# Patient Record
Sex: FEMALE | Race: WHITE | Employment: OTHER | URBAN - METROPOLITAN AREA
[De-identification: names, ages, dates, MRNs, and addresses within clinical notes are randomized per-mention and may not be internally consistent; named-entity substitution may affect disease eponyms.]

---

## 2024-09-22 ENCOUNTER — APPOINTMENT (OUTPATIENT)
Dept: CT IMAGING | Age: 64
End: 2024-09-22
Payer: COMMERCIAL

## 2024-09-22 ENCOUNTER — APPOINTMENT (OUTPATIENT)
Dept: ULTRASOUND IMAGING | Age: 64
End: 2024-09-22
Payer: COMMERCIAL

## 2024-09-22 ENCOUNTER — APPOINTMENT (OUTPATIENT)
Dept: GENERAL RADIOLOGY | Age: 64
End: 2024-09-22
Payer: COMMERCIAL

## 2024-09-22 ENCOUNTER — HOSPITAL ENCOUNTER (INPATIENT)
Age: 64
LOS: 5 days | Discharge: HOME | End: 2024-09-27
Attending: EMERGENCY MEDICINE | Admitting: FAMILY MEDICINE
Payer: COMMERCIAL

## 2024-09-22 DIAGNOSIS — I26.99 ACUTE PULMONARY EMBOLISM WITHOUT ACUTE COR PULMONALE, UNSPECIFIED PULMONARY EMBOLISM TYPE (HCC): ICD-10-CM

## 2024-09-22 DIAGNOSIS — I50.9 CHRONIC CONGESTIVE HEART FAILURE, UNSPECIFIED HEART FAILURE TYPE (HCC): ICD-10-CM

## 2024-09-22 DIAGNOSIS — R07.9 CHEST PAIN, UNSPECIFIED TYPE: ICD-10-CM

## 2024-09-22 DIAGNOSIS — I26.99 PULMONARY EMBOLISM WITHOUT ACUTE COR PULMONALE, UNSPECIFIED CHRONICITY, UNSPECIFIED PULMONARY EMBOLISM TYPE (HCC): Primary | ICD-10-CM

## 2024-09-22 DIAGNOSIS — I82.491 ACUTE DEEP VEIN THROMBOSIS (DVT) OF OTHER SPECIFIED VEIN OF RIGHT LOWER EXTREMITY (HCC): ICD-10-CM

## 2024-09-22 DIAGNOSIS — R06.02 SHORTNESS OF BREATH: ICD-10-CM

## 2024-09-22 PROBLEM — I10 ESSENTIAL (PRIMARY) HYPERTENSION: Status: ACTIVE | Noted: 2023-03-20

## 2024-09-22 PROBLEM — E78.49 OTHER HYPERLIPIDEMIA: Status: ACTIVE | Noted: 2024-09-22

## 2024-09-22 PROBLEM — J44.9 CHRONIC OBSTRUCTIVE PULMONARY DISEASE (HCC): Status: ACTIVE | Noted: 2021-09-07

## 2024-09-22 PROBLEM — I25.2 HISTORY OF MI (MYOCARDIAL INFARCTION): Status: ACTIVE | Noted: 2024-09-22

## 2024-09-22 PROBLEM — G89.29 CHRONIC PAIN: Status: ACTIVE | Noted: 2024-09-22

## 2024-09-22 PROBLEM — Z86.73 HISTORY OF CVA (CEREBROVASCULAR ACCIDENT): Status: ACTIVE | Noted: 2024-09-22

## 2024-09-22 PROBLEM — R57.9 SHOCK CIRCULATORY: Status: ACTIVE | Noted: 2024-09-22

## 2024-09-22 PROBLEM — I25.10 ATHEROSCLEROSIS OF NATIVE CORONARY ARTERY OF NATIVE HEART WITHOUT ANGINA PECTORIS: Status: ACTIVE | Noted: 2023-03-20

## 2024-09-22 PROBLEM — K31.84 GASTROPARESIS: Status: ACTIVE | Noted: 2024-09-22

## 2024-09-22 PROBLEM — M19.90 DJD (DEGENERATIVE JOINT DISEASE): Status: ACTIVE | Noted: 2024-09-22

## 2024-09-22 PROBLEM — Z87.39 HX OF DEGENERATIVE DISC DISEASE: Status: ACTIVE | Noted: 2024-09-22

## 2024-09-22 PROBLEM — F41.9 ANXIETY: Status: ACTIVE | Noted: 2024-09-22

## 2024-09-22 LAB
ALBUMIN SERPL-MCNC: 3.5 G/DL (ref 3.5–5.2)
ALP SERPL-CCNC: 57 U/L (ref 35–104)
ALT SERPL-CCNC: 6 U/L (ref 0–32)
ANION GAP SERPL CALCULATED.3IONS-SCNC: 9 MMOL/L (ref 7–16)
AST SERPL-CCNC: 11 U/L (ref 0–31)
BASOPHILS # BLD: 0.02 K/UL (ref 0–0.2)
BASOPHILS NFR BLD: 0 % (ref 0–2)
BILIRUB SERPL-MCNC: 0.5 MG/DL (ref 0–1.2)
BNP SERPL-MCNC: 154 PG/ML (ref 0–450)
BUN SERPL-MCNC: 9 MG/DL (ref 6–23)
CALCIUM SERPL-MCNC: 9 MG/DL (ref 8.6–10.2)
CHLORIDE SERPL-SCNC: 98 MMOL/L (ref 98–107)
CO2 SERPL-SCNC: 27 MMOL/L (ref 22–29)
CREAT SERPL-MCNC: 0.8 MG/DL (ref 0.5–1)
EOSINOPHIL # BLD: 0.17 K/UL (ref 0.05–0.5)
EOSINOPHILS RELATIVE PERCENT: 2 % (ref 0–6)
ERYTHROCYTE [DISTWIDTH] IN BLOOD BY AUTOMATED COUNT: 13.8 % (ref 11.5–15)
ERYTHROCYTE [DISTWIDTH] IN BLOOD BY AUTOMATED COUNT: 13.8 % (ref 11.5–15)
GFR, ESTIMATED: 78 ML/MIN/1.73M2
GLUCOSE SERPL-MCNC: 127 MG/DL (ref 74–99)
HCT VFR BLD AUTO: 31 % (ref 34–48)
HCT VFR BLD AUTO: 35.7 % (ref 34–48)
HGB BLD-MCNC: 10.4 G/DL (ref 11.5–15.5)
HGB BLD-MCNC: 12.1 G/DL (ref 11.5–15.5)
IMM GRANULOCYTES # BLD AUTO: 0.03 K/UL (ref 0–0.58)
IMM GRANULOCYTES NFR BLD: 0 % (ref 0–5)
LACTATE BLDV-SCNC: 2 MMOL/L (ref 0.5–2.2)
LIPASE SERPL-CCNC: 7 U/L (ref 13–60)
LYMPHOCYTES NFR BLD: 0.67 K/UL (ref 1.5–4)
LYMPHOCYTES RELATIVE PERCENT: 7 % (ref 20–42)
MAGNESIUM SERPL-MCNC: 1.9 MG/DL (ref 1.6–2.6)
MCH RBC QN AUTO: 28.4 PG (ref 26–35)
MCH RBC QN AUTO: 28.7 PG (ref 26–35)
MCHC RBC AUTO-ENTMCNC: 33.5 G/DL (ref 32–34.5)
MCHC RBC AUTO-ENTMCNC: 33.9 G/DL (ref 32–34.5)
MCV RBC AUTO: 84.7 FL (ref 80–99.9)
MCV RBC AUTO: 84.8 FL (ref 80–99.9)
MONOCYTES NFR BLD: 0.82 K/UL (ref 0.1–0.95)
MONOCYTES NFR BLD: 8 % (ref 2–12)
NEUTROPHILS NFR BLD: 83 % (ref 43–80)
NEUTS SEG NFR BLD: 8.48 K/UL (ref 1.8–7.3)
PARTIAL THROMBOPLASTIN TIME: 64.7 SEC (ref 24.5–35.1)
PLATELET # BLD AUTO: 129 K/UL (ref 130–450)
PLATELET # BLD AUTO: 140 K/UL (ref 130–450)
PMV BLD AUTO: 9.7 FL (ref 7–12)
PMV BLD AUTO: 9.7 FL (ref 7–12)
POTASSIUM SERPL-SCNC: 3.9 MMOL/L (ref 3.5–5)
PROT SERPL-MCNC: 6.7 G/DL (ref 6.4–8.3)
RBC # BLD AUTO: 3.66 M/UL (ref 3.5–5.5)
RBC # BLD AUTO: 4.21 M/UL (ref 3.5–5.5)
TROPONIN I SERPL HS-MCNC: 9 NG/L (ref 0–9)
WBC OTHER # BLD: 10.2 K/UL (ref 4.5–11.5)
WBC OTHER # BLD: 7.8 K/UL (ref 4.5–11.5)

## 2024-09-22 PROCEDURE — 93971 EXTREMITY STUDY: CPT

## 2024-09-22 PROCEDURE — 96366 THER/PROPH/DIAG IV INF ADDON: CPT

## 2024-09-22 PROCEDURE — 93005 ELECTROCARDIOGRAM TRACING: CPT | Performed by: EMERGENCY MEDICINE

## 2024-09-22 PROCEDURE — 83605 ASSAY OF LACTIC ACID: CPT

## 2024-09-22 PROCEDURE — 99285 EMERGENCY DEPT VISIT HI MDM: CPT

## 2024-09-22 PROCEDURE — 85027 COMPLETE CBC AUTOMATED: CPT

## 2024-09-22 PROCEDURE — 71275 CT ANGIOGRAPHY CHEST: CPT

## 2024-09-22 PROCEDURE — 84484 ASSAY OF TROPONIN QUANT: CPT

## 2024-09-22 PROCEDURE — 83880 ASSAY OF NATRIURETIC PEPTIDE: CPT

## 2024-09-22 PROCEDURE — 99223 1ST HOSP IP/OBS HIGH 75: CPT | Performed by: FAMILY MEDICINE

## 2024-09-22 PROCEDURE — 83735 ASSAY OF MAGNESIUM: CPT

## 2024-09-22 PROCEDURE — 85025 COMPLETE CBC W/AUTO DIFF WBC: CPT

## 2024-09-22 PROCEDURE — 6360000004 HC RX CONTRAST MEDICATION: Performed by: RADIOLOGY

## 2024-09-22 PROCEDURE — 96374 THER/PROPH/DIAG INJ IV PUSH: CPT

## 2024-09-22 PROCEDURE — 96361 HYDRATE IV INFUSION ADD-ON: CPT

## 2024-09-22 PROCEDURE — 83690 ASSAY OF LIPASE: CPT

## 2024-09-22 PROCEDURE — 80053 COMPREHEN METABOLIC PANEL: CPT

## 2024-09-22 PROCEDURE — 6360000002 HC RX W HCPCS: Performed by: EMERGENCY MEDICINE

## 2024-09-22 PROCEDURE — 85730 THROMBOPLASTIN TIME PARTIAL: CPT

## 2024-09-22 PROCEDURE — 96375 TX/PRO/DX INJ NEW DRUG ADDON: CPT

## 2024-09-22 PROCEDURE — 2580000003 HC RX 258: Performed by: EMERGENCY MEDICINE

## 2024-09-22 PROCEDURE — 82550 ASSAY OF CK (CPK): CPT

## 2024-09-22 PROCEDURE — 2580000003 HC RX 258

## 2024-09-22 PROCEDURE — 6370000000 HC RX 637 (ALT 250 FOR IP): Performed by: EMERGENCY MEDICINE

## 2024-09-22 PROCEDURE — 2000000000 HC ICU R&B

## 2024-09-22 PROCEDURE — 96365 THER/PROPH/DIAG IV INF INIT: CPT

## 2024-09-22 PROCEDURE — 71045 X-RAY EXAM CHEST 1 VIEW: CPT

## 2024-09-22 RX ORDER — ATORVASTATIN CALCIUM 40 MG/1
40 TABLET, FILM COATED ORAL NIGHTLY
Status: DISCONTINUED | OUTPATIENT
Start: 2024-09-22 | End: 2024-09-27 | Stop reason: HOSPADM

## 2024-09-22 RX ORDER — ERGOCALCIFEROL 1.25 MG/1
1 CAPSULE ORAL WEEKLY
COMMUNITY
Start: 2024-09-18

## 2024-09-22 RX ORDER — FENTANYL CITRATE 50 UG/ML
50 INJECTION, SOLUTION INTRAMUSCULAR; INTRAVENOUS ONCE
Status: COMPLETED | OUTPATIENT
Start: 2024-09-22 | End: 2024-09-23

## 2024-09-22 RX ORDER — FOLIC ACID 1 MG/1
1 TABLET ORAL DAILY
COMMUNITY

## 2024-09-22 RX ORDER — BUDESONIDE 0.5 MG/2ML
0.5 INHALANT ORAL
Status: DISCONTINUED | OUTPATIENT
Start: 2024-09-22 | End: 2024-09-23 | Stop reason: SDUPTHER

## 2024-09-22 RX ORDER — HYDROCODONE BITARTRATE AND ACETAMINOPHEN 5; 325 MG/1; MG/1
1 TABLET ORAL ONCE
Status: COMPLETED | OUTPATIENT
Start: 2024-09-22 | End: 2024-09-22

## 2024-09-22 RX ORDER — NICOTINE 21 MG/24HR
1 PATCH, TRANSDERMAL 24 HOURS TRANSDERMAL DAILY
Status: DISCONTINUED | OUTPATIENT
Start: 2024-09-22 | End: 2024-09-27 | Stop reason: HOSPADM

## 2024-09-22 RX ORDER — GLUCAGON 1 MG/ML
1 KIT INJECTION PRN
Status: DISCONTINUED | OUTPATIENT
Start: 2024-09-22 | End: 2024-09-27 | Stop reason: HOSPADM

## 2024-09-22 RX ORDER — SODIUM CHLORIDE 0.9 % (FLUSH) 0.9 %
5-40 SYRINGE (ML) INJECTION PRN
Status: DISCONTINUED | OUTPATIENT
Start: 2024-09-22 | End: 2024-09-27 | Stop reason: HOSPADM

## 2024-09-22 RX ORDER — ARFORMOTEROL TARTRATE 15 UG/2ML
15 SOLUTION RESPIRATORY (INHALATION)
Status: DISCONTINUED | OUTPATIENT
Start: 2024-09-22 | End: 2024-09-27 | Stop reason: HOSPADM

## 2024-09-22 RX ORDER — FOLIC ACID 1 MG/1
1 TABLET ORAL DAILY
Status: DISCONTINUED | OUTPATIENT
Start: 2024-09-23 | End: 2024-09-27 | Stop reason: HOSPADM

## 2024-09-22 RX ORDER — ERGOCALCIFEROL 1.25 MG/1
50000 CAPSULE, LIQUID FILLED ORAL
Status: DISCONTINUED | OUTPATIENT
Start: 2024-09-23 | End: 2024-09-27 | Stop reason: HOSPADM

## 2024-09-22 RX ORDER — 0.9 % SODIUM CHLORIDE 0.9 %
500 INTRAVENOUS SOLUTION INTRAVENOUS ONCE
Status: COMPLETED | OUTPATIENT
Start: 2024-09-22 | End: 2024-09-22

## 2024-09-22 RX ORDER — ACETAMINOPHEN 325 MG/1
650 TABLET ORAL EVERY 6 HOURS PRN
Status: DISCONTINUED | OUTPATIENT
Start: 2024-09-22 | End: 2024-09-27 | Stop reason: HOSPADM

## 2024-09-22 RX ORDER — SODIUM CHLORIDE 9 MG/ML
INJECTION, SOLUTION INTRAVENOUS CONTINUOUS
Status: DISCONTINUED | OUTPATIENT
Start: 2024-09-22 | End: 2024-09-23

## 2024-09-22 RX ORDER — TRAZODONE HYDROCHLORIDE 50 MG/1
50 TABLET, FILM COATED ORAL NIGHTLY
COMMUNITY
Start: 2022-10-10

## 2024-09-22 RX ORDER — METOPROLOL SUCCINATE 25 MG/1
25 TABLET, EXTENDED RELEASE ORAL DAILY
Status: ON HOLD | COMMUNITY
Start: 2024-08-19 | End: 2024-09-27 | Stop reason: HOSPADM

## 2024-09-22 RX ORDER — HEPARIN SODIUM 1000 [USP'U]/ML
80 INJECTION, SOLUTION INTRAVENOUS; SUBCUTANEOUS ONCE
Status: COMPLETED | OUTPATIENT
Start: 2024-09-22 | End: 2024-09-22

## 2024-09-22 RX ORDER — ATORVASTATIN CALCIUM 40 MG/1
1 TABLET, FILM COATED ORAL NIGHTLY
COMMUNITY
Start: 2024-03-18

## 2024-09-22 RX ORDER — MORPHINE SULFATE 15 MG/1
15 TABLET, FILM COATED, EXTENDED RELEASE ORAL EVERY 12 HOURS
Status: DISCONTINUED | OUTPATIENT
Start: 2024-09-22 | End: 2024-09-27 | Stop reason: HOSPADM

## 2024-09-22 RX ORDER — 0.9 % SODIUM CHLORIDE 0.9 %
1000 INTRAVENOUS SOLUTION INTRAVENOUS ONCE
Status: COMPLETED | OUTPATIENT
Start: 2024-09-22 | End: 2024-09-22

## 2024-09-22 RX ORDER — IPRATROPIUM BROMIDE AND ALBUTEROL SULFATE 2.5; .5 MG/3ML; MG/3ML
1 SOLUTION RESPIRATORY (INHALATION) EVERY 4 HOURS PRN
Status: DISCONTINUED | OUTPATIENT
Start: 2024-09-22 | End: 2024-09-27 | Stop reason: HOSPADM

## 2024-09-22 RX ORDER — HEPARIN SODIUM 1000 [USP'U]/ML
40 INJECTION, SOLUTION INTRAVENOUS; SUBCUTANEOUS PRN
Status: DISCONTINUED | OUTPATIENT
Start: 2024-09-22 | End: 2024-09-24

## 2024-09-22 RX ORDER — CLOPIDOGREL BISULFATE 75 MG/1
75 TABLET ORAL DAILY
COMMUNITY
Start: 2024-05-21

## 2024-09-22 RX ORDER — LISINOPRIL 40 MG/1
40 TABLET ORAL DAILY
Status: ON HOLD | COMMUNITY
Start: 2024-08-19 | End: 2024-09-27 | Stop reason: HOSPADM

## 2024-09-22 RX ORDER — HEPARIN SODIUM 1000 [USP'U]/ML
80 INJECTION, SOLUTION INTRAVENOUS; SUBCUTANEOUS PRN
Status: DISCONTINUED | OUTPATIENT
Start: 2024-09-22 | End: 2024-09-24

## 2024-09-22 RX ORDER — SODIUM CHLORIDE 9 MG/ML
INJECTION, SOLUTION INTRAVENOUS PRN
Status: DISCONTINUED | OUTPATIENT
Start: 2024-09-22 | End: 2024-09-27 | Stop reason: HOSPADM

## 2024-09-22 RX ORDER — IOPAMIDOL 755 MG/ML
70 INJECTION, SOLUTION INTRAVASCULAR
Status: COMPLETED | OUTPATIENT
Start: 2024-09-22 | End: 2024-09-22

## 2024-09-22 RX ORDER — FENTANYL CITRATE 50 UG/ML
50 INJECTION, SOLUTION INTRAMUSCULAR; INTRAVENOUS ONCE
Status: COMPLETED | OUTPATIENT
Start: 2024-09-22 | End: 2024-09-22

## 2024-09-22 RX ORDER — SENNA AND DOCUSATE SODIUM 50; 8.6 MG/1; MG/1
2 TABLET, FILM COATED ORAL DAILY PRN
Status: DISCONTINUED | OUTPATIENT
Start: 2024-09-22 | End: 2024-09-27 | Stop reason: HOSPADM

## 2024-09-22 RX ORDER — QUETIAPINE FUMARATE 300 MG/1
300 TABLET, FILM COATED ORAL DAILY
COMMUNITY
Start: 2024-07-11

## 2024-09-22 RX ORDER — ONDANSETRON 2 MG/ML
4 INJECTION INTRAMUSCULAR; INTRAVENOUS ONCE
Status: COMPLETED | OUTPATIENT
Start: 2024-09-22 | End: 2024-09-22

## 2024-09-22 RX ORDER — ISOSORBIDE MONONITRATE 60 MG/1
1 TABLET, EXTENDED RELEASE ORAL EVERY MORNING
Status: ON HOLD | COMMUNITY
Start: 2024-08-19 | End: 2024-09-27 | Stop reason: HOSPADM

## 2024-09-22 RX ORDER — TOPIRAMATE 50 MG/1
50 TABLET, FILM COATED ORAL 2 TIMES DAILY
COMMUNITY

## 2024-09-22 RX ORDER — ACETAMINOPHEN 650 MG/1
650 SUPPOSITORY RECTAL EVERY 6 HOURS PRN
Status: DISCONTINUED | OUTPATIENT
Start: 2024-09-22 | End: 2024-09-27 | Stop reason: HOSPADM

## 2024-09-22 RX ORDER — AMLODIPINE BESYLATE 5 MG/1
5 TABLET ORAL DAILY
Status: ON HOLD | COMMUNITY
Start: 2024-08-19 | End: 2024-09-27 | Stop reason: HOSPADM

## 2024-09-22 RX ORDER — SODIUM CHLORIDE 9 MG/ML
INJECTION, SOLUTION INTRAVENOUS
Status: COMPLETED
Start: 2024-09-22 | End: 2024-09-22

## 2024-09-22 RX ORDER — ASPIRIN 81 MG/1
81 TABLET ORAL DAILY
COMMUNITY
Start: 2024-05-21

## 2024-09-22 RX ORDER — FENTANYL CITRATE 50 UG/ML
50 INJECTION, SOLUTION INTRAMUSCULAR; INTRAVENOUS
Status: DISCONTINUED | OUTPATIENT
Start: 2024-09-23 | End: 2024-09-23 | Stop reason: CLARIF

## 2024-09-22 RX ORDER — DEXTROSE MONOHYDRATE 100 MG/ML
INJECTION, SOLUTION INTRAVENOUS CONTINUOUS PRN
Status: DISCONTINUED | OUTPATIENT
Start: 2024-09-22 | End: 2024-09-27 | Stop reason: HOSPADM

## 2024-09-22 RX ORDER — ALPRAZOLAM 0.5 MG
0.5 TABLET ORAL 2 TIMES DAILY PRN
COMMUNITY
Start: 2024-09-18

## 2024-09-22 RX ORDER — SODIUM CHLORIDE 0.9 % (FLUSH) 0.9 %
5-40 SYRINGE (ML) INJECTION EVERY 12 HOURS SCHEDULED
Status: DISCONTINUED | OUTPATIENT
Start: 2024-09-22 | End: 2024-09-27 | Stop reason: HOSPADM

## 2024-09-22 RX ORDER — VENLAFAXINE HYDROCHLORIDE 150 MG/1
150 CAPSULE, EXTENDED RELEASE ORAL DAILY
COMMUNITY

## 2024-09-22 RX ORDER — ONDANSETRON 4 MG/1
4 TABLET, ORALLY DISINTEGRATING ORAL EVERY 8 HOURS PRN
Status: ON HOLD | COMMUNITY
Start: 2024-07-19 | End: 2024-09-27 | Stop reason: HOSPADM

## 2024-09-22 RX ORDER — VENLAFAXINE HYDROCHLORIDE 150 MG/1
150 CAPSULE, EXTENDED RELEASE ORAL DAILY
Status: DISCONTINUED | OUTPATIENT
Start: 2024-09-23 | End: 2024-09-27 | Stop reason: HOSPADM

## 2024-09-22 RX ORDER — MORPHINE SULFATE 15 MG/1
15 TABLET, FILM COATED, EXTENDED RELEASE ORAL EVERY 12 HOURS
COMMUNITY
Start: 2024-09-16

## 2024-09-22 RX ORDER — QUETIAPINE FUMARATE 100 MG/1
300 TABLET, FILM COATED ORAL DAILY
Status: DISCONTINUED | OUTPATIENT
Start: 2024-09-23 | End: 2024-09-24 | Stop reason: SDUPTHER

## 2024-09-22 RX ORDER — TRAZODONE HYDROCHLORIDE 50 MG/1
50 TABLET, FILM COATED ORAL NIGHTLY
Status: DISCONTINUED | OUTPATIENT
Start: 2024-09-22 | End: 2024-09-27 | Stop reason: HOSPADM

## 2024-09-22 RX ORDER — HEPARIN SODIUM 10000 [USP'U]/100ML
5-30 INJECTION, SOLUTION INTRAVENOUS CONTINUOUS
Status: DISCONTINUED | OUTPATIENT
Start: 2024-09-22 | End: 2024-09-24

## 2024-09-22 RX ORDER — ENOXAPARIN SODIUM 100 MG/ML
1 INJECTION SUBCUTANEOUS ONCE
Status: DISCONTINUED | OUTPATIENT
Start: 2024-09-22 | End: 2024-09-22

## 2024-09-22 RX ORDER — NICOTINE 21 MG/24HR
1 PATCH, TRANSDERMAL 24 HOURS TRANSDERMAL DAILY
COMMUNITY
Start: 2024-05-16

## 2024-09-22 RX ORDER — TOPIRAMATE 25 MG/1
50 TABLET, FILM COATED ORAL 2 TIMES DAILY
Status: DISCONTINUED | OUTPATIENT
Start: 2024-09-22 | End: 2024-09-27 | Stop reason: HOSPADM

## 2024-09-22 RX ORDER — ALPRAZOLAM 0.5 MG
0.5 TABLET ORAL 2 TIMES DAILY PRN
Status: DISCONTINUED | OUTPATIENT
Start: 2024-09-22 | End: 2024-09-27 | Stop reason: HOSPADM

## 2024-09-22 RX ADMIN — HEPARIN SODIUM 5800 UNITS: 1000 INJECTION INTRAVENOUS; SUBCUTANEOUS at 19:26

## 2024-09-22 RX ADMIN — SODIUM CHLORIDE 1000 ML: 9 INJECTION, SOLUTION INTRAVENOUS at 19:28

## 2024-09-22 RX ADMIN — ONDANSETRON 4 MG: 2 INJECTION INTRAMUSCULAR; INTRAVENOUS at 14:19

## 2024-09-22 RX ADMIN — Medication 500 ML: at 18:22

## 2024-09-22 RX ADMIN — SODIUM CHLORIDE 1000 ML: 9 INJECTION, SOLUTION INTRAVENOUS at 14:19

## 2024-09-22 RX ADMIN — IOPAMIDOL 70 ML: 755 INJECTION, SOLUTION INTRAVENOUS at 18:12

## 2024-09-22 RX ADMIN — FENTANYL CITRATE 50 MCG: 50 INJECTION, SOLUTION INTRAMUSCULAR; INTRAVENOUS at 19:28

## 2024-09-22 RX ADMIN — HYDROCODONE BITARTRATE AND ACETAMINOPHEN 1 TABLET: 5; 325 TABLET ORAL at 14:19

## 2024-09-22 RX ADMIN — SODIUM CHLORIDE 500 ML: 9 INJECTION, SOLUTION INTRAVENOUS at 18:22

## 2024-09-22 RX ADMIN — HEPARIN SODIUM AND DEXTROSE 18 UNITS/KG/HR: 10000; 5 INJECTION INTRAVENOUS at 19:28

## 2024-09-22 ASSESSMENT — PAIN SCALES - GENERAL: PAINLEVEL_OUTOF10: 10

## 2024-09-22 NOTE — ED PROVIDER NOTES
[facility] EMERGENCY DEPARTMENT  EMERGENCY DEPARTMENT ENCOUNTER        Pt Name: Niru Chaves  MRN: [id number]  Birthdate 1960  Date of evaluation: 9/22/2024  Provider: Aracelis Zacarias DO  PCP: Crystal Paulino MD  Note Started: 2:00 PM EDT 9/22/24    CHIEF COMPLAINT       Chief Complaint   Patient presents with    Leg Swelling     Right leg pain and swelling        HISTORY OF PRESENT ILLNESS: 1 or more Elements   History From: Patient    Limitations to history : None    Niru Chaves is a 64 y.o. female who presents with concern for pain and swelling in her right lower extremity.  She does have some worsening over the past 2 days, nothing is made better, nothing is made it worse, no traumatic events.  She denies recent travel or surgery, she is on Plavix and no anticoagulation, she denies chest pain or difficulty breathing, no headaches or vision changes, no numbness, weakness, tingling.  No other associated complaints.  She notes that she does have 5 stents for her cardiac history, has never had a blood clot in the past, no other associations.      EXTERNAL NOTE REVIEW:      On chart review was last seen by PCP on 3/20/2023 for follow-up on chronic medical conditions.    REVIEW OF SYSTEMS :      Positives and Pertinent negatives as per HPI.     SURGICAL HISTORY   No past surgical history on file.    CURRENTMEDICATIONS       Previous Medications    ALPRAZOLAM (XANAX) 0.5 MG TABLET    Take 1 tablet by mouth 2 times daily as needed for Anxiety. Max Daily Amount: 1 mg    AMLODIPINE (NORVASC) 5 MG TABLET    Take 1 tablet by mouth daily    ASPIRIN 81 MG EC TABLET    Take 1 tablet by mouth daily    ATORVASTATIN (LIPITOR) 40 MG TABLET    Take 1 tablet by mouth nightly    CLOPIDOGREL (PLAVIX) 75 MG TABLET    Take 1 tablet by mouth daily    FOLIC ACID (FOLVITE) 1 MG TABLET    Take 1 tablet by mouth daily    ISOSORBIDE MONONITRATE (IMDUR) 60 MG EXTENDED RELEASE TABLET    Take 1 tablet  addition to providing specific details for the plan of care and counseling regarding the diagnosis and prognosis.  Questions are answered at this time and they are agreeable with the plan.      I am the Primary Clinician of Record.    FINAL IMPRESSION      1. Pulmonary embolism without acute cor pulmonale, unspecified chronicity, unspecified pulmonary embolism type (HCC)    2. Acute deep vein thrombosis (DVT) of other specified vein of right lower extremity (HCC)    3. Chest pain, unspecified type    4. Chronic congestive heart failure, unspecified heart failure type (HCC)    5. Shortness of breath          DISPOSITION/PLAN     DISPOSITION Admitted 09/22/2024 09:07:08 PM      (Please note that portions of this note were completed with a voice recognition program.  Efforts were made to edit the dictations but occasionally words are mis-transcribed.)    Aracelis Zacarias DO (electronically signed)            Aracelis Zacarias DO  09/23/24 0033

## 2024-09-23 ENCOUNTER — APPOINTMENT (OUTPATIENT)
Age: 64
End: 2024-09-23
Attending: FAMILY MEDICINE
Payer: COMMERCIAL

## 2024-09-23 PROBLEM — R07.9 CHEST PAIN: Status: ACTIVE | Noted: 2024-09-23

## 2024-09-23 PROBLEM — E78.5 HYPERLIPIDEMIA: Status: ACTIVE | Noted: 2024-09-22

## 2024-09-23 PROBLEM — I26.99 PULMONARY EMBOLISM WITHOUT ACUTE COR PULMONALE (HCC): Status: ACTIVE | Noted: 2024-09-23

## 2024-09-23 PROBLEM — R06.02 SHORTNESS OF BREATH: Status: ACTIVE | Noted: 2024-09-23

## 2024-09-23 PROBLEM — I82.409 DEEP VENOUS THROMBOSIS (HCC): Status: ACTIVE | Noted: 2024-09-23

## 2024-09-23 LAB
ANION GAP SERPL CALCULATED.3IONS-SCNC: 9 MMOL/L (ref 7–16)
BACTERIA URNS QL MICRO: ABNORMAL
BASOPHILS # BLD: 0.01 K/UL (ref 0–0.2)
BASOPHILS NFR BLD: 0 % (ref 0–2)
BILIRUB UR QL STRIP: NEGATIVE
BNP SERPL-MCNC: 267 PG/ML (ref 0–450)
BUN SERPL-MCNC: 7 MG/DL (ref 6–23)
CALCIUM SERPL-MCNC: 8 MG/DL (ref 8.6–10.2)
CHLORIDE SERPL-SCNC: 107 MMOL/L (ref 98–107)
CHOLEST SERPL-MCNC: 131 MG/DL
CLARITY UR: CLEAR
CO2 SERPL-SCNC: 23 MMOL/L (ref 22–29)
COLOR UR: YELLOW
CREAT SERPL-MCNC: 0.7 MG/DL (ref 0.5–1)
ECHO AV AREA PEAK VELOCITY: 3.1 CM2
ECHO AV AREA PEAK VELOCITY: 3.2 CM2
ECHO AV AREA PEAK VELOCITY: 3.6 CM2
ECHO AV AREA PEAK VELOCITY: 3.7 CM2
ECHO AV AREA VTI: 3.6 CM2
ECHO AV AREA/BSA VTI: 1.9 CM2/M2
ECHO AV CUSP MM: 2.2 CM
ECHO AV MEAN GRADIENT: 4 MMHG
ECHO AV MEAN VELOCITY: 1 M/S
ECHO AV PEAK GRADIENT: 7 MMHG
ECHO AV PEAK GRADIENT: 7 MMHG
ECHO AV PEAK VELOCITY: 1.2 M/S
ECHO AV PEAK VELOCITY: 1.4 M/S
ECHO AV VTI: 26.8 CM
ECHO BSA: 1.92 M2
ECHO EST RA PRESSURE: 3 MMHG
ECHO LA DIAMETER INDEX: 2.16 CM/M2
ECHO LA DIAMETER: 4.1 CM
ECHO LA VOL A-L A2C: 54 ML (ref 22–52)
ECHO LA VOL A-L A4C: 53 ML (ref 22–52)
ECHO LA VOL BP: 55 ML (ref 22–52)
ECHO LA VOL MOD A2C: 53 ML (ref 22–52)
ECHO LA VOL MOD A4C: 50 ML (ref 22–52)
ECHO LA VOL/BSA BIPLANE: 29 ML/M2 (ref 16–34)
ECHO LA VOLUME AREA LENGTH: 57 ML
ECHO LA VOLUME INDEX A-L A2C: 28 ML/M2 (ref 16–34)
ECHO LA VOLUME INDEX A-L A4C: 28 ML/M2 (ref 16–34)
ECHO LA VOLUME INDEX AREA LENGTH: 30 ML/M2 (ref 16–34)
ECHO LA VOLUME INDEX MOD A2C: 28 ML/M2 (ref 16–34)
ECHO LA VOLUME INDEX MOD A4C: 26 ML/M2 (ref 16–34)
ECHO LV EF PHYSICIAN: 68 %
ECHO LV FRACTIONAL SHORTENING: 38 % (ref 28–44)
ECHO LV INTERNAL DIMENSION DIASTOLE INDEX: 2.47 CM/M2
ECHO LV INTERNAL DIMENSION DIASTOLIC: 4.7 CM (ref 3.9–5.3)
ECHO LV INTERNAL DIMENSION SYSTOLIC INDEX: 1.53 CM/M2
ECHO LV INTERNAL DIMENSION SYSTOLIC: 2.9 CM
ECHO LV IVSD: 1 CM (ref 0.6–0.9)
ECHO LV IVSS: 1.6 CM
ECHO LV MASS 2D: 175.8 G (ref 67–162)
ECHO LV MASS INDEX 2D: 92.5 G/M2 (ref 43–95)
ECHO LV POSTERIOR WALL DIASTOLIC: 1.1 CM (ref 0.6–0.9)
ECHO LV POSTERIOR WALL SYSTOLIC: 1.6 CM
ECHO LV RELATIVE WALL THICKNESS RATIO: 0.47
ECHO LVOT AREA: 4.2 CM2
ECHO LVOT AV VTI INDEX: 0.84
ECHO LVOT DIAM: 2.3 CM
ECHO LVOT MEAN GRADIENT: 2 MMHG
ECHO LVOT PEAK GRADIENT: 4 MMHG
ECHO LVOT PEAK GRADIENT: 4 MMHG
ECHO LVOT PEAK VELOCITY: 1 M/S
ECHO LVOT PEAK VELOCITY: 1 M/S
ECHO LVOT STROKE VOLUME INDEX: 49.4 ML/M2
ECHO LVOT SV: 93.8 ML
ECHO LVOT VTI: 22.6 CM
ECHO MV "A" WAVE DURATION: 137 MSEC
ECHO MV A VELOCITY: 0.88 M/S
ECHO MV AREA PHT: 3.1 CM2
ECHO MV AREA VTI: 3.6 CM2
ECHO MV E DECELERATION TIME (DT): 287.5 MS
ECHO MV E VELOCITY: 0.68 M/S
ECHO MV E/A RATIO: 0.77
ECHO MV LVOT VTI INDEX: 1.15
ECHO MV MAX VELOCITY: 1.1 M/S
ECHO MV MEAN GRADIENT: 2 MMHG
ECHO MV MEAN VELOCITY: 0.6 M/S
ECHO MV PEAK GRADIENT: 4 MMHG
ECHO MV PRESSURE HALF TIME (PHT): 70.4 MS
ECHO MV VTI: 26.1 CM
ECHO PV MAX VELOCITY: 1 M/S
ECHO PV MEAN GRADIENT: 2 MMHG
ECHO PV MEAN VELOCITY: 0.8 M/S
ECHO PV PEAK GRADIENT: 4 MMHG
ECHO PV VTI: 20 CM
ECHO PVEIN A DURATION: 144.6 MS
ECHO PVEIN A VELOCITY: 0.4 M/S
ECHO PVEIN PEAK D VELOCITY: 0.4 M/S
ECHO PVEIN PEAK S VELOCITY: 0.6 M/S
ECHO PVEIN S/D RATIO: 1.5
ECHO RIGHT VENTRICULAR SYSTOLIC PRESSURE (RVSP): 11 MMHG
ECHO RV INTERNAL DIMENSION: 3.2 CM
ECHO RV LONGITUDINAL DIMENSION: 7.8 CM
ECHO RV MID DIMENSION: 3.7 CM
ECHO RVOT MEAN GRADIENT: 1 MMHG
ECHO RVOT PEAK GRADIENT: 2 MMHG
ECHO RVOT PEAK VELOCITY: 0.7 M/S
ECHO RVOT VTI: 12.2 CM
ECHO TV REGURGITANT MAX VELOCITY: 1.4 M/S
ECHO TV REGURGITANT PEAK GRADIENT: 8 MMHG
EKG ATRIAL RATE: 94 BPM
EKG P AXIS: 46 DEGREES
EKG P-R INTERVAL: 140 MS
EKG Q-T INTERVAL: 348 MS
EKG QRS DURATION: 84 MS
EKG QTC CALCULATION (BAZETT): 435 MS
EKG R AXIS: -7 DEGREES
EKG T AXIS: 50 DEGREES
EKG VENTRICULAR RATE: 94 BPM
EOSINOPHIL # BLD: 0.14 K/UL (ref 0.05–0.5)
EOSINOPHILS RELATIVE PERCENT: 2 % (ref 0–6)
EPI CELLS #/AREA URNS HPF: ABNORMAL /HPF
ERYTHROCYTE [DISTWIDTH] IN BLOOD BY AUTOMATED COUNT: 13.8 % (ref 11.5–15)
GFR, ESTIMATED: >90 ML/MIN/1.73M2
GLUCOSE SERPL-MCNC: 111 MG/DL (ref 74–99)
GLUCOSE UR STRIP-MCNC: NEGATIVE MG/DL
HCT VFR BLD AUTO: 33.4 % (ref 34–48)
HDLC SERPL-MCNC: 30 MG/DL
HGB BLD-MCNC: 11.1 G/DL (ref 11.5–15.5)
HGB UR QL STRIP.AUTO: NEGATIVE
IMM GRANULOCYTES # BLD AUTO: 0.04 K/UL (ref 0–0.58)
IMM GRANULOCYTES NFR BLD: 1 % (ref 0–5)
INR PPP: 1.4
KETONES UR STRIP-MCNC: NEGATIVE MG/DL
LACTATE BLDV-SCNC: 1.3 MMOL/L (ref 0.5–2.2)
LDLC SERPL CALC-MCNC: 79 MG/DL
LEUKOCYTE ESTERASE UR QL STRIP: ABNORMAL
LYMPHOCYTES NFR BLD: 0.83 K/UL (ref 1.5–4)
LYMPHOCYTES RELATIVE PERCENT: 10 % (ref 20–42)
MCH RBC QN AUTO: 28.6 PG (ref 26–35)
MCHC RBC AUTO-ENTMCNC: 33.2 G/DL (ref 32–34.5)
MCV RBC AUTO: 86.1 FL (ref 80–99.9)
MONOCYTES NFR BLD: 0.71 K/UL (ref 0.1–0.95)
MONOCYTES NFR BLD: 8 % (ref 2–12)
NEUTROPHILS NFR BLD: 80 % (ref 43–80)
NEUTS SEG NFR BLD: 6.73 K/UL (ref 1.8–7.3)
NITRITE UR QL STRIP: NEGATIVE
PARTIAL THROMBOPLASTIN TIME: 146.7 SEC (ref 24.5–35.1)
PARTIAL THROMBOPLASTIN TIME: 151.8 SEC (ref 24.5–35.1)
PARTIAL THROMBOPLASTIN TIME: 74.4 SEC (ref 24.5–35.1)
PARTIAL THROMBOPLASTIN TIME: 80.4 SEC (ref 24.5–35.1)
PH UR STRIP: 7.5 [PH] (ref 5–9)
PLATELET # BLD AUTO: 164 K/UL (ref 130–450)
PMV BLD AUTO: 9.5 FL (ref 7–12)
POTASSIUM SERPL-SCNC: 4 MMOL/L (ref 3.5–5)
PROT UR STRIP-MCNC: NEGATIVE MG/DL
PROTHROMBIN TIME: 15.1 SEC (ref 9.3–12.4)
RBC # BLD AUTO: 3.88 M/UL (ref 3.5–5.5)
RBC #/AREA URNS HPF: ABNORMAL /HPF
SODIUM SERPL-SCNC: 139 MMOL/L (ref 132–146)
SP GR UR STRIP: 1.01 (ref 1–1.03)
TRIGL SERPL-MCNC: 112 MG/DL
TROPONIN I SERPL HS-MCNC: 10 NG/L (ref 0–9)
TROPONIN I SERPL HS-MCNC: 9 NG/L (ref 0–9)
UROBILINOGEN UR STRIP-ACNC: 1 EU/DL (ref 0–1)
VLDLC SERPL CALC-MCNC: 22 MG/DL
WBC #/AREA URNS HPF: ABNORMAL /HPF
WBC OTHER # BLD: 8.5 K/UL (ref 4.5–11.5)

## 2024-09-23 PROCEDURE — 86147 CARDIOLIPIN ANTIBODY EA IG: CPT

## 2024-09-23 PROCEDURE — 6360000002 HC RX W HCPCS: Performed by: INTERNAL MEDICINE

## 2024-09-23 PROCEDURE — 6370000000 HC RX 637 (ALT 250 FOR IP): Performed by: INTERNAL MEDICINE

## 2024-09-23 PROCEDURE — 83880 ASSAY OF NATRIURETIC PEPTIDE: CPT

## 2024-09-23 PROCEDURE — 2580000003 HC RX 258: Performed by: INTERNAL MEDICINE

## 2024-09-23 PROCEDURE — 6370000000 HC RX 637 (ALT 250 FOR IP): Performed by: FAMILY MEDICINE

## 2024-09-23 PROCEDURE — 93010 ELECTROCARDIOGRAM REPORT: CPT | Performed by: INTERNAL MEDICINE

## 2024-09-23 PROCEDURE — 85610 PROTHROMBIN TIME: CPT

## 2024-09-23 PROCEDURE — 81001 URINALYSIS AUTO W/SCOPE: CPT

## 2024-09-23 PROCEDURE — 6360000002 HC RX W HCPCS: Performed by: FAMILY MEDICINE

## 2024-09-23 PROCEDURE — 6360000002 HC RX W HCPCS

## 2024-09-23 PROCEDURE — 99291 CRITICAL CARE FIRST HOUR: CPT | Performed by: INTERNAL MEDICINE

## 2024-09-23 PROCEDURE — 94640 AIRWAY INHALATION TREATMENT: CPT

## 2024-09-23 PROCEDURE — 2580000003 HC RX 258: Performed by: FAMILY MEDICINE

## 2024-09-23 PROCEDURE — 80061 LIPID PANEL: CPT

## 2024-09-23 PROCEDURE — 87040 BLOOD CULTURE FOR BACTERIA: CPT

## 2024-09-23 PROCEDURE — 80048 BASIC METABOLIC PNL TOTAL CA: CPT

## 2024-09-23 PROCEDURE — 87081 CULTURE SCREEN ONLY: CPT

## 2024-09-23 PROCEDURE — 83605 ASSAY OF LACTIC ACID: CPT

## 2024-09-23 PROCEDURE — 99232 SBSQ HOSP IP/OBS MODERATE 35: CPT | Performed by: FAMILY MEDICINE

## 2024-09-23 PROCEDURE — 85025 COMPLETE CBC W/AUTO DIFF WBC: CPT

## 2024-09-23 PROCEDURE — 84484 ASSAY OF TROPONIN QUANT: CPT

## 2024-09-23 PROCEDURE — 93306 TTE W/DOPPLER COMPLETE: CPT | Performed by: INTERNAL MEDICINE

## 2024-09-23 PROCEDURE — 85730 THROMBOPLASTIN TIME PARTIAL: CPT

## 2024-09-23 PROCEDURE — 93306 TTE W/DOPPLER COMPLETE: CPT

## 2024-09-23 PROCEDURE — 81241 F5 GENE: CPT

## 2024-09-23 PROCEDURE — 2000000000 HC ICU R&B

## 2024-09-23 RX ORDER — ONDANSETRON 2 MG/ML
4 INJECTION INTRAMUSCULAR; INTRAVENOUS EVERY 6 HOURS PRN
Status: DISCONTINUED | OUTPATIENT
Start: 2024-09-23 | End: 2024-09-27 | Stop reason: HOSPADM

## 2024-09-23 RX ORDER — IPRATROPIUM BROMIDE AND ALBUTEROL SULFATE 2.5; .5 MG/3ML; MG/3ML
1 SOLUTION RESPIRATORY (INHALATION)
Status: DISCONTINUED | OUTPATIENT
Start: 2024-09-23 | End: 2024-09-23

## 2024-09-23 RX ORDER — CLOPIDOGREL BISULFATE 75 MG/1
75 TABLET ORAL DAILY
Status: DISCONTINUED | OUTPATIENT
Start: 2024-09-23 | End: 2024-09-27 | Stop reason: HOSPADM

## 2024-09-23 RX ORDER — FENTANYL CITRATE 0.05 MG/ML
50 INJECTION, SOLUTION INTRAMUSCULAR; INTRAVENOUS
Status: DISCONTINUED | OUTPATIENT
Start: 2024-09-23 | End: 2024-09-27 | Stop reason: HOSPADM

## 2024-09-23 RX ORDER — MIDODRINE HYDROCHLORIDE 5 MG/1
5 TABLET ORAL ONCE
Status: COMPLETED | OUTPATIENT
Start: 2024-09-23 | End: 2024-09-23

## 2024-09-23 RX ORDER — BUDESONIDE 0.5 MG/2ML
0.5 INHALANT ORAL EVERY 12 HOURS SCHEDULED
Status: DISCONTINUED | OUTPATIENT
Start: 2024-09-23 | End: 2024-09-27 | Stop reason: HOSPADM

## 2024-09-23 RX ORDER — ASPIRIN 81 MG/1
81 TABLET, CHEWABLE ORAL DAILY
Status: DISCONTINUED | OUTPATIENT
Start: 2024-09-23 | End: 2024-09-27 | Stop reason: HOSPADM

## 2024-09-23 RX ORDER — IPRATROPIUM BROMIDE AND ALBUTEROL SULFATE 2.5; .5 MG/3ML; MG/3ML
1 SOLUTION RESPIRATORY (INHALATION)
Status: DISCONTINUED | OUTPATIENT
Start: 2024-09-23 | End: 2024-09-27 | Stop reason: HOSPADM

## 2024-09-23 RX ADMIN — MORPHINE SULFATE 15 MG: 15 TABLET, FILM COATED, EXTENDED RELEASE ORAL at 09:04

## 2024-09-23 RX ADMIN — ATORVASTATIN CALCIUM 40 MG: 40 TABLET, FILM COATED ORAL at 01:26

## 2024-09-23 RX ADMIN — CLOPIDOGREL BISULFATE 75 MG: 75 TABLET ORAL at 12:33

## 2024-09-23 RX ADMIN — FENTANYL CITRATE 50 MCG: 0.05 INJECTION, SOLUTION INTRAMUSCULAR; INTRAVENOUS at 19:46

## 2024-09-23 RX ADMIN — BUDESONIDE 500 MCG: 0.5 SUSPENSION RESPIRATORY (INHALATION) at 05:52

## 2024-09-23 RX ADMIN — FENTANYL CITRATE 50 MCG: 0.05 INJECTION, SOLUTION INTRAMUSCULAR; INTRAVENOUS at 21:48

## 2024-09-23 RX ADMIN — Medication 10 ML: at 01:32

## 2024-09-23 RX ADMIN — ASPIRIN 81 MG CHEWABLE TABLET 81 MG: 81 TABLET CHEWABLE at 12:33

## 2024-09-23 RX ADMIN — ATORVASTATIN CALCIUM 40 MG: 40 TABLET, FILM COATED ORAL at 23:23

## 2024-09-23 RX ADMIN — Medication 10 ML: at 23:29

## 2024-09-23 RX ADMIN — MORPHINE SULFATE 15 MG: 15 TABLET, FILM COATED, EXTENDED RELEASE ORAL at 01:26

## 2024-09-23 RX ADMIN — ALPRAZOLAM 0.5 MG: 0.5 TABLET ORAL at 07:33

## 2024-09-23 RX ADMIN — SODIUM CHLORIDE: 9 INJECTION, SOLUTION INTRAVENOUS at 01:31

## 2024-09-23 RX ADMIN — MORPHINE SULFATE 15 MG: 15 TABLET, FILM COATED, EXTENDED RELEASE ORAL at 23:23

## 2024-09-23 RX ADMIN — ARFORMOTEROL TARTRATE 15 MCG: 15 SOLUTION RESPIRATORY (INHALATION) at 05:51

## 2024-09-23 RX ADMIN — Medication 10 ML: at 09:07

## 2024-09-23 RX ADMIN — IPRATROPIUM BROMIDE AND ALBUTEROL SULFATE 1 DOSE: 2.5; .5 SOLUTION RESPIRATORY (INHALATION) at 18:30

## 2024-09-23 RX ADMIN — ARFORMOTEROL TARTRATE 15 MCG: 15 SOLUTION RESPIRATORY (INHALATION) at 18:31

## 2024-09-23 RX ADMIN — ERGOCALCIFEROL 50000 UNITS: 1.25 CAPSULE ORAL at 09:07

## 2024-09-23 RX ADMIN — SODIUM CHLORIDE, PRESERVATIVE FREE 40 MG: 5 INJECTION INTRAVENOUS at 23:26

## 2024-09-23 RX ADMIN — TOPIRAMATE 50 MG: 25 TABLET, FILM COATED ORAL at 23:23

## 2024-09-23 RX ADMIN — TOPIRAMATE 50 MG: 25 TABLET, FILM COATED ORAL at 01:26

## 2024-09-23 RX ADMIN — BUDESONIDE 500 MCG: 0.5 SUSPENSION RESPIRATORY (INHALATION) at 18:31

## 2024-09-23 RX ADMIN — SODIUM CHLORIDE, PRESERVATIVE FREE 40 MG: 5 INJECTION INTRAVENOUS at 12:36

## 2024-09-23 RX ADMIN — FENTANYL CITRATE 50 MCG: 0.05 INJECTION, SOLUTION INTRAMUSCULAR; INTRAVENOUS at 12:29

## 2024-09-23 RX ADMIN — FENTANYL CITRATE 50 MCG: 0.05 INJECTION, SOLUTION INTRAMUSCULAR; INTRAVENOUS at 17:14

## 2024-09-23 RX ADMIN — FOLIC ACID 1 MG: 1 TABLET ORAL at 09:04

## 2024-09-23 RX ADMIN — ALPRAZOLAM 0.5 MG: 0.5 TABLET ORAL at 23:23

## 2024-09-23 RX ADMIN — AZITHROMYCIN DIHYDRATE 500 MG: 500 INJECTION, POWDER, LYOPHILIZED, FOR SOLUTION INTRAVENOUS at 12:39

## 2024-09-23 RX ADMIN — TOPIRAMATE 50 MG: 25 TABLET, FILM COATED ORAL at 09:04

## 2024-09-23 RX ADMIN — TRAZODONE HYDROCHLORIDE 50 MG: 50 TABLET ORAL at 23:23

## 2024-09-23 RX ADMIN — FENTANYL CITRATE 50 MCG: 50 INJECTION, SOLUTION INTRAMUSCULAR; INTRAVENOUS at 01:31

## 2024-09-23 RX ADMIN — ONDANSETRON 4 MG: 2 INJECTION INTRAMUSCULAR; INTRAVENOUS at 13:04

## 2024-09-23 RX ADMIN — MIDODRINE HYDROCHLORIDE 5 MG: 5 TABLET ORAL at 01:26

## 2024-09-23 ASSESSMENT — PAIN SCALES - GENERAL
PAINLEVEL_OUTOF10: 8
PAINLEVEL_OUTOF10: 7
PAINLEVEL_OUTOF10: 5
PAINLEVEL_OUTOF10: 7
PAINLEVEL_OUTOF10: 8
PAINLEVEL_OUTOF10: 7
PAINLEVEL_OUTOF10: 0
PAINLEVEL_OUTOF10: 8

## 2024-09-23 ASSESSMENT — PAIN DESCRIPTION - DESCRIPTORS
DESCRIPTORS: STABBING
DESCRIPTORS: SHARP
DESCRIPTORS: ACHING;SHARP

## 2024-09-23 ASSESSMENT — PAIN DESCRIPTION - ORIENTATION
ORIENTATION: POSTERIOR
ORIENTATION: RIGHT

## 2024-09-23 ASSESSMENT — PAIN DESCRIPTION - LOCATION
LOCATION: LEG;RIB CAGE
LOCATION: LEG
LOCATION: BACK;LEG

## 2024-09-23 NOTE — PLAN OF CARE
Problem: Safety - Adult  Goal: Free from fall injury  Outcome: Progressing     Problem: Discharge Planning  Goal: Discharge to home or other facility with appropriate resources  Outcome: Progressing     Problem: Pain  Goal: Verbalizes/displays adequate comfort level or baseline comfort level  Outcome: Progressing     Problem: Skin/Tissue Integrity  Goal: Absence of new skin breakdown  Description: 1.  Monitor for areas of redness and/or skin breakdown  2.  Assess vascular access sites hourly  3.  Every 4-6 hours minimum:  Change oxygen saturation probe site  4.  Every 4-6 hours:  If on nasal continuous positive airway pressure, respiratory therapy assess nares and determine need for appliance change or resting period.  Outcome: Progressing     Problem: ABCDS Injury Assessment  Goal: Absence of physical injury  Outcome: Progressing     Problem: Respiratory - Adult  Goal: Achieves optimal ventilation and oxygenation  Outcome: Progressing     Problem: Cardiovascular - Adult  Goal: Maintains optimal cardiac output and hemodynamic stability  Outcome: Progressing  Goal: Absence of cardiac dysrhythmias or at baseline  Outcome: Progressing     Problem: Skin/Tissue Integrity - Adult  Goal: Skin integrity remains intact  Outcome: Progressing     Problem: Musculoskeletal - Adult  Goal: Return mobility to safest level of function  Outcome: Progressing     Problem: Hematologic - Adult  Goal: Maintains hematologic stability  Outcome: Progressing

## 2024-09-23 NOTE — PROGRESS NOTES
rate, normal S1 and S2 and no carotid bruits  Abdomen: soft, non-tender, non-distended, normal bowel sounds, no masses or organomegaly  Extremities: no cyanosis, no clubbing and no edema  Neurologic: no cranial nerve deficit and speech normal        Recent Labs     09/22/24  1418      K 3.9   CL 98   CO2 27   BUN 9   CREATININE 0.8   GLUCOSE 127*   CALCIUM 9.0       Recent Labs     09/22/24  1418 09/22/24  1904 09/23/24  0715   WBC 10.2 7.8 8.5   RBC 4.21 3.66 3.88   HGB 12.1 10.4* 11.1*   HCT 35.7 31.0* 33.4*   MCV 84.8 84.7 86.1   MCH 28.7 28.4 28.6   MCHC 33.9 33.5 33.2   RDW 13.8 13.8 13.8    129* 164   MPV 9.7 9.7 9.5       35 minutes or more spent in chart review, examination, evaluation, counseling and review of medications, discussing plan of care with collaborating physician, and documentation.     Electronically signed by Tamara Medina DO on 9/23/2024 at 7:47 AM

## 2024-09-23 NOTE — PROGRESS NOTES
4 Eyes Skin Assessment     NAME:  Niru Chaves  YOB: 1960  MEDICAL RECORD NUMBER:  [id number]    The patient is being assessed for  Admission    I agree that at least one RN has performed a thorough Head to Toe Skin Assessment on the patient. ALL assessment sites listed below have been assessed.      Areas assessed by both nurses:    Head, Face, Ears, Shoulders, Back, Chest, Arms, Elbows, Hands, Sacrum. Buttock, Coccyx, Ischium, Legs. Feet and Heels, Under Medical Devices , and Other bilateral heels cracked and very dry, right leg reddened and swollen Current DVT, Bilateral under breast region slightly pink        Does the Patient have a Wound? No noted wound(s)       Adi Prevention initiated by RN: Yes  Wound Care Orders initiated by RN: No    Pressure Injury (Stage 3,4, Unstageable, DTI, NWPT, and Complex wounds) if present, place Wound referral order by RN under : No    New Ostomies, if present place, Ostomy referral order under : Yes     Nurse 1 eSignature: Electronically signed by Aubree Sepulveda RN on 9/23/24 at 10:16 AM EDT    **SHARE this note so that the co-signing nurse can place an eSignature**    Nurse 2 eSignature: {Esignature:[id number]}

## 2024-09-23 NOTE — CONSULTS
ICU Intensivist Attestation:    I saw, examined, and discussed the patient with Nara QUISPE and agree with her assessment and plan.    I went to see the patient myself independently and examined her myself discussed her care with the nurses and have reviewed her imaging studies including the CT angiogram.    There is a moderate clot burden present with most of the clot in the right pulmonary artery and right lower lobe artery.  There is a small left main pulmonary clot as well.    This probably originates from the extensive DVT seen in her right leg ranging from her iliac vein to her popliteal vein and even peroneal vein although this latter would not be the source of clots.    The patient is complaining of pleuritic chest pain but cannot tolerate NSAIDs or variance such as Toradol.  She does get some pain relief with fentanyl which she is receiving in addition to her long-acting chronic oral morphine.    The patient also has a history of anxiety disorder and takes Xanax 1 mg 3 times daily which we will continue.  We will resume her aspirin and Plavix due to her extensive arteriosclerotic cardiovascular disease but make sure she is on IV Protonix twice daily.    Echocardiogram has been ordered but not done yet.  Because of her extensive DVT and risk for propagation of additional clots into her lung, she will remain at strict bedrest for at least 24 more hours.    Electronically signed by Shantanu Encarnacion MD on 9/23/2024 at 9:20 AM  Assessment   Provoked Pulmonary Emboli without Right heart strain 2/2 DVT in Right leg  DVT of right leg  COPD  CAD- on ASA/plavix hold due to petechiae/low platelets    HTN- now hypotensive  Hyperlipidemia on Lipitor  CHF  Chronic Pain/DDD,DJD-on MS contin    PLAN:  Heparin drip per protocol  Bronchodilators Duo nebs q4 with Pulmicort BID  Nicotine Patch  ECHO with bubble study  Protonix 40 mg IV daily  Daily BMP, Mg, Phos, CBC, Protime/INR  Q6 PTT, lactic and troponin  MRSA

## 2024-09-23 NOTE — PROGRESS NOTES
Found a live bed bug on sheets when clothes were being removed when patient asked if she has bed bugs at home she said I might

## 2024-09-23 NOTE — H&P
[facility] Hospitalist Group   History and Physical      CHIEF COMPLAINT:  right leg pain/swelling    History of Present Illness:  64 y.o. female with a history of COPD, CAD, CHF, HTN, HLD, tobacco use- 1 PPD, DJD, DDD, chronic pain on chronic narcotics, CVA presents with right leg pain/swelling which started 2 days ago.  States the pain feels like charley horses.  A couple of hours ago developed right sided chest pain with inspiration.  Noted petechial rash lower extremities about a week ago.  No recent trips or surgery.  Takes plavix and ASA.  Workup in ED significant for glucose 127, troponin 10 and 9, hgb 12.1, repeat after IVF bolus 10.4, platelet 140 initially and repeat 129.  RLE U/S extensive DVT.  CTA chest right sided PE without evidence of heart strain.  Hypotensive on arrival, given 2.5L bolus, fentanyl, zofran, norco, started on heparin drip.    Informant(s) for H&P: patient, chart    REVIEW OF SYSTEMS:  no fevers, chills, n/v, ha, vision/hearing changes, wt changes, hot/cold flashes, diarrhea, constipation, dysuria/hematuria unless noted in HPI. Complete ROS performed with the patient and is otherwise negative.      PMH:  COPD  CAD  MI x 2  CHF  HTN  HLD  Chronic pain from DJD, DDD  Seizures  Gastroparesis  Anxiety  CVA x 2    Surgical History:  Angioplasty 2019  PTCA x 5 separate occasions  Appendectomy  Cholecystectomy  Colonoscopy 2019 done in [place]  EGD 2019  Hysterectomy 1985 with BSO due to endometriosis  Shoulder arthroscopy  Tonsillectomy    Medications Prior to Admission:    Prior to Admission medications      ALPRAZolam (XANAX) 0.5 mg oral tablet Take 1 tablet by mouth 2 times a day as needed Max Daily Amount: 1 mg   naloxone (NARCAN) 4 mg/actuation nasl nasal spray Use 1 spray in the nose as needed for suspected overdose. If no response, give second dose after 2-3 minutes.   nicotine (NICODERM CQ) 21 mg/24 hr TD transdermal patch Place 1 Patch onto the skin daily

## 2024-09-24 ENCOUNTER — APPOINTMENT (OUTPATIENT)
Dept: GENERAL RADIOLOGY | Age: 64
End: 2024-09-24
Payer: COMMERCIAL

## 2024-09-24 LAB
ALBUMIN SERPL-MCNC: 2.9 G/DL (ref 3.5–5.2)
ALP SERPL-CCNC: 46 U/L (ref 35–104)
ALT SERPL-CCNC: <5 U/L (ref 0–32)
ANION GAP SERPL CALCULATED.3IONS-SCNC: 7 MMOL/L (ref 7–16)
AST SERPL-CCNC: 8 U/L (ref 0–31)
BASOPHILS # BLD: 0 K/UL (ref 0–0.2)
BASOPHILS NFR BLD: 0 % (ref 0–2)
BILIRUB SERPL-MCNC: 0.4 MG/DL (ref 0–1.2)
BUN SERPL-MCNC: 8 MG/DL (ref 6–23)
CALCIUM SERPL-MCNC: 8.2 MG/DL (ref 8.6–10.2)
CHLORIDE SERPL-SCNC: 109 MMOL/L (ref 98–107)
CO2 SERPL-SCNC: 23 MMOL/L (ref 22–29)
CREAT SERPL-MCNC: 0.8 MG/DL (ref 0.5–1)
EOSINOPHIL # BLD: 0.19 K/UL (ref 0.05–0.5)
EOSINOPHILS RELATIVE PERCENT: 4 % (ref 0–6)
ERYTHROCYTE [DISTWIDTH] IN BLOOD BY AUTOMATED COUNT: 13.8 % (ref 11.5–15)
GFR, ESTIMATED: 86 ML/MIN/1.73M2
GLUCOSE SERPL-MCNC: 107 MG/DL (ref 74–99)
HCT VFR BLD AUTO: 29 % (ref 34–48)
HGB BLD-MCNC: 9.8 G/DL (ref 11.5–15.5)
IMM GRANULOCYTES # BLD AUTO: <0.03 K/UL (ref 0–0.58)
IMM GRANULOCYTES NFR BLD: 0 % (ref 0–5)
INR PPP: 1.3
LYMPHOCYTES NFR BLD: 1.04 K/UL (ref 1.5–4)
LYMPHOCYTES RELATIVE PERCENT: 21 % (ref 20–42)
MAGNESIUM SERPL-MCNC: 2 MG/DL (ref 1.6–2.6)
MCH RBC QN AUTO: 28.2 PG (ref 26–35)
MCHC RBC AUTO-ENTMCNC: 33.8 G/DL (ref 32–34.5)
MCV RBC AUTO: 83.6 FL (ref 80–99.9)
MICROORGANISM SPEC CULT: NORMAL
MONOCYTES NFR BLD: 0.46 K/UL (ref 0.1–0.95)
MONOCYTES NFR BLD: 9 % (ref 2–12)
NEUTROPHILS NFR BLD: 65 % (ref 43–80)
NEUTS SEG NFR BLD: 3.16 K/UL (ref 1.8–7.3)
PARTIAL THROMBOPLASTIN TIME: 168.3 SEC (ref 24.5–35.1)
PARTIAL THROMBOPLASTIN TIME: 95.6 SEC (ref 24.5–35.1)
PHOSPHATE SERPL-MCNC: 3.5 MG/DL (ref 2.5–4.5)
PLATELET # BLD AUTO: 166 K/UL (ref 130–450)
PMV BLD AUTO: 9.2 FL (ref 7–12)
POTASSIUM SERPL-SCNC: 3.4 MMOL/L (ref 3.5–5)
PROT SERPL-MCNC: 5.6 G/DL (ref 6.4–8.3)
PROTHROMBIN TIME: 14.4 SEC (ref 9.3–12.4)
RBC # BLD AUTO: 3.47 M/UL (ref 3.5–5.5)
SODIUM SERPL-SCNC: 139 MMOL/L (ref 132–146)
SPECIMEN DESCRIPTION: NORMAL
WBC OTHER # BLD: 4.9 K/UL (ref 4.5–11.5)

## 2024-09-24 PROCEDURE — 85610 PROTHROMBIN TIME: CPT

## 2024-09-24 PROCEDURE — 94640 AIRWAY INHALATION TREATMENT: CPT

## 2024-09-24 PROCEDURE — 2060000000 HC ICU INTERMEDIATE R&B

## 2024-09-24 PROCEDURE — 6360000002 HC RX W HCPCS: Performed by: FAMILY MEDICINE

## 2024-09-24 PROCEDURE — 6370000000 HC RX 637 (ALT 250 FOR IP): Performed by: INTERNAL MEDICINE

## 2024-09-24 PROCEDURE — 6360000002 HC RX W HCPCS: Performed by: INTERNAL MEDICINE

## 2024-09-24 PROCEDURE — 99291 CRITICAL CARE FIRST HOUR: CPT | Performed by: INTERNAL MEDICINE

## 2024-09-24 PROCEDURE — 6370000000 HC RX 637 (ALT 250 FOR IP): Performed by: FAMILY MEDICINE

## 2024-09-24 PROCEDURE — 80053 COMPREHEN METABOLIC PANEL: CPT

## 2024-09-24 PROCEDURE — 2580000003 HC RX 258: Performed by: FAMILY MEDICINE

## 2024-09-24 PROCEDURE — 85730 THROMBOPLASTIN TIME PARTIAL: CPT

## 2024-09-24 PROCEDURE — 36415 COLL VENOUS BLD VENIPUNCTURE: CPT

## 2024-09-24 PROCEDURE — 99232 SBSQ HOSP IP/OBS MODERATE 35: CPT | Performed by: FAMILY MEDICINE

## 2024-09-24 PROCEDURE — 2580000003 HC RX 258: Performed by: INTERNAL MEDICINE

## 2024-09-24 PROCEDURE — 85025 COMPLETE CBC W/AUTO DIFF WBC: CPT

## 2024-09-24 PROCEDURE — 84100 ASSAY OF PHOSPHORUS: CPT

## 2024-09-24 PROCEDURE — 71045 X-RAY EXAM CHEST 1 VIEW: CPT

## 2024-09-24 PROCEDURE — 83735 ASSAY OF MAGNESIUM: CPT

## 2024-09-24 PROCEDURE — 6360000002 HC RX W HCPCS: Performed by: EMERGENCY MEDICINE

## 2024-09-24 RX ORDER — QUETIAPINE FUMARATE 100 MG/1
300 TABLET, FILM COATED ORAL NIGHTLY
Status: DISCONTINUED | OUTPATIENT
Start: 2024-09-24 | End: 2024-09-27 | Stop reason: HOSPADM

## 2024-09-24 RX ORDER — LIDOCAINE 4 G/G
1 PATCH TOPICAL DAILY
Status: DISCONTINUED | OUTPATIENT
Start: 2024-09-24 | End: 2024-09-27 | Stop reason: HOSPADM

## 2024-09-24 RX ORDER — POTASSIUM CHLORIDE 7.45 MG/ML
10 INJECTION INTRAVENOUS ONCE
Status: DISCONTINUED | OUTPATIENT
Start: 2024-09-24 | End: 2024-09-24

## 2024-09-24 RX ORDER — LIDOCAINE 4 G/G
1 PATCH TOPICAL
Status: DISCONTINUED | OUTPATIENT
Start: 2024-09-24 | End: 2024-09-24

## 2024-09-24 RX ORDER — POTASSIUM CHLORIDE 1500 MG/1
40 TABLET, EXTENDED RELEASE ORAL ONCE
Status: COMPLETED | OUTPATIENT
Start: 2024-09-24 | End: 2024-09-24

## 2024-09-24 RX ORDER — QUETIAPINE FUMARATE 100 MG/1
300 TABLET, FILM COATED ORAL NIGHTLY
Status: DISCONTINUED | OUTPATIENT
Start: 2024-09-24 | End: 2024-09-24 | Stop reason: SDUPTHER

## 2024-09-24 RX ADMIN — ASPIRIN 81 MG CHEWABLE TABLET 81 MG: 81 TABLET CHEWABLE at 09:13

## 2024-09-24 RX ADMIN — TRAZODONE HYDROCHLORIDE 50 MG: 50 TABLET ORAL at 21:39

## 2024-09-24 RX ADMIN — ARFORMOTEROL TARTRATE 15 MCG: 15 SOLUTION RESPIRATORY (INHALATION) at 18:23

## 2024-09-24 RX ADMIN — TOPIRAMATE 50 MG: 25 TABLET, FILM COATED ORAL at 09:15

## 2024-09-24 RX ADMIN — FOLIC ACID 1 MG: 1 TABLET ORAL at 09:13

## 2024-09-24 RX ADMIN — IPRATROPIUM BROMIDE AND ALBUTEROL SULFATE 1 DOSE: 2.5; .5 SOLUTION RESPIRATORY (INHALATION) at 09:39

## 2024-09-24 RX ADMIN — ALPRAZOLAM 0.5 MG: 0.5 TABLET ORAL at 11:05

## 2024-09-24 RX ADMIN — IPRATROPIUM BROMIDE AND ALBUTEROL SULFATE 1 DOSE: 2.5; .5 SOLUTION RESPIRATORY (INHALATION) at 06:55

## 2024-09-24 RX ADMIN — BUDESONIDE 500 MCG: 0.5 SUSPENSION RESPIRATORY (INHALATION) at 06:55

## 2024-09-24 RX ADMIN — QUETIAPINE 300 MG: 100 TABLET ORAL at 21:39

## 2024-09-24 RX ADMIN — FENTANYL CITRATE 50 MCG: 0.05 INJECTION, SOLUTION INTRAMUSCULAR; INTRAVENOUS at 21:49

## 2024-09-24 RX ADMIN — FENTANYL CITRATE 50 MCG: 0.05 INJECTION, SOLUTION INTRAMUSCULAR; INTRAVENOUS at 19:37

## 2024-09-24 RX ADMIN — ARFORMOTEROL TARTRATE 15 MCG: 15 SOLUTION RESPIRATORY (INHALATION) at 06:55

## 2024-09-24 RX ADMIN — MORPHINE SULFATE 15 MG: 15 TABLET, FILM COATED, EXTENDED RELEASE ORAL at 22:32

## 2024-09-24 RX ADMIN — FENTANYL CITRATE 50 MCG: 0.05 INJECTION, SOLUTION INTRAMUSCULAR; INTRAVENOUS at 13:33

## 2024-09-24 RX ADMIN — APIXABAN 10 MG: 5 TABLET, FILM COATED ORAL at 09:13

## 2024-09-24 RX ADMIN — VENLAFAXINE HYDROCHLORIDE 150 MG: 75 CAPSULE, EXTENDED RELEASE ORAL at 09:15

## 2024-09-24 RX ADMIN — FENTANYL CITRATE 50 MCG: 0.05 INJECTION, SOLUTION INTRAMUSCULAR; INTRAVENOUS at 07:33

## 2024-09-24 RX ADMIN — ATORVASTATIN CALCIUM 40 MG: 40 TABLET, FILM COATED ORAL at 21:39

## 2024-09-24 RX ADMIN — Medication 10 ML: at 09:16

## 2024-09-24 RX ADMIN — AZITHROMYCIN DIHYDRATE 500 MG: 500 INJECTION, POWDER, LYOPHILIZED, FOR SOLUTION INTRAVENOUS at 09:28

## 2024-09-24 RX ADMIN — SODIUM CHLORIDE, PRESERVATIVE FREE 40 MG: 5 INJECTION INTRAVENOUS at 09:14

## 2024-09-24 RX ADMIN — IPRATROPIUM BROMIDE AND ALBUTEROL SULFATE 1 DOSE: 2.5; .5 SOLUTION RESPIRATORY (INHALATION) at 18:23

## 2024-09-24 RX ADMIN — FENTANYL CITRATE 50 MCG: 0.05 INJECTION, SOLUTION INTRAMUSCULAR; INTRAVENOUS at 09:35

## 2024-09-24 RX ADMIN — IPRATROPIUM BROMIDE AND ALBUTEROL SULFATE 1 DOSE: 2.5; .5 SOLUTION RESPIRATORY (INHALATION) at 15:11

## 2024-09-24 RX ADMIN — CLOPIDOGREL BISULFATE 75 MG: 75 TABLET ORAL at 09:13

## 2024-09-24 RX ADMIN — BUDESONIDE 500 MCG: 0.5 SUSPENSION RESPIRATORY (INHALATION) at 18:23

## 2024-09-24 RX ADMIN — TOPIRAMATE 50 MG: 25 TABLET, FILM COATED ORAL at 21:39

## 2024-09-24 RX ADMIN — ALPRAZOLAM 0.5 MG: 0.5 TABLET ORAL at 22:32

## 2024-09-24 RX ADMIN — HEPARIN SODIUM AND DEXTROSE 15 UNITS/KG/HR: 10000; 5 INJECTION INTRAVENOUS at 00:41

## 2024-09-24 RX ADMIN — QUETIAPINE 300 MG: 100 TABLET ORAL at 02:21

## 2024-09-24 RX ADMIN — SODIUM CHLORIDE, PRESERVATIVE FREE 40 MG: 5 INJECTION INTRAVENOUS at 21:40

## 2024-09-24 RX ADMIN — FENTANYL CITRATE 50 MCG: 0.05 INJECTION, SOLUTION INTRAMUSCULAR; INTRAVENOUS at 16:57

## 2024-09-24 RX ADMIN — MORPHINE SULFATE 15 MG: 15 TABLET, FILM COATED, EXTENDED RELEASE ORAL at 11:05

## 2024-09-24 RX ADMIN — Medication 10 ML: at 19:39

## 2024-09-24 RX ADMIN — ONDANSETRON 4 MG: 2 INJECTION INTRAMUSCULAR; INTRAVENOUS at 21:42

## 2024-09-24 RX ADMIN — APIXABAN 10 MG: 5 TABLET, FILM COATED ORAL at 21:39

## 2024-09-24 RX ADMIN — POTASSIUM CHLORIDE 40 MEQ: 1500 TABLET, EXTENDED RELEASE ORAL at 11:09

## 2024-09-24 RX ADMIN — FENTANYL CITRATE 50 MCG: 0.05 INJECTION, SOLUTION INTRAMUSCULAR; INTRAVENOUS at 01:49

## 2024-09-24 RX ADMIN — FENTANYL CITRATE 50 MCG: 0.05 INJECTION, SOLUTION INTRAMUSCULAR; INTRAVENOUS at 23:53

## 2024-09-24 ASSESSMENT — PAIN DESCRIPTION - ORIENTATION
ORIENTATION: RIGHT

## 2024-09-24 ASSESSMENT — PAIN SCALES - GENERAL
PAINLEVEL_OUTOF10: 6
PAINLEVEL_OUTOF10: 7
PAINLEVEL_OUTOF10: 8
PAINLEVEL_OUTOF10: 8
PAINLEVEL_OUTOF10: 7
PAINLEVEL_OUTOF10: 5
PAINLEVEL_OUTOF10: 7
PAINLEVEL_OUTOF10: 5
PAINLEVEL_OUTOF10: 7
PAINLEVEL_OUTOF10: 5
PAINLEVEL_OUTOF10: 5
PAINLEVEL_OUTOF10: 8
PAINLEVEL_OUTOF10: 5
PAINLEVEL_OUTOF10: 6
PAINLEVEL_OUTOF10: 8
PAINLEVEL_OUTOF10: 5
PAINLEVEL_OUTOF10: 5

## 2024-09-24 ASSESSMENT — PAIN DESCRIPTION - LOCATION
LOCATION: CHEST
LOCATION: RIB CAGE
LOCATION: CHEST
LOCATION: RIB CAGE

## 2024-09-24 ASSESSMENT — PAIN DESCRIPTION - DESCRIPTORS
DESCRIPTORS: STABBING;SORE
DESCRIPTORS: STABBING
DESCRIPTORS: ACHING;DISCOMFORT;SORE
DESCRIPTORS: STABBING;SORE
DESCRIPTORS: STABBING;SHARP
DESCRIPTORS: STABBING;SORE
DESCRIPTORS: STABBING

## 2024-09-24 ASSESSMENT — PAIN - FUNCTIONAL ASSESSMENT
PAIN_FUNCTIONAL_ASSESSMENT: ACTIVITIES ARE NOT PREVENTED
PAIN_FUNCTIONAL_ASSESSMENT: PREVENTS OR INTERFERES SOME ACTIVE ACTIVITIES AND ADLS

## 2024-09-24 NOTE — PROGRESS NOTES
4 Eyes Skin Assessment     NAME:  Niru Chaves  YOB: 1960  MEDICAL RECORD NUMBER:  [id number]    The patient is being assessed for  Transfer to New Unit    I agree that at least one RN has performed a thorough Head to Toe Skin Assessment on the patient. ALL assessment sites listed below have been assessed.      Areas assessed by both nurses:    Head, Face, Ears, Shoulders, Back, Chest, Arms, Elbows, Hands, Sacrum. Buttock, Coccyx, Ischium, Legs. Feet and Heels, and Under Medical Devices         Does the Patient have a Wound? No noted wound(s)       Adi Prevention initiated by RN: No  Wound Care Orders initiated by RN: No    Pressure Injury (Stage 3,4, Unstageable, DTI, NWPT, and Complex wounds) if present, place Wound referral order by RN under : No    New Ostomies, if present place, Ostomy referral order under : No     Nurse 1 eSignature: Electronically signed by Garrett Mead RN on 9/24/24 at 4:17 PM EDT    **SHARE this note so that the co-signing nurse can place an eSignature**    Nurse 2 eSignature: Electronically signed by Radha Dunn RN on 9/24/24 at 4:21 PM EDT

## 2024-09-24 NOTE — PROGRESS NOTES
Patient notified of order to transfer and room assignment. Message left for patient's brother per her request. Voice message left.

## 2024-09-24 NOTE — PLAN OF CARE
Problem: Safety - Adult  Goal: Free from fall injury  9/24/2024 0632 by Gill Hidalgo, RN  Outcome: Progressing     Problem: Respiratory - Adult  Goal: Achieves optimal ventilation and oxygenation  9/24/2024 0632 by Gill Hidalgo, RN  Outcome: Progressing

## 2024-09-24 NOTE — PLAN OF CARE
Problem: Safety - Adult  Goal: Free from fall injury  9/24/2024 1816 by Garrett Mead RN  Outcome: Progressing  9/24/2024 1816 by Garrett Mead RN  Outcome: Progressing  9/24/2024 0632 by Gill Hidalgo RN  Outcome: Progressing     Problem: Discharge Planning  Goal: Discharge to home or other facility with appropriate resources  9/24/2024 1816 by Garrett Mead RN  Outcome: Progressing  9/24/2024 1816 by Garrett Mead RN  Outcome: Progressing     Problem: Pain  Goal: Verbalizes/displays adequate comfort level or baseline comfort level  9/24/2024 1816 by Garrett Mead RN  Outcome: Progressing  9/24/2024 1816 by Garrett Mead RN  Outcome: Progressing     Problem: Skin/Tissue Integrity  Goal: Absence of new skin breakdown  Description: 1.  Monitor for areas of redness and/or skin breakdown  2.  Assess vascular access sites hourly  3.  Every 4-6 hours minimum:  Change oxygen saturation probe site  4.  Every 4-6 hours:  If on nasal continuous positive airway pressure, respiratory therapy assess nares and determine need for appliance change or resting period.  9/24/2024 1816 by Garrett Mead RN  Outcome: Progressing  9/24/2024 1816 by Garrett Mead RN  Outcome: Progressing     Problem: ABCDS Injury Assessment  Goal: Absence of physical injury  9/24/2024 1816 by Garrett Mead RN  Outcome: Progressing  9/24/2024 1816 by Garrett Mead RN  Outcome: Progressing     Problem: Respiratory - Adult  Goal: Achieves optimal ventilation and oxygenation  9/24/2024 1816 by Garrett Mead RN  Outcome: Progressing  9/24/2024 1816 by Garrett Mead RN  Outcome: Progressing  9/24/2024 0632 by Gill Hidalgo RN  Outcome: Progressing     Problem: Cardiovascular - Adult  Goal: Maintains optimal cardiac output and hemodynamic stability  9/24/2024 1816 by Garrett Mead RN  Outcome: Progressing  9/24/2024 1816 by Garrett Mead RN  Outcome: Progressing  Goal: Absence of cardiac dysrhythmias or at

## 2024-09-24 NOTE — PLAN OF CARE
Problem: Skin/Tissue Integrity - Adult  Goal: Skin integrity remains intact  Recent Flowsheet Documentation  Taken 9/24/2024 0800 by Jamia Lozano RN  Skin Integrity Remains Intact: Monitor for areas of redness and/or skin breakdown     Problem: Musculoskeletal - Adult  Goal: Return mobility to safest level of function  Recent Flowsheet Documentation  Taken 9/24/2024 0800 by Jamia Lozano RN  Return Mobility to Safest Level of Function: Assess patient stability and activity tolerance for standing, transferring and ambulating with or without assistive devices     Problem: Hematologic - Adult  Goal: Maintains hematologic stability  Recent Flowsheet Documentation  Taken 9/24/2024 0800 by Jamia Lozano RN  Maintains hematologic stability: Assess for signs and symptoms of bleeding or hemorrhage

## 2024-09-24 NOTE — PROGRESS NOTES
Pulmonary/Critical Care Progress Note    We are following patient for bilateral pulmonary emboli with moderate clot burden with most of the clot in the distal right main pulmonary artery but suspect pulmonary infarction in the right lower lung field, COPD exacerbation, possible pneumonia, extensive right leg clot ranging from iliac vein to popliteal vein, grade 1 diastolic dysfunction    SUBJECTIVE:  The patient did well overnight but is still having some pain in the right leg presumably from her DVT which is fairly extensive and having pleuritic chest pain in the right lower lung field where I now hear a pleural rub, probably consistent with pulmonary infarction.    The patient has a chest x-ray that is pending.  We will check it when it is done.    In the meantime, she remains on aspirin and clopidogrel for arteriosclerotic cardiovascular disease.  She is also on atorvastatin.    From a prophylactic standpoint, she will continue on IV Protonix 40 mg every 12 hours.    She is tolerating aerosolized bronchodilators as well as inhaled steroids.  She is currently not on IV steroids which I think can still be avoided based on her current condition.    The patient has a mild degree of hypokalemia and will receive 40 mEq of potassium chloride today.  She will also continue on azithromycin because of her COPD and questionable pneumonia.  Other home medications for anxiety and possible seizure disorder include topiramate trazodone and venlafaxine, as well as quetiapine at night.    I think it is best that we continue her on bedrest for 1 more day given the extensiveness of her right lower extremity DVT.  She is stable enough however to be transitioned to oral a apixaban 10 mg p.o. twice daily x 7 days and then 5 mg p.o. twice daily.    We are awaiting the thrombotic studies that are available to us including cardiolipin antibodies, prothrombin mutation, and factor V Leiden studies that are all genetic and not affected by  described above.         XR CHEST PORTABLE   Final Result   No evidence of pneumonia or pleural effusion.                 PROBLEM LIST:  Principal Problem:    Shock circulatory ([facility])  Active Problems:    Atherosclerosis of native coronary artery of native heart without angina pectoris    Chronic obstructive pulmonary disease (HCC)    Hypertension    CHF (congestive heart failure) (HCC)    Hyperlipidemia    Chronic pain    DJD (degenerative joint disease)    Hx of degenerative disc disease    History of MI (myocardial infarction)    Gastroparesis    Anxiety    History of CVA (cerebrovascular accident)    Pulmonary embolism without acute cor pulmonale (HCC)    Deep venous thrombosis (HCC)    Chest pain    Shortness of breath  Resolved Problems:    * No resolved hospital problems. *      IMPRESSION:  Pulmonary embolism with moderate clot burden  Right sided pulmonary infarction  Extensive right DVT  Possible hypercoagulable state  COPD with exacerbation  Suspect additional component of pneumonia  Chronic pain from osteoarthritis  Arteriosclerotic cardiovascular disease with history of myocardial infarction still on aspirin and Plavix  History of prior stroke on aspirin and Plavix    PLAN:  Discontinue IV heparin  Begin apixaban protocol  Continue antibiotics and things  Continue inhaled steroids and aerosolized bronchodilators  Keep at bedrest 1 more day  Continue Protonix prophylaxis  Continue dual antiplatelet therapy for arteriosclerotic cardiovascular disease including stroke and MI in the past  Degenerative joint disease    ATTESTATION:  ICU Staff Physician note of personal involvement in Care  As the attending physician, I certify that I personally reviewed the patient’s history and personnally examined the patient to confirm the physical findings described above,  And that I reviewed the relevant imaging studies and available reports.  I also discussed the differential diagnosis and all of the proposed

## 2024-09-24 NOTE — PROGRESS NOTES
[facility] Hospitalist Progress Note    Admitting Date and Time: 9/22/2024  1:18 PM  Admit Dx: Shortness of breath [R06.02]  Shock circulatory (HCC) [R57.9]  Acute deep vein thrombosis (DVT) of other specified vein of right lower extremity (HCC) [I82.491]  Chest pain, unspecified type [R07.9]  Chronic congestive heart failure, unspecified heart failure type (HCC) [I50.9]  Pulmonary embolism without acute cor pulmonale, unspecified chronicity, unspecified pulmonary embolism type (HCC) [I26.99]      Assessment:    Principal Problem:    Shock circulatory (HCC)  Active Problems:    Atherosclerosis of native coronary artery of native heart without angina pectoris    Chronic obstructive pulmonary disease (HCC)    Hypertension    CHF (congestive heart failure) (HCC)    Hyperlipidemia    Chronic pain    DJD (degenerative joint disease)    Hx of degenerative disc disease    History of MI (myocardial infarction)    Gastroparesis    Anxiety    History of CVA (cerebrovascular accident)    Pulmonary embolism without acute cor pulmonale (HCC)    Deep venous thrombosis (HCC)    Chest pain    Shortness of breath  Resolved Problems:    * No resolved hospital problems. *      Plan:  9/22/2024  Circulatory shock  PE  DVT  - critical care c/s  - IVF  - heparin gtt started in ED  - hold home BP meds  - symptom management     Panlobular emphysema  Tobacco use  - nicotine patch  - Duonebs  - brovana/pulmicort     DJD, DDD, polyarthralgia  Chronic pain  Chronic narcotics  - continue home MS contin  - senokot     CAD on plavix/ASA  HTN  HLD  CHF  - echo  - hold BP meds due to hypotension  - on heparin gtt, has petechiae and platelets dropping, hold asa/plavix     9/23/2024  Heparin gtt  --pt to icu per pulm/cc  --control pain  --echo ordered and pending, but no signs of heart strain on imaging thus far or on EKG  --monitor aptt  --transition to po anticoagulation at [facility], likely lifelong anticoag  --bronchodilators via  and reactive to light, extraocular eye movements intact, conjunctivae normal  Neck: neck supple and non tender without mass   Pulmonary/Chest: diminished bl, scattered wheezing bilaterally, on room air  Cardiovascular: normal rate, normal S1 and S2 and no carotid bruits  Abdomen: soft, non-tender, non-distended, normal bowel sounds, no masses or organomegaly  Extremities: no cyanosis, no clubbing and no edema  Neurologic: no cranial nerve deficit and speech normal        Recent Labs     09/22/24  1418 09/23/24  0715 09/24/24  0443    139 139   K 3.9 4.0 3.4*   CL 98 107 109*   CO2 27 23 23   BUN 9 7 8   CREATININE 0.8 0.7 0.8   GLUCOSE 127* 111* 107*   CALCIUM 9.0 8.0* 8.2*       Recent Labs     09/22/24  1904 09/23/24  0715 09/24/24  0443   WBC 7.8 8.5 4.9   RBC 3.66 3.88 3.47*   HGB 10.4* 11.1* 9.8*   HCT 31.0* 33.4* 29.0*   MCV 84.7 86.1 83.6   MCH 28.4 28.6 28.2   MCHC 33.5 33.2 33.8   RDW 13.8 13.8 13.8   * 164 166   MPV 9.7 9.5 9.2       35 minutes or more spent in chart review, examination, evaluation, counseling and review of medications, discussing plan of care with collaborating physician, and documentation.     Electronically signed by Tamara Medina DO on 9/24/2024 at 9:25 AM

## 2024-09-24 NOTE — ACP (ADVANCE CARE PLANNING)
Advance Care Planning   Healthcare Decision Maker:    Primary Decision Maker: Abdiaziz Arcos - Brother/Sister - [phone]    Today we documented Decision Maker(s) consistent with ACP documents on file.      New DPOA-HC documents completed with pt.     Electronically signed by Mireya Saab RN-BC on 9/24/2024 at 8:47 AM

## 2024-09-24 NOTE — CARE COORDINATION
Case Management Assessment  Initial Evaluation    Date/Time of Evaluation: 9/24/2024 9:05 AM  Assessment Completed by: Mireya Saab RN    If patient is discharged prior to next notation, then this note serves as note for discharge by case management.    Patient Name: Niru Chaves                   YOB: 1960  Diagnosis: Shortness of breath [R06.02]  Shock circulatory (HCC) [R57.9]  Acute deep vein thrombosis (DVT) of other specified vein of right lower extremity (HCC) [I82.491]  Chest pain, unspecified type [R07.9]  Chronic congestive heart failure, unspecified heart failure type (HCC) [I50.9]  Pulmonary embolism without acute cor pulmonale, unspecified chronicity, unspecified pulmonary embolism type (HCC) [I26.99]                   Date / Time: 9/22/2024  1:18 PM    Patient Admission Status: Inpatient   Readmission Risk (Low < 19, Mod (19-27), High > 27): Readmission Risk Score: 12.7    Current PCP: Crystal Paulino MD  PCP verified by CM? Yes    Chart Reviewed: Yes      History Provided by: Patient  Patient Orientation: Alert and Oriented, Person, Place, Situation, Self    Patient Cognition: Alert    Hospitalization in the last 30 days (Readmission):  No        Advance Directives:      Code Status: Full Code   Patient's Primary Decision Maker is: Named in Scanned ACP Document (new documents being completed)    Primary Decision Maker: Abdiaziz Arcos - Brother/Sister - [phone]    Discharge Planning:    Patient lives with: Spouse/Significant Other Type of Home: House (takes care of ex  who has Cancer and he Son lives there [place] Mon-Saturday)  Primary Care Giver: Self  Patient Support Systems include: Children, Family Members   Current Financial resources:    Current community resources:    Current services prior to admission: None            Current DME:              Type of Home Care services:  None    ADLS  Prior functional level: Independent in ADLs/IADLs  Current functional level:

## 2024-09-25 LAB
ALBUMIN SERPL-MCNC: 2.7 G/DL (ref 3.5–5.2)
ALP SERPL-CCNC: 45 U/L (ref 35–104)
ALT SERPL-CCNC: <5 U/L (ref 0–32)
ANION GAP SERPL CALCULATED.3IONS-SCNC: 9 MMOL/L (ref 7–16)
AST SERPL-CCNC: 7 U/L (ref 0–31)
BASOPHILS # BLD: 0.01 K/UL (ref 0–0.2)
BASOPHILS NFR BLD: 0 % (ref 0–2)
BILIRUB SERPL-MCNC: 0.3 MG/DL (ref 0–1.2)
BUN SERPL-MCNC: 9 MG/DL (ref 6–23)
CALCIUM SERPL-MCNC: 8.5 MG/DL (ref 8.6–10.2)
CARDIOLIPIN IGA SER IA-ACNC: 1.8 APL (ref 0–14)
CARDIOLIPIN IGG SER IA-ACNC: 6.1 GPL (ref 0–10)
CARDIOLIPIN IGM SER IA-ACNC: 3.6 MPL (ref 0–10)
CHLORIDE SERPL-SCNC: 107 MMOL/L (ref 98–107)
CO2 SERPL-SCNC: 20 MMOL/L (ref 22–29)
CREAT SERPL-MCNC: 0.8 MG/DL (ref 0.5–1)
EOSINOPHIL # BLD: 0.21 K/UL (ref 0.05–0.5)
EOSINOPHILS RELATIVE PERCENT: 4 % (ref 0–6)
ERYTHROCYTE [DISTWIDTH] IN BLOOD BY AUTOMATED COUNT: 13.9 % (ref 11.5–15)
GFR, ESTIMATED: 82 ML/MIN/1.73M2
GLUCOSE SERPL-MCNC: 94 MG/DL (ref 74–99)
HCT VFR BLD AUTO: 29.2 % (ref 34–48)
HGB BLD-MCNC: 9.5 G/DL (ref 11.5–15.5)
IMM GRANULOCYTES # BLD AUTO: <0.03 K/UL (ref 0–0.58)
IMM GRANULOCYTES NFR BLD: 0 % (ref 0–5)
INR PPP: 2
LYMPHOCYTES NFR BLD: 0.89 K/UL (ref 1.5–4)
LYMPHOCYTES RELATIVE PERCENT: 16 % (ref 20–42)
MAGNESIUM SERPL-MCNC: 1.9 MG/DL (ref 1.6–2.6)
MCH RBC QN AUTO: 28 PG (ref 26–35)
MCHC RBC AUTO-ENTMCNC: 32.5 G/DL (ref 32–34.5)
MCV RBC AUTO: 86.1 FL (ref 80–99.9)
MONOCYTES NFR BLD: 0.49 K/UL (ref 0.1–0.95)
MONOCYTES NFR BLD: 9 % (ref 2–12)
NEUTROPHILS NFR BLD: 70 % (ref 43–80)
NEUTS SEG NFR BLD: 3.81 K/UL (ref 1.8–7.3)
PHOSPHATE SERPL-MCNC: 4.7 MG/DL (ref 2.5–4.5)
PLATELET # BLD AUTO: 217 K/UL (ref 130–450)
PMV BLD AUTO: 8.8 FL (ref 7–12)
POTASSIUM SERPL-SCNC: 3.7 MMOL/L (ref 3.5–5)
PROT SERPL-MCNC: 5.4 G/DL (ref 6.4–8.3)
PROTHROMBIN TIME: 21.6 SEC (ref 9.3–12.4)
RBC # BLD AUTO: 3.39 M/UL (ref 3.5–5.5)
SODIUM SERPL-SCNC: 136 MMOL/L (ref 132–146)
WBC OTHER # BLD: 5.4 K/UL (ref 4.5–11.5)

## 2024-09-25 PROCEDURE — 6360000002 HC RX W HCPCS: Performed by: INTERNAL MEDICINE

## 2024-09-25 PROCEDURE — 6360000002 HC RX W HCPCS: Performed by: FAMILY MEDICINE

## 2024-09-25 PROCEDURE — 2060000000 HC ICU INTERMEDIATE R&B

## 2024-09-25 PROCEDURE — 6370000000 HC RX 637 (ALT 250 FOR IP): Performed by: INTERNAL MEDICINE

## 2024-09-25 PROCEDURE — 80053 COMPREHEN METABOLIC PANEL: CPT

## 2024-09-25 PROCEDURE — 6370000000 HC RX 637 (ALT 250 FOR IP): Performed by: FAMILY MEDICINE

## 2024-09-25 PROCEDURE — 99233 SBSQ HOSP IP/OBS HIGH 50: CPT | Performed by: INTERNAL MEDICINE

## 2024-09-25 PROCEDURE — 85610 PROTHROMBIN TIME: CPT

## 2024-09-25 PROCEDURE — 36415 COLL VENOUS BLD VENIPUNCTURE: CPT

## 2024-09-25 PROCEDURE — 97530 THERAPEUTIC ACTIVITIES: CPT | Performed by: PHYSICAL THERAPIST

## 2024-09-25 PROCEDURE — 2580000003 HC RX 258: Performed by: FAMILY MEDICINE

## 2024-09-25 PROCEDURE — 85025 COMPLETE CBC W/AUTO DIFF WBC: CPT

## 2024-09-25 PROCEDURE — 2580000003 HC RX 258: Performed by: INTERNAL MEDICINE

## 2024-09-25 PROCEDURE — 97161 PT EVAL LOW COMPLEX 20 MIN: CPT | Performed by: PHYSICAL THERAPIST

## 2024-09-25 PROCEDURE — 84100 ASSAY OF PHOSPHORUS: CPT

## 2024-09-25 PROCEDURE — 97165 OT EVAL LOW COMPLEX 30 MIN: CPT | Performed by: OCCUPATIONAL THERAPIST

## 2024-09-25 PROCEDURE — 83735 ASSAY OF MAGNESIUM: CPT

## 2024-09-25 PROCEDURE — 94640 AIRWAY INHALATION TREATMENT: CPT

## 2024-09-25 PROCEDURE — 99232 SBSQ HOSP IP/OBS MODERATE 35: CPT | Performed by: FAMILY MEDICINE

## 2024-09-25 PROCEDURE — 97530 THERAPEUTIC ACTIVITIES: CPT | Performed by: OCCUPATIONAL THERAPIST

## 2024-09-25 RX ADMIN — TOPIRAMATE 50 MG: 25 TABLET, FILM COATED ORAL at 08:32

## 2024-09-25 RX ADMIN — ALPRAZOLAM 0.5 MG: 0.5 TABLET ORAL at 11:02

## 2024-09-25 RX ADMIN — IPRATROPIUM BROMIDE AND ALBUTEROL SULFATE 1 DOSE: 2.5; .5 SOLUTION RESPIRATORY (INHALATION) at 05:18

## 2024-09-25 RX ADMIN — FENTANYL CITRATE 50 MCG: 0.05 INJECTION, SOLUTION INTRAMUSCULAR; INTRAVENOUS at 05:57

## 2024-09-25 RX ADMIN — FENTANYL CITRATE 50 MCG: 0.05 INJECTION, SOLUTION INTRAMUSCULAR; INTRAVENOUS at 18:23

## 2024-09-25 RX ADMIN — APIXABAN 10 MG: 5 TABLET, FILM COATED ORAL at 20:47

## 2024-09-25 RX ADMIN — FENTANYL CITRATE 50 MCG: 0.05 INJECTION, SOLUTION INTRAMUSCULAR; INTRAVENOUS at 11:08

## 2024-09-25 RX ADMIN — BUDESONIDE 500 MCG: 0.5 SUSPENSION RESPIRATORY (INHALATION) at 18:30

## 2024-09-25 RX ADMIN — SODIUM CHLORIDE, PRESERVATIVE FREE 40 MG: 5 INJECTION INTRAVENOUS at 08:35

## 2024-09-25 RX ADMIN — BUDESONIDE 500 MCG: 0.5 SUSPENSION RESPIRATORY (INHALATION) at 05:18

## 2024-09-25 RX ADMIN — Medication 10 ML: at 20:49

## 2024-09-25 RX ADMIN — FENTANYL CITRATE 50 MCG: 0.05 INJECTION, SOLUTION INTRAMUSCULAR; INTRAVENOUS at 20:47

## 2024-09-25 RX ADMIN — QUETIAPINE 300 MG: 100 TABLET ORAL at 20:47

## 2024-09-25 RX ADMIN — SODIUM CHLORIDE, PRESERVATIVE FREE 40 MG: 5 INJECTION INTRAVENOUS at 20:47

## 2024-09-25 RX ADMIN — FOLIC ACID 1 MG: 1 TABLET ORAL at 08:31

## 2024-09-25 RX ADMIN — VENLAFAXINE HYDROCHLORIDE 150 MG: 75 CAPSULE, EXTENDED RELEASE ORAL at 08:33

## 2024-09-25 RX ADMIN — CLOPIDOGREL BISULFATE 75 MG: 75 TABLET ORAL at 08:32

## 2024-09-25 RX ADMIN — TOPIRAMATE 50 MG: 25 TABLET, FILM COATED ORAL at 20:47

## 2024-09-25 RX ADMIN — IPRATROPIUM BROMIDE AND ALBUTEROL SULFATE 1 DOSE: 2.5; .5 SOLUTION RESPIRATORY (INHALATION) at 09:42

## 2024-09-25 RX ADMIN — AZITHROMYCIN DIHYDRATE 500 MG: 500 INJECTION, POWDER, LYOPHILIZED, FOR SOLUTION INTRAVENOUS at 08:52

## 2024-09-25 RX ADMIN — ARFORMOTEROL TARTRATE 15 MCG: 15 SOLUTION RESPIRATORY (INHALATION) at 05:18

## 2024-09-25 RX ADMIN — ATORVASTATIN CALCIUM 40 MG: 40 TABLET, FILM COATED ORAL at 20:47

## 2024-09-25 RX ADMIN — ONDANSETRON 4 MG: 2 INJECTION INTRAMUSCULAR; INTRAVENOUS at 18:23

## 2024-09-25 RX ADMIN — Medication 10 ML: at 08:33

## 2024-09-25 RX ADMIN — FENTANYL CITRATE 50 MCG: 0.05 INJECTION, SOLUTION INTRAMUSCULAR; INTRAVENOUS at 03:19

## 2024-09-25 RX ADMIN — ARFORMOTEROL TARTRATE 15 MCG: 15 SOLUTION RESPIRATORY (INHALATION) at 18:30

## 2024-09-25 RX ADMIN — TRAZODONE HYDROCHLORIDE 50 MG: 50 TABLET ORAL at 20:47

## 2024-09-25 RX ADMIN — MORPHINE SULFATE 15 MG: 15 TABLET, FILM COATED, EXTENDED RELEASE ORAL at 10:14

## 2024-09-25 RX ADMIN — FENTANYL CITRATE 50 MCG: 0.05 INJECTION, SOLUTION INTRAMUSCULAR; INTRAVENOUS at 08:46

## 2024-09-25 RX ADMIN — IPRATROPIUM BROMIDE AND ALBUTEROL SULFATE 1 DOSE: 2.5; .5 SOLUTION RESPIRATORY (INHALATION) at 14:31

## 2024-09-25 RX ADMIN — APIXABAN 10 MG: 5 TABLET, FILM COATED ORAL at 08:32

## 2024-09-25 RX ADMIN — MORPHINE SULFATE 15 MG: 15 TABLET, FILM COATED, EXTENDED RELEASE ORAL at 22:35

## 2024-09-25 RX ADMIN — FENTANYL CITRATE 50 MCG: 0.05 INJECTION, SOLUTION INTRAMUSCULAR; INTRAVENOUS at 14:52

## 2024-09-25 RX ADMIN — ASPIRIN 81 MG CHEWABLE TABLET 81 MG: 81 TABLET CHEWABLE at 08:32

## 2024-09-25 RX ADMIN — ALPRAZOLAM 0.5 MG: 0.5 TABLET ORAL at 22:35

## 2024-09-25 RX ADMIN — IPRATROPIUM BROMIDE AND ALBUTEROL SULFATE 1 DOSE: 2.5; .5 SOLUTION RESPIRATORY (INHALATION) at 18:29

## 2024-09-25 ASSESSMENT — PAIN DESCRIPTION - ORIENTATION
ORIENTATION: RIGHT

## 2024-09-25 ASSESSMENT — PAIN SCALES - GENERAL
PAINLEVEL_OUTOF10: 7
PAINLEVEL_OUTOF10: 8
PAINLEVEL_OUTOF10: 7
PAINLEVEL_OUTOF10: 7
PAINLEVEL_OUTOF10: 6
PAINLEVEL_OUTOF10: 5
PAINLEVEL_OUTOF10: 6
PAINLEVEL_OUTOF10: 4
PAINLEVEL_OUTOF10: 8
PAINLEVEL_OUTOF10: 5
PAINLEVEL_OUTOF10: 7
PAINLEVEL_OUTOF10: 8
PAINLEVEL_OUTOF10: 6
PAINLEVEL_OUTOF10: 8

## 2024-09-25 ASSESSMENT — PAIN DESCRIPTION - DESCRIPTORS
DESCRIPTORS: SHARP
DESCRIPTORS: STABBING

## 2024-09-25 ASSESSMENT — PAIN DESCRIPTION - LOCATION
LOCATION: RIB CAGE

## 2024-09-25 ASSESSMENT — PAIN - FUNCTIONAL ASSESSMENT
PAIN_FUNCTIONAL_ASSESSMENT: PREVENTS OR INTERFERES SOME ACTIVE ACTIVITIES AND ADLS
PAIN_FUNCTIONAL_ASSESSMENT: ACTIVITIES ARE NOT PREVENTED

## 2024-09-25 NOTE — PROGRESS NOTES
Pulmonary/Critical Care Progress Note    We are following patient for bilateral pulmonary emboli with moderate clot burden, COPD exacerbation, question pneumonia, extensive right leg clot ranging from iliac vein to popliteal vein, grade 1 diastolic dysfunction    SUBJECTIVE:  The patient was transferred to the monitored floor yesterday and has done well since.  She is feeling considerably better now that her COPD exacerbation is coming under control and her pulmonary embolism is being treated with direct oral anticoagulants (factor Xa inhibitor).    She can be ambulatory now but I would continue to monitor her until Friday at which point she can probably be safely discharged.    The patient has been counseled on cigarette smoking and knows that she cannot do this anymore.    She remains on aspirin and clopidogrel and has not had any evidence at this point of any bleeding despite her anticoagulation.    The patient is pleuritic chest pain is much better and that her pulmonary infarction is improving.    MEDICATIONS:   QUEtiapine  300 mg Oral Nightly    apixaban  10 mg Oral BID    Followed by    [START ON 10/1/2024] apixaban  5 mg Oral BID    lidocaine  1 patch TransDERmal Daily    budesonide  0.5 mg Nebulization 2 times per day    azithromycin  500 mg IntraVENous Q24H    ipratropium 0.5 mg-albuterol 2.5 mg  1 Dose Inhalation 4x Daily RT    pantoprazole (PROTONIX) 40 mg in sodium chloride (PF) 0.9 % 10 mL injection  40 mg IntraVENous Q12H    aspirin  81 mg Oral Daily    clopidogrel  75 mg Oral Daily    sodium chloride flush  5-40 mL IntraVENous 2 times per day    atorvastatin  40 mg Oral Nightly    folic acid  1 mg Oral Daily    morphine  15 mg Oral Q12H    nicotine  1 patch TransDERmal Daily    topiramate  50 mg Oral BID    traZODone  50 mg Oral Nightly    venlafaxine  150 mg Oral Daily    vitamin D  50,000 Units Oral Once per day on Monday Thursday    arformoterol tartrate  15 mcg Nebulization BID RT      sodium  to any procedures I billed separately is not included.    My time caring for this patient including history, physical examination, and medical management and evaluation added up to greater than 50% of the total time spent with this patient, including shared/split visits, should this note reflect this event.          Electronically signed by Shantanu Encarnacion MD on 9/25/2024 at 3:42 PM

## 2024-09-25 NOTE — PROGRESS NOTES
[facility] Hospitalist Progress Note    Admitting Date and Time: 9/22/2024  1:18 PM  Admit Dx: Shortness of breath [R06.02]  Shock circulatory (HCC) [R57.9]  Acute deep vein thrombosis (DVT) of other specified vein of right lower extremity (HCC) [I82.491]  Chest pain, unspecified type [R07.9]  Chronic congestive heart failure, unspecified heart failure type (HCC) [I50.9]  Pulmonary embolism without acute cor pulmonale, unspecified chronicity, unspecified pulmonary embolism type (HCC) [I26.99]      Assessment:    Principal Problem:    Shock circulatory (HCC)  Active Problems:    Atherosclerosis of native coronary artery of native heart without angina pectoris    Chronic obstructive pulmonary disease (HCC)    Hypertension    CHF (congestive heart failure) (HCC)    Hyperlipidemia    Chronic pain    DJD (degenerative joint disease)    Hx of degenerative disc disease    History of MI (myocardial infarction)    Gastroparesis    Anxiety    History of CVA (cerebrovascular accident)    Pulmonary embolism without acute cor pulmonale (HCC)    Deep venous thrombosis (HCC)    Chest pain    Shortness of breath  Resolved Problems:    * No resolved hospital problems. *      Plan:  9/22/2024  Circulatory shock  PE  DVT  - critical care c/s  - IVF  - heparin gtt started in ED  - hold home BP meds  - symptom management     Panlobular emphysema  Tobacco use  - nicotine patch  - Duonebs  - brovana/pulmicort     DJD, DDD, polyarthralgia  Chronic pain  Chronic narcotics  - continue home MS contin  - senokot     CAD on plavix/ASA  HTN  HLD  CHF  - echo  - hold BP meds due to hypotension  - on heparin gtt, has petechiae and platelets dropping, hold asa/plavix     9/23/2024  Heparin gtt  --pt to icu per pulm/cc  --control pain  --echo ordered and pending, but no signs of heart strain on imaging thus far or on EKG  --monitor aptt  --transition to po anticoagulation at [place], likely lifelong anticoag  --bronchodilators via

## 2024-09-25 NOTE — PLAN OF CARE
Problem: Safety - Adult  Goal: Free from fall injury  Outcome: Progressing     Problem: Discharge Planning  Goal: Discharge to home or other facility with appropriate resources  Outcome: Progressing     Problem: Pain  Goal: Verbalizes/displays adequate comfort level or baseline comfort level  Outcome: Progressing     Problem: Skin/Tissue Integrity  Goal: Absence of new skin breakdown  Description: 1.  Monitor for areas of redness and/or skin breakdown  2.  Assess vascular access sites hourly  3.  Every 4-6 hours minimum:  Change oxygen saturation probe site  4.  Every 4-6 hours:  If on nasal continuous positive airway pressure, respiratory therapy assess nares and determine need for appliance change or resting period.  Outcome: Progressing     Problem: ABCDS Injury Assessment  Goal: Absence of physical injury  Outcome: Progressing     Problem: Respiratory - Adult  Goal: Achieves optimal ventilation and oxygenation  Outcome: Progressing     Problem: Cardiovascular - Adult  Goal: Maintains optimal cardiac output and hemodynamic stability  Outcome: Progressing  Goal: Absence of cardiac dysrhythmias or at baseline  Outcome: Progressing     Problem: Skin/Tissue Integrity - Adult  Goal: Skin integrity remains intact  Outcome: Progressing     Problem: Musculoskeletal - Adult  Goal: Return mobility to safest level of function  Outcome: Progressing     Problem: Hematologic - Adult  Goal: Maintains hematologic stability  Outcome: Progressing     Problem: Chronic Conditions and Co-morbidities  Goal: Patient's chronic conditions and co-morbidity symptoms are monitored and maintained or improved  Outcome: Progressing

## 2024-09-25 NOTE — PLAN OF CARE
Problem: Safety - Adult  Goal: Free from fall injury  9/25/2024 0050 by Sami Zee RN  Outcome: Progressing     Problem: Discharge Planning  Goal: Discharge to home or other facility with appropriate resources  9/25/2024 0050 by Sami Zee RN  Outcome: Progressing     Problem: Pain  Goal: Verbalizes/displays adequate comfort level or baseline comfort level  9/25/2024 0050 by Sami Zee RN  Outcome: Progressing     Problem: Skin/Tissue Integrity  Goal: Absence of new skin breakdown  Description: 1.  Monitor for areas of redness and/or skin breakdown  2.  Assess vascular access sites hourly  3.  Every 4-6 hours minimum:  Change oxygen saturation probe site  4.  Every 4-6 hours:  If on nasal continuous positive airway pressure, respiratory therapy assess nares and determine need for appliance change or resting period.  9/25/2024 0050 by Sami Zee RN  Outcome: Progressing     Problem: ABCDS Injury Assessment  Goal: Absence of physical injury  9/25/2024 0050 by Sami Zee RN  Outcome: Progressing     Problem: Respiratory - Adult  Goal: Achieves optimal ventilation and oxygenation  9/25/2024 0050 by Sami Zee RN  Outcome: Progressing     Problem: Cardiovascular - Adult  Goal: Maintains optimal cardiac output and hemodynamic stability  9/25/2024 0050 by Sami Zee RN  Outcome: Progressing     Problem: Cardiovascular - Adult  Goal: Absence of cardiac dysrhythmias or at baseline  9/25/2024 0050 by Sami Zee RN  Outcome: Progressing     Problem: Skin/Tissue Integrity - Adult  Goal: Skin integrity remains intact  9/25/2024 0050 by Sami Zee RN  Outcome: Progressing     Problem: Musculoskeletal - Adult  Goal: Return mobility to safest level of function  9/25/2024 0050 by Sami Zee RN  Outcome: Progressing     Problem: Hematologic - Adult  Goal: Maintains hematologic stability  9/25/2024 0050 by Sami Zee RN  Outcome: Progressing

## 2024-09-25 NOTE — PROGRESS NOTES
Physician Progress Note      PATIENT:               LEXA SCOTT  [facility] #:                  [id number]  :                       1960  ADMIT DATE:       2024 1:18 PM  DISCH DATE:  RESPONDING  PROVIDER #:        Tamara Medina DO          QUERY TEXT:    Pt admitted with DVT and has CHF documented. If possible, please document in   progress notes and discharge summary further specificity regarding the type   and acuity of CHF:    The medical record reflects the following:  Risk Factors: HTN, Hx of MI, CAD  Clinical Indicators: H&P and Progress Notes - \"...CHF...\", Critical   Care Progress Note  \"...CHF (congestive heart failure)...\", Echocardiogram    \"...Left Ventricle: Normal left ventricular systolic function with EF of   65 - 70%. Left ventricle size is normal. Normal wall thickness. Normal wall   motion. Grade I diastolic dysfunction...Right Ventricle: Normal systolic   function...\"  Treatment: Labs, Echocardiogram    Thank you,  YOSI Martinez, RN, CRCR  Clinical Documentation Integrity  [phone]  Options provided:  -- Chronic Diastolic CHF/HFpEF  -- Other - I will add my own diagnosis  -- Disagree - Not applicable / Not valid  -- Disagree - Clinically unable to determine / Unknown  -- Refer to Clinical Documentation Reviewer    PROVIDER RESPONSE TEXT:    This patient has chronic diastolic CHF/HFpEF.    Query created by: Nish Scruggs on 2024 9:54 AM      Electronically signed by:  Tamara Medina DO 2024 12:46 PM

## 2024-09-25 NOTE — PROGRESS NOTES
Static:  good    Dynamic:good  Standing: fair+    Standing: good   Activity Tolerance Vitals with activity:room air with signs of SOB and fair- tolerance with ADLs, but keeping between 93-98% HR   Standing tolerance 2min  Increase standing tolerance for >4min with stable vital signs for carry over into toileting, functional tranfers and indep in ADLs   Visual/  Perceptual Glasses: none; WFL    Reports change in vision since admission: No     NA     Hand Dominance  [x] Right  [] Left    AROM (PROM) Strength Additional Info:  Goal:   RUE  WFL 4/5 good  and  FMC/dexterity noted during ADL tasks  Opposition [x] Intact [] Impaired  Finger to nose [x] Intact [] Impaired 5/5MMT generally for carry over into self care, functional transfers and functional mobility with AD.    LUE WFL 4/5 good  and  FMC/dexterity noted during ADL tasks  Opposition [x] Intact [] Impaired  Finger to nose [x] Intact [] Impaired 5/5MMT generally for carry over into self care, functional transfers and functional mobility with AD.      Hearing: WFL   Sensation:  No c/o numbness or tingling   Tone: WFL   Edema: min in bilateral LE    Comments: Upon arrival patient supine and sleeping. The biggest barriers reflect that of functional transfers, functional mobility, UB/LB ADLs, activity tolerance, balance, safety and strengthening. At end of session, patient supine with call light and phone within reach, all lines and tubes intact.  Overall patient demonstrated decreased independence and safety during completion of ADL/functional transfer/mobility tasks compared to PLOF. Nursing updated on pt position and status following OT eval. Pt would benefit from continued skilled OT to increase safety and independence with completion of ADL/IADL tasks for functional independence and quality of life.    Treatment: OT treatment provided this date includes:  Instruction, education and training on safe facilitation and adapted techniques for completion  of ADLs. These include neuromuscular reeducation to facilitate balance/righting reactions,safe functional transfer techniques, proper positioning/alignment to improve interaction with environment and overall function and on adapted techniques/work simplification for completion of ADLs. Education provided on hand/feet placement with bed rails, arm chair and body mechanics for fall prevention. Cues for energy conservation and safety for DC, including modifications and DME. Extended time to complete all tasks, including skilled monitoring of patient's response during treatment session and vital signs. Prior to and at the end of session, environmental modifications / line management completed for patients safety and efficiency of treatment session. See above for further details.    Rehab Potential: Good for established goals     Patient / Family Goal: Pain management      Patient and/or family were instructed on functional diagnosis, prognosis/goals and OT plan of care. Demonstrated good understanding.     Eval Complexity: Low  History: Brief review of medical records and additional review of physical, cognitive, or psychosocial history related to current functional performance  Exam: 3+ performance deficits  Assistance/Modification: Mod assistance or modifications required to perform tasks. May have comorbidities that affect occupational performance.    Time In: 1327  Time Out: 1352  Total Treatment Time: 10    Min Units   OT Eval Low 97165  x  1   OT Eval Medium 83458      OT Eval High 56169      OT Re-Eval 68426       Therapeutic Ex 15400       Therapeutic Activities 25992  10 1    ADL/Self Care 76528       Orthotic Management 42514       Manual 41924     Neuro Re-Ed 13052       Non-Billable Time          Evaluation Time additionally includes thorough review of current medical information, gathering information on past medical history/social history and prior level of function, interpretation of standardized

## 2024-09-25 NOTE — PROGRESS NOTES
Physical Therapy Initial Evaluation/Plan of Care    Room #:  0636/0636-01  Patient Name: Niru Chaves  YOB: 1960  MRN: [id number]    Date of Service: 9/25/2024     Tentative placement recommendation: Home with no Physical Therapy needs  Equipment recommendation: None      Evaluating Physical Therapist: Ledy Back, PT #[id number]      Specific Provider Orders/Date/Referring Provider :  09/25/24 1100    PT eval and treat  Start:  09/25/24 1100,   End:  09/25/24 1100,   ONE TIME,   Standing Count:  1 Occurrences,   R       Tamara Medina DO    Admitting Diagnosis:   Shortness of breath [R06.02]  Shock circulatory (HCC) [R57.9]  Acute deep vein thrombosis (DVT) of other specified vein of right lower extremity (HCC) [I82.491]  Chest pain, unspecified type [R07.9]  Chronic congestive heart failure, unspecified heart failure type (HCC) [I50.9]  Pulmonary embolism without acute cor pulmonale, unspecified chronicity, unspecified pulmonary embolism type (HCC) [I26.99]     pain and swelling in her right lower extremity.   Surgery: none      Patient Active Problem List   Diagnosis    Shock circulatory (HCC)    Atherosclerosis of native coronary artery of native heart without angina pectoris    Chronic obstructive pulmonary disease (HCC)    Hypertension    CHF (congestive heart failure) (HCC)    Hyperlipidemia    Chronic pain    DJD (degenerative joint disease)    Hx of degenerative disc disease    History of MI (myocardial infarction)    Gastroparesis    Anxiety    History of CVA (cerebrovascular accident)    Pulmonary embolism without acute cor pulmonale (HCC)    Deep venous thrombosis (HCC)    Chest pain    Shortness of breath        ASSESSMENT of Current Deficits Patient exhibits decreased strength, balance, and endurance impairing functional mobility, transfers, gait , gait distance, and tolerance to activity antalgic gait Right lower extremity, increased shortness of breath maintains O2 saturation.   Rate during session 102   SPO2 at rest 97%  SPO2 during session 97%     Patient education  Patient educated on role of Physical Therapy, risks of immobility, safety and plan of care and  importance of mobility while in hospital      Patient response to education:   Pt verbalized understanding Pt demonstrated skill Pt requires further education in this area   Yes Partial Yes      Treatment:  Patient practiced and was instructed/facilitated in the following treatment: Patient assisted to edge of bed,   Sat edge of bed 10 minutes with Supervision  to increase dynamic sitting balance and activity tolerance. ambulated in hallway, returned to bed Right lower extremity elevated pillow      Therapeutic Exercises:  ankle pumps  x 10 reps.     15  At end of session, patient in bed with     call light and phone within reach,  all lines and tubes intact, nursing notified.      Patient would benefit from continued skilled Physical Therapy to improve functional independence and quality of life.         Patient's/ family goals   home    Time in  1402  Time out  1432    Total Treatment Time  10 minutes    Evaluation time includes thorough review of current medical information, gathering information on past medical history/social history and prior level of function, completion of standardized testing/informal observation of tasks, assessment of data, and development of Plan of care and goals.     CPT codes:  Low Complexity PT evaluation (98043)  Therapeutic activities (62793)   10 minutes  1 unit(s)    Ledy Back, PT

## 2024-09-25 NOTE — CARE COORDINATION
SOCIAL WORK / DISCHARGE PLANNING:  Sw met with pt at bedside. Dc plan to the home of ex spouse in [place] as she assists him. Pt does not have anyone to provide transportation nor will her insurance provide assistance. Taxi voucher completed with address in soft chart. [employer] Eliquis, 30 DAY FREE TRIAL COUPON PROVIDED by Mireya OWENS. Pt completed [facility] [employer] Reservations, copies in soft chart, naming her brother, Abdiaziz. Pt denies any other dc needs. PT/OT evals ordered, supervision noted for transfers/ambulation. Room air. Chronic pain meds at home. Sw will follow.

## 2024-09-26 ENCOUNTER — APPOINTMENT (OUTPATIENT)
Dept: GENERAL RADIOLOGY | Age: 64
End: 2024-09-26
Payer: COMMERCIAL

## 2024-09-26 LAB
ALBUMIN SERPL-MCNC: 2.9 G/DL (ref 3.5–5.2)
ALP SERPL-CCNC: 54 U/L (ref 35–104)
ALT SERPL-CCNC: 5 U/L (ref 0–32)
ANION GAP SERPL CALCULATED.3IONS-SCNC: 11 MMOL/L (ref 7–16)
AST SERPL-CCNC: 9 U/L (ref 0–31)
BASOPHILS # BLD: 0 K/UL (ref 0–0.2)
BASOPHILS NFR BLD: 0 % (ref 0–2)
BILIRUB SERPL-MCNC: 0.3 MG/DL (ref 0–1.2)
BUN SERPL-MCNC: 12 MG/DL (ref 6–23)
CALCIUM SERPL-MCNC: 8.6 MG/DL (ref 8.6–10.2)
CHLORIDE SERPL-SCNC: 106 MMOL/L (ref 98–107)
CO2 SERPL-SCNC: 21 MMOL/L (ref 22–29)
CREAT SERPL-MCNC: 1 MG/DL (ref 0.5–1)
EOSINOPHIL # BLD: 0.25 K/UL (ref 0.05–0.5)
EOSINOPHILS RELATIVE PERCENT: 5 % (ref 0–6)
ERYTHROCYTE [DISTWIDTH] IN BLOOD BY AUTOMATED COUNT: 14.1 % (ref 11.5–15)
GFR, ESTIMATED: 67 ML/MIN/1.73M2
GLUCOSE SERPL-MCNC: 99 MG/DL (ref 74–99)
HCT VFR BLD AUTO: 31.1 % (ref 34–48)
HGB BLD-MCNC: 10.3 G/DL (ref 11.5–15.5)
IMM GRANULOCYTES # BLD AUTO: <0.03 K/UL (ref 0–0.58)
IMM GRANULOCYTES NFR BLD: 0 % (ref 0–5)
LYMPHOCYTES NFR BLD: 0.73 K/UL (ref 1.5–4)
LYMPHOCYTES RELATIVE PERCENT: 16 % (ref 20–42)
MAGNESIUM SERPL-MCNC: 2.1 MG/DL (ref 1.6–2.6)
MCH RBC QN AUTO: 28.2 PG (ref 26–35)
MCHC RBC AUTO-ENTMCNC: 33.1 G/DL (ref 32–34.5)
MCV RBC AUTO: 85.2 FL (ref 80–99.9)
MONOCYTES NFR BLD: 0.41 K/UL (ref 0.1–0.95)
MONOCYTES NFR BLD: 9 % (ref 2–12)
NEUTROPHILS NFR BLD: 69 % (ref 43–80)
NEUTS SEG NFR BLD: 3.18 K/UL (ref 1.8–7.3)
PHOSPHATE SERPL-MCNC: 4.9 MG/DL (ref 2.5–4.5)
PLATELET # BLD AUTO: 264 K/UL (ref 130–450)
PMV BLD AUTO: 8.8 FL (ref 7–12)
POTASSIUM SERPL-SCNC: 3.8 MMOL/L (ref 3.5–5)
PROT SERPL-MCNC: 5.8 G/DL (ref 6.4–8.3)
RBC # BLD AUTO: 3.65 M/UL (ref 3.5–5.5)
SODIUM SERPL-SCNC: 138 MMOL/L (ref 132–146)
WBC OTHER # BLD: 4.6 K/UL (ref 4.5–11.5)

## 2024-09-26 PROCEDURE — 6370000000 HC RX 637 (ALT 250 FOR IP): Performed by: INTERNAL MEDICINE

## 2024-09-26 PROCEDURE — 36415 COLL VENOUS BLD VENIPUNCTURE: CPT

## 2024-09-26 PROCEDURE — 84100 ASSAY OF PHOSPHORUS: CPT

## 2024-09-26 PROCEDURE — 83735 ASSAY OF MAGNESIUM: CPT

## 2024-09-26 PROCEDURE — 2060000000 HC ICU INTERMEDIATE R&B

## 2024-09-26 PROCEDURE — 85025 COMPLETE CBC W/AUTO DIFF WBC: CPT

## 2024-09-26 PROCEDURE — 2580000003 HC RX 258: Performed by: FAMILY MEDICINE

## 2024-09-26 PROCEDURE — 71045 X-RAY EXAM CHEST 1 VIEW: CPT

## 2024-09-26 PROCEDURE — 6360000002 HC RX W HCPCS: Performed by: FAMILY MEDICINE

## 2024-09-26 PROCEDURE — 6360000002 HC RX W HCPCS: Performed by: INTERNAL MEDICINE

## 2024-09-26 PROCEDURE — 2580000003 HC RX 258: Performed by: INTERNAL MEDICINE

## 2024-09-26 PROCEDURE — 94640 AIRWAY INHALATION TREATMENT: CPT

## 2024-09-26 PROCEDURE — 97530 THERAPEUTIC ACTIVITIES: CPT

## 2024-09-26 PROCEDURE — 6370000000 HC RX 637 (ALT 250 FOR IP): Performed by: FAMILY MEDICINE

## 2024-09-26 PROCEDURE — 99232 SBSQ HOSP IP/OBS MODERATE 35: CPT | Performed by: FAMILY MEDICINE

## 2024-09-26 PROCEDURE — 80053 COMPREHEN METABOLIC PANEL: CPT

## 2024-09-26 RX ORDER — DIPHENHYDRAMINE HCL 25 MG
25 TABLET ORAL ONCE
Status: COMPLETED | OUTPATIENT
Start: 2024-09-26 | End: 2024-09-26

## 2024-09-26 RX ADMIN — ATORVASTATIN CALCIUM 40 MG: 40 TABLET, FILM COATED ORAL at 19:55

## 2024-09-26 RX ADMIN — FENTANYL CITRATE 50 MCG: 0.05 INJECTION, SOLUTION INTRAMUSCULAR; INTRAVENOUS at 19:56

## 2024-09-26 RX ADMIN — ASPIRIN 81 MG CHEWABLE TABLET 81 MG: 81 TABLET CHEWABLE at 09:02

## 2024-09-26 RX ADMIN — TRAZODONE HYDROCHLORIDE 50 MG: 50 TABLET ORAL at 19:55

## 2024-09-26 RX ADMIN — ALPRAZOLAM 0.5 MG: 0.5 TABLET ORAL at 22:35

## 2024-09-26 RX ADMIN — QUETIAPINE 300 MG: 100 TABLET ORAL at 19:55

## 2024-09-26 RX ADMIN — SODIUM CHLORIDE, PRESERVATIVE FREE 40 MG: 5 INJECTION INTRAVENOUS at 19:55

## 2024-09-26 RX ADMIN — Medication 10 ML: at 21:00

## 2024-09-26 RX ADMIN — BUDESONIDE 500 MCG: 0.5 SUSPENSION RESPIRATORY (INHALATION) at 06:18

## 2024-09-26 RX ADMIN — TOPIRAMATE 50 MG: 25 TABLET, FILM COATED ORAL at 19:56

## 2024-09-26 RX ADMIN — AZITHROMYCIN DIHYDRATE 500 MG: 500 INJECTION, POWDER, LYOPHILIZED, FOR SOLUTION INTRAVENOUS at 09:22

## 2024-09-26 RX ADMIN — IPRATROPIUM BROMIDE AND ALBUTEROL SULFATE 1 DOSE: 2.5; .5 SOLUTION RESPIRATORY (INHALATION) at 18:32

## 2024-09-26 RX ADMIN — IPRATROPIUM BROMIDE AND ALBUTEROL SULFATE 1 DOSE: 2.5; .5 SOLUTION RESPIRATORY (INHALATION) at 06:18

## 2024-09-26 RX ADMIN — ARFORMOTEROL TARTRATE 15 MCG: 15 SOLUTION RESPIRATORY (INHALATION) at 06:17

## 2024-09-26 RX ADMIN — CLOPIDOGREL BISULFATE 75 MG: 75 TABLET ORAL at 09:02

## 2024-09-26 RX ADMIN — FENTANYL CITRATE 50 MCG: 0.05 INJECTION, SOLUTION INTRAMUSCULAR; INTRAVENOUS at 17:21

## 2024-09-26 RX ADMIN — FENTANYL CITRATE 50 MCG: 0.05 INJECTION, SOLUTION INTRAMUSCULAR; INTRAVENOUS at 02:35

## 2024-09-26 RX ADMIN — VENLAFAXINE HYDROCHLORIDE 150 MG: 75 CAPSULE, EXTENDED RELEASE ORAL at 09:02

## 2024-09-26 RX ADMIN — Medication 10 ML: at 09:05

## 2024-09-26 RX ADMIN — FENTANYL CITRATE 50 MCG: 0.05 INJECTION, SOLUTION INTRAMUSCULAR; INTRAVENOUS at 14:17

## 2024-09-26 RX ADMIN — FENTANYL CITRATE 50 MCG: 0.05 INJECTION, SOLUTION INTRAMUSCULAR; INTRAVENOUS at 10:12

## 2024-09-26 RX ADMIN — IPRATROPIUM BROMIDE AND ALBUTEROL SULFATE 1 DOSE: 2.5; .5 SOLUTION RESPIRATORY (INHALATION) at 09:11

## 2024-09-26 RX ADMIN — SODIUM CHLORIDE, PRESERVATIVE FREE 40 MG: 5 INJECTION INTRAVENOUS at 09:02

## 2024-09-26 RX ADMIN — ERGOCALCIFEROL 50000 UNITS: 1.25 CAPSULE ORAL at 09:02

## 2024-09-26 RX ADMIN — FENTANYL CITRATE 50 MCG: 0.05 INJECTION, SOLUTION INTRAMUSCULAR; INTRAVENOUS at 05:22

## 2024-09-26 RX ADMIN — ALPRAZOLAM 0.5 MG: 0.5 TABLET ORAL at 09:12

## 2024-09-26 RX ADMIN — APIXABAN 10 MG: 5 TABLET, FILM COATED ORAL at 19:56

## 2024-09-26 RX ADMIN — IPRATROPIUM BROMIDE AND ALBUTEROL SULFATE 1 DOSE: 2.5; .5 SOLUTION RESPIRATORY (INHALATION) at 14:51

## 2024-09-26 RX ADMIN — ARFORMOTEROL TARTRATE 15 MCG: 15 SOLUTION RESPIRATORY (INHALATION) at 18:32

## 2024-09-26 RX ADMIN — MORPHINE SULFATE 15 MG: 15 TABLET, FILM COATED, EXTENDED RELEASE ORAL at 09:01

## 2024-09-26 RX ADMIN — APIXABAN 10 MG: 5 TABLET, FILM COATED ORAL at 09:01

## 2024-09-26 RX ADMIN — BUDESONIDE 500 MCG: 0.5 SUSPENSION RESPIRATORY (INHALATION) at 18:32

## 2024-09-26 RX ADMIN — DIPHENHYDRAMINE HYDROCHLORIDE 25 MG: 25 TABLET ORAL at 14:41

## 2024-09-26 RX ADMIN — MORPHINE SULFATE 15 MG: 15 TABLET, FILM COATED, EXTENDED RELEASE ORAL at 22:35

## 2024-09-26 RX ADMIN — TOPIRAMATE 50 MG: 25 TABLET, FILM COATED ORAL at 09:01

## 2024-09-26 RX ADMIN — FENTANYL CITRATE 50 MCG: 0.05 INJECTION, SOLUTION INTRAMUSCULAR; INTRAVENOUS at 12:35

## 2024-09-26 RX ADMIN — FOLIC ACID 1 MG: 1 TABLET ORAL at 09:02

## 2024-09-26 ASSESSMENT — PAIN - FUNCTIONAL ASSESSMENT
PAIN_FUNCTIONAL_ASSESSMENT: PREVENTS OR INTERFERES SOME ACTIVE ACTIVITIES AND ADLS

## 2024-09-26 ASSESSMENT — PAIN DESCRIPTION - ORIENTATION
ORIENTATION: RIGHT

## 2024-09-26 ASSESSMENT — PAIN DESCRIPTION - PAIN TYPE
TYPE: ACUTE PAIN
TYPE: CHRONIC PAIN
TYPE: ACUTE PAIN

## 2024-09-26 ASSESSMENT — PAIN SCALES - GENERAL
PAINLEVEL_OUTOF10: 10
PAINLEVEL_OUTOF10: 8
PAINLEVEL_OUTOF10: 6
PAINLEVEL_OUTOF10: 9
PAINLEVEL_OUTOF10: 1
PAINLEVEL_OUTOF10: 9
PAINLEVEL_OUTOF10: 7
PAINLEVEL_OUTOF10: 7

## 2024-09-26 ASSESSMENT — PAIN DESCRIPTION - FREQUENCY
FREQUENCY: CONTINUOUS

## 2024-09-26 ASSESSMENT — PAIN DESCRIPTION - ONSET
ONSET: ON-GOING

## 2024-09-26 ASSESSMENT — PAIN DESCRIPTION - DESCRIPTORS
DESCRIPTORS: ACHING
DESCRIPTORS: SHARP
DESCRIPTORS: ACHING
DESCRIPTORS: SHARP
DESCRIPTORS: SHARP

## 2024-09-26 ASSESSMENT — PAIN DESCRIPTION - LOCATION
LOCATION: RIB CAGE
LOCATION: KNEE
LOCATION: RIB CAGE

## 2024-09-26 NOTE — PROGRESS NOTES
Physical Therapy Treatment Note/Plan of Care    Room #:  0636/0636-01  Patient Name: Niru Chaves  YOB: 1960  MRN: [id number]    Date of Service: 9/26/2024     Tentative placement recommendation: Home with no Physical Therapy needs  Equipment recommendation: None      Evaluating Physical Therapist: Ledy Back, PT #[id number]      Specific Provider Orders/Date/Referring Provider :  09/25/24 1100    PT eval and treat  Start:  09/25/24 1100,   End:  09/25/24 1100,   ONE TIME,   Standing Count:  1 Occurrences,   R       Tamara Medina DO    Admitting Diagnosis:   Shortness of breath [R06.02]  Shock circulatory [R57.9]  Acute deep vein thrombosis (DVT) of other specified vein of right lower extremity (HCC) [I82.491]  Chest pain, unspecified type [R07.9]  Chronic congestive heart failure, unspecified heart failure type (HCC) [I50.9]  Pulmonary embolism without acute cor pulmonale, unspecified chronicity, unspecified pulmonary embolism type (HCC) [I26.99]     pain and swelling in her right lower extremity.   Surgery: none      Patient Active Problem List   Diagnosis    Shock circulatory    Atherosclerosis of native coronary artery of native heart without angina pectoris    Chronic obstructive pulmonary disease (HCC)    Hypertension    CHF (congestive heart failure) (HCC)    Hyperlipidemia    Chronic pain    DJD (degenerative joint disease)    Hx of degenerative disc disease    History of MI (myocardial infarction)    Gastroparesis    Anxiety    History of CVA (cerebrovascular accident)    Pulmonary embolism without acute cor pulmonale (HCC)    Deep venous thrombosis (HCC)    Chest pain    Shortness of breath        ASSESSMENT of Current Deficits Patient exhibits decreased strength, balance, and endurance impairing functional mobility, transfers, gait , gait distance, and tolerance to activity Patient improved with overall tolerance of activity and able to inc ambulation distance. Patient performed stair  PORTABLE    Result Date: 9/24/2024  EXAMINATION: ONE X-RAY VIEW OF THE CHEST 9/24/2024 9:48 am    Increased consolidation in the right lateral lung concerning for pneumonia, likely in the lateral segment of the right middle lobe.  Clinical and imaging follow-up to resolution recommended.     Echo (TTE) complete (PRN contrast/bubble/strain/3D)    Result Date: 9/23/2024    Left Ventricle: Normal left ventricular systolic function with EF of 65 - 70%.     CTA PULMONARY W CONTRAST    Result Date: 9/22/2024  EXAMINATION: CTA OF THE CHEST 9/22/2024 6:12 pm      1. Positive for right-sided pulmonary embolism. No evidence of right heart strain. 2. Mild bronchitis. 3. Coronary disease.     Vascular duplex lower extremity venous right    Result Date: 9/22/2024  EXAMINATION: DUPLEX VENOUS ULTRASOUND OF THE RIGHT LOWER EXTREMITY 9/22/2024 4:25 pm      1.  Extensive right lower extremity DVT, as described above.     XR CHEST PORTABLE    Result Date: 9/22/2024  EXAMINATION: ONE XRAY VIEW OF THE CHEST 9/22/2024 3:15 pm      No evidence of pneumonia or pleural effusion.       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 Active bed bugs at this current home where she cares for him. She has her own home in [place], but she only spends a couple days a week there on the weekend. One story home with 2 steps to enter and standard flight of steps to basement, but she does not have to do down. Tub shower        Equipment owned: Cane,       AM-AMY Basic Mobility        AM-PAC Basic Mobility - Inpatient   How much help is needed turning from your back to your side while in a flat bed without using bedrails?: None  How much help is needed moving from lying on your back to sitting on the side of a flat bed without using bedrails?: A Little  How much help is needed moving to and from a bed to a chair?: A Little  How much help is needed standing up from a chair using

## 2024-09-26 NOTE — PROGRESS NOTES
[facility]  Department of Internal Medicine  Division of Pulmonary, Critical Care and Sleep Medicine  Progress Note      Mitul Hines, APRN-CNP  Eleanor Arturkevin, APRN-CNP    ICU Intensivist Attestation:    I saw, examined, and discussed the patient with Eleanor DOSS-ACNP and agree with her assessment and plan.    The patient continues to improve and her regimen is being adjusted appropriately.    She is now on room air and is saturating well.    It appears as though the plan is being set up for discharge either today or more likely tomorrow.  She should follow-up with a pulmonologist and this will be arranged.  Anticoagulation will need to be continued, probably long-term.    Electronically signed by Shantanu Encarnacion MD on 9/26/2024 at 4:05 PM        Subjective:   Patient seen and examined.  She remains on room air.  She denies any respiratory complaints at this time.  She is asking when she can be discharged home.      OBJECTIVE:     PHYSICAL EXAM:   VITALS:   Vitals:    09/26/24 0349 09/26/24 0901 09/26/24 0915 09/26/24 1012   BP: 103/67  (!) 104/57    Pulse: 85  93    Resp: 21 17 18 18   Temp: 97.8 °F (36.6 °C)  96.8 °F (36 °C)    TempSrc: Infrared  Temporal    SpO2: 93%  94%    Weight:       Height:            Intake/Output Summary (Last 24 hours) at 9/26/2024 1313  Last data filed at 9/26/2024 0840  Gross per 24 hour   Intake 480 ml   Output --   Net 480 ml        CONSTITUTIONAL:   A&O x 3, NAD  SKIN:     No rash, No suspicious lesions, No skin discoloration  HEENT:     EOMI, MMM, No thrush  NECK:    No bruits, No JVP appreciated  CV:      Sinus,  No murmur, No rubs, No gallops  PULMONARY:   Decreased BS,  No Wheezing, No Rales, No Rhonchi      No noted egophony  ABDOMEN:     Soft, non-tender. BS normal. No R/R/G  EXT:    No  deformities .  No clubbing.       no lower extremity edema, No venous stasis  PULSE:   Appears equal and palpable.  PSYCHIATRIC:  Seems appropriate, No acute psychosis  MS:    No fractures, No gross weakness  NEUROLOGIC:   The clinical assessment is non-focal     DATA: IMAGING & TESTING:     LABORATORY TESTS:    CBC with Differential:    Lab Results   Component Value Date/Time    WBC 4.6 09/26/2024 06:42 AM    RBC 3.65 09/26/2024 06:42 AM    HGB 10.3 09/26/2024 06:42 AM    HCT 31.1 09/26/2024 06:42 AM     09/26/2024 06:42 AM    MCV 85.2 09/26/2024 06:42 AM    MCH 28.2 09/26/2024 06:42 AM    MCHC 33.1 09/26/2024 06:42 AM    RDW 14.1 09/26/2024 06:42 AM    LYMPHOPCT 16 09/26/2024 06:42 AM    MONOPCT 9 09/26/2024 06:42 AM    EOSPCT 5 09/26/2024 06:42 AM    BASOPCT 0 09/26/2024 06:42 AM    MONOSABS 0.41 09/26/2024 06:42 AM    LYMPHSABS 0.73 09/26/2024 06:42 AM    EOSABS 0.25 09/26/2024 06:42 AM    BASOSABS 0.00 09/26/2024 06:42 AM     CMP:    Lab Results   Component Value Date/Time     09/26/2024 06:42 AM    K 3.8 09/26/2024 06:42 AM     09/26/2024 06:42 AM    CO2 21 09/26/2024 06:42 AM    BUN 12 09/26/2024 06:42 AM    CREATININE 1.0 09/26/2024 06:42 AM    LABGLOM 67 09/26/2024 06:42 AM    GLUCOSE 99 09/26/2024 06:42 AM    CALCIUM 8.6 09/26/2024 06:42 AM    BILITOT 0.3 09/26/2024 06:42 AM    ALKPHOS 54 09/26/2024 06:42 AM    AST 9 09/26/2024 06:42 AM    ALT 5 09/26/2024 06:42 AM        PRO-BNP:   Lab Results   Component Value Date    PROBNP 267 09/23/2024    PROBNP 154 09/22/2024      ABGs: No results found for: \"PH\", \"PO2\", \"PCO2\"  Hemoglobin A1C: No components found for: \"HGBA1C\"    IMAGING:  Imaging tests were completed and reviewed and discussed radiology and care team involved and reveals   XR CHEST PORTABLE  Result Date: 9/26/2024   Left basilar opacity suggesting a small left pleural effusion and adjacent atelectasis     XR CHEST PORTABLE  Result Date: 9/24/2024  Increased consolidation in the

## 2024-09-26 NOTE — CARE COORDINATION
SOCIAL WORK / DISCHARGE PLANNING:  Sw met with pt at bedside. She is expecting dc as soon as next date, anxious for dc. Room air. Dc plan to home of ex spouse in [place] to assist him. New to [place] and wishes to use Meds to Beds, does have 30 DAY TRIAL COUPON. [facility] completed and copies in soft chart. Therapy evals completed, no recommendations at this time. Pt needs assistance with transport, no family/friends to provide. Taxi voucher in soft chart to use for transport at dc.                 Electronically signed by CRYSTAL Owen on 9/26/2024 at 2:03 PM

## 2024-09-26 NOTE — PROGRESS NOTES
TransDERmal Daily    topiramate  50 mg Oral BID    traZODone  50 mg Oral Nightly    venlafaxine  150 mg Oral Daily    vitamin D  50,000 Units Oral Once per day on Monday Thursday    arformoterol tartrate  15 mcg Nebulization BID RT     fentaNYL, 50 mcg, Q2H PRN  ondansetron, 4 mg, Q6H PRN  sodium chloride flush, 5-40 mL, PRN  sodium chloride, , PRN  acetaminophen, 650 mg, Q6H PRN   Or  acetaminophen, 650 mg, Q6H PRN  ALPRAZolam, 0.5 mg, BID PRN  ipratropium 0.5 mg-albuterol 2.5 mg, 1 Dose, Q4H PRN  sennosides-docusate sodium, 2 tablet, Daily PRN  glucose, 4 tablet, PRN  dextrose bolus, 125 mL, PRN   Or  dextrose bolus, 250 mL, PRN  glucagon (rDNA), 1 mg, PRN  dextrose, , Continuous PRN         Objective:    BP (!) 104/57   Pulse 93   Temp 96.8 °F (36 °C) (Temporal)   Resp 18   Ht 1.727 m (5' 8\")   Wt 76 kg (167 lb 8.8 oz)   SpO2 94%   BMI 25.48 kg/m²     General Appearance: alert and oriented to person, place and time and in no acute distress  Skin: warm and dry, good turgor, no rashes, no jaundice  Eyes: pupils equal, round, and reactive to light, conjunctivae normal  Mouth: clear, moist, no thrush  Neck: No JVD  Pulmonary/Chest: no wheezes, rales or rhonchi, normal air movement, no respiratory distress  Cardiovascular: normal rate, normal S1 and S2  Abdomen: soft, non-tender, non-distended, normal bowel sounds  Extremities: no cyanosis, no clubbing and pos right LL/pedal edema at 1 +, no venous stasis dermatitis  Neurologic: no cranial nerve deficit and speech normal, conversational, mental status baseline/normal        Recent Labs     09/24/24  0443 09/25/24  0550 09/26/24  0642    136 138   K 3.4* 3.7 3.8   * 107 106   CO2 23 20* 21*   BUN 8 9 12   CREATININE 0.8 0.8 1.0   GLUCOSE 107* 94 99   CALCIUM 8.2* 8.5* 8.6       Recent Labs     09/24/24  0443 09/25/24  0550 09/26/24  0642   WBC 4.9 5.4 4.6   RBC 3.47* 3.39* 3.65   HGB 9.8* 9.5* 10.3*   HCT 29.0* 29.2* 31.1*   MCV 83.6 86.1 85.2   MCH  inadvertent computerized transcription errors may be present.  Electronically signed by Bria Jackson DO on 9/26/2024 at 1:46 PM

## 2024-09-26 NOTE — PLAN OF CARE
Problem: Safety - Adult  Goal: Free from fall injury  Outcome: Progressing  Flowsheets (Taken 9/26/2024 1823)  Free From Fall Injury: Instruct family/caregiver on patient safety     Problem: Discharge Planning  Goal: Discharge to home or other facility with appropriate resources  Outcome: Progressing  Flowsheets (Taken 9/26/2024 0800)  Discharge to home or other facility with appropriate resources: Identify barriers to discharge with patient and caregiver     Problem: Pain  Goal: Verbalizes/displays adequate comfort level or baseline comfort level  Outcome: Progressing  Flowsheets  Taken 9/26/2024 1823  Verbalizes/displays adequate comfort level or baseline comfort level:   Encourage patient to monitor pain and request assistance   Assess pain using appropriate pain scale   Administer analgesics based on type and severity of pain and evaluate response  Taken 9/26/2024 1430  Verbalizes/displays adequate comfort level or baseline comfort level: Encourage patient to monitor pain and request assistance  Taken 9/26/2024 0915  Verbalizes/displays adequate comfort level or baseline comfort level:   Encourage patient to monitor pain and request assistance   Assess pain using appropriate pain scale   Administer analgesics based on type and severity of pain and evaluate response     Problem: Skin/Tissue Integrity  Goal: Absence of new skin breakdown  Description: 1.  Monitor for areas of redness and/or skin breakdown  2.  Assess vascular access sites hourly  3.  Every 4-6 hours minimum:  Change oxygen saturation probe site  4.  Every 4-6 hours:  If on nasal continuous positive airway pressure, respiratory therapy assess nares and determine need for appliance change or resting period.  Outcome: Progressing     Problem: ABCDS Injury Assessment  Goal: Absence of physical injury  Outcome: Progressing  Flowsheets (Taken 9/26/2024 1823)  Absence of Physical Injury: Implement safety measures based on patient assessment

## 2024-09-27 VITALS
OXYGEN SATURATION: 100 % | HEART RATE: 89 BPM | DIASTOLIC BLOOD PRESSURE: 56 MMHG | SYSTOLIC BLOOD PRESSURE: 112 MMHG | HEIGHT: 68 IN | RESPIRATION RATE: 18 BRPM | TEMPERATURE: 97.8 F | BODY MASS INDEX: 25.39 KG/M2 | WEIGHT: 167.55 LBS

## 2024-09-27 PROBLEM — F17.200 SMOKER: Status: ACTIVE | Noted: 2024-09-27

## 2024-09-27 LAB
ALBUMIN SERPL-MCNC: 3.2 G/DL (ref 3.5–5.2)
ALP SERPL-CCNC: 55 U/L (ref 35–104)
ALT SERPL-CCNC: 5 U/L (ref 0–32)
ANION GAP SERPL CALCULATED.3IONS-SCNC: 12 MMOL/L (ref 7–16)
AST SERPL-CCNC: 10 U/L (ref 0–31)
BASOPHILS # BLD: 0.01 K/UL (ref 0–0.2)
BASOPHILS NFR BLD: 0 % (ref 0–2)
BILIRUB SERPL-MCNC: 0.3 MG/DL (ref 0–1.2)
BUN SERPL-MCNC: 14 MG/DL (ref 6–23)
CALCIUM SERPL-MCNC: 8.9 MG/DL (ref 8.6–10.2)
CHLORIDE SERPL-SCNC: 109 MMOL/L (ref 98–107)
CO2 SERPL-SCNC: 20 MMOL/L (ref 22–29)
CREAT SERPL-MCNC: 0.9 MG/DL (ref 0.5–1)
EOSINOPHIL # BLD: 0.28 K/UL (ref 0.05–0.5)
EOSINOPHILS RELATIVE PERCENT: 6 % (ref 0–6)
ERYTHROCYTE [DISTWIDTH] IN BLOOD BY AUTOMATED COUNT: 14.2 % (ref 11.5–15)
F5 P.R506Q BLD/T QL: NEGATIVE
GFR, ESTIMATED: 70 ML/MIN/1.73M2
GLUCOSE SERPL-MCNC: 93 MG/DL (ref 74–99)
HCT VFR BLD AUTO: 32.8 % (ref 34–48)
HGB BLD-MCNC: 10.7 G/DL (ref 11.5–15.5)
IMM GRANULOCYTES # BLD AUTO: 0.03 K/UL (ref 0–0.58)
IMM GRANULOCYTES NFR BLD: 1 % (ref 0–5)
LYMPHOCYTES NFR BLD: 0.93 K/UL (ref 1.5–4)
LYMPHOCYTES RELATIVE PERCENT: 18 % (ref 20–42)
MAGNESIUM SERPL-MCNC: 2.1 MG/DL (ref 1.6–2.6)
MCH RBC QN AUTO: 28.1 PG (ref 26–35)
MCHC RBC AUTO-ENTMCNC: 32.6 G/DL (ref 32–34.5)
MCV RBC AUTO: 86.1 FL (ref 80–99.9)
MONOCYTES NFR BLD: 0.44 K/UL (ref 0.1–0.95)
MONOCYTES NFR BLD: 9 % (ref 2–12)
NEUTROPHILS NFR BLD: 67 % (ref 43–80)
NEUTS SEG NFR BLD: 3.39 K/UL (ref 1.8–7.3)
PHOSPHATE SERPL-MCNC: 4.4 MG/DL (ref 2.5–4.5)
PLATELET # BLD AUTO: 349 K/UL (ref 130–450)
PMV BLD AUTO: 8.9 FL (ref 7–12)
POTASSIUM SERPL-SCNC: 3.5 MMOL/L (ref 3.5–5)
PROT SERPL-MCNC: 6.4 G/DL (ref 6.4–8.3)
RBC # BLD AUTO: 3.81 M/UL (ref 3.5–5.5)
SODIUM SERPL-SCNC: 141 MMOL/L (ref 132–146)
SPECIMEN SOURCE: NORMAL
WBC OTHER # BLD: 5.1 K/UL (ref 4.5–11.5)

## 2024-09-27 PROCEDURE — 94640 AIRWAY INHALATION TREATMENT: CPT

## 2024-09-27 PROCEDURE — 6360000002 HC RX W HCPCS: Performed by: FAMILY MEDICINE

## 2024-09-27 PROCEDURE — 2580000003 HC RX 258: Performed by: INTERNAL MEDICINE

## 2024-09-27 PROCEDURE — 6370000000 HC RX 637 (ALT 250 FOR IP): Performed by: INTERNAL MEDICINE

## 2024-09-27 PROCEDURE — 6360000002 HC RX W HCPCS: Performed by: INTERNAL MEDICINE

## 2024-09-27 PROCEDURE — 2580000003 HC RX 258: Performed by: FAMILY MEDICINE

## 2024-09-27 PROCEDURE — 85025 COMPLETE CBC W/AUTO DIFF WBC: CPT

## 2024-09-27 PROCEDURE — 36415 COLL VENOUS BLD VENIPUNCTURE: CPT

## 2024-09-27 PROCEDURE — 99231 SBSQ HOSP IP/OBS SF/LOW 25: CPT | Performed by: INTERNAL MEDICINE

## 2024-09-27 PROCEDURE — 80053 COMPREHEN METABOLIC PANEL: CPT

## 2024-09-27 PROCEDURE — 83735 ASSAY OF MAGNESIUM: CPT

## 2024-09-27 PROCEDURE — 84100 ASSAY OF PHOSPHORUS: CPT

## 2024-09-27 PROCEDURE — 6370000000 HC RX 637 (ALT 250 FOR IP): Performed by: FAMILY MEDICINE

## 2024-09-27 RX ORDER — LIDOCAINE 4 G/G
1 PATCH TOPICAL DAILY
Qty: 60 PATCH | Refills: 0 | Status: SHIPPED | OUTPATIENT
Start: 2024-09-28

## 2024-09-27 RX ADMIN — FENTANYL CITRATE 50 MCG: 0.05 INJECTION, SOLUTION INTRAMUSCULAR; INTRAVENOUS at 03:58

## 2024-09-27 RX ADMIN — VENLAFAXINE HYDROCHLORIDE 150 MG: 75 CAPSULE, EXTENDED RELEASE ORAL at 08:31

## 2024-09-27 RX ADMIN — SODIUM CHLORIDE, PRESERVATIVE FREE 40 MG: 5 INJECTION INTRAVENOUS at 08:31

## 2024-09-27 RX ADMIN — FENTANYL CITRATE 50 MCG: 0.05 INJECTION, SOLUTION INTRAMUSCULAR; INTRAVENOUS at 12:07

## 2024-09-27 RX ADMIN — FENTANYL CITRATE 50 MCG: 0.05 INJECTION, SOLUTION INTRAMUSCULAR; INTRAVENOUS at 06:09

## 2024-09-27 RX ADMIN — APIXABAN 10 MG: 5 TABLET, FILM COATED ORAL at 08:21

## 2024-09-27 RX ADMIN — ASPIRIN 81 MG CHEWABLE TABLET 81 MG: 81 TABLET CHEWABLE at 08:31

## 2024-09-27 RX ADMIN — ARFORMOTEROL TARTRATE 15 MCG: 15 SOLUTION RESPIRATORY (INHALATION) at 05:14

## 2024-09-27 RX ADMIN — ALPRAZOLAM 0.5 MG: 0.5 TABLET ORAL at 08:31

## 2024-09-27 RX ADMIN — FOLIC ACID 1 MG: 1 TABLET ORAL at 08:31

## 2024-09-27 RX ADMIN — CLOPIDOGREL BISULFATE 75 MG: 75 TABLET ORAL at 08:31

## 2024-09-27 RX ADMIN — BUDESONIDE 500 MCG: 0.5 SUSPENSION RESPIRATORY (INHALATION) at 05:14

## 2024-09-27 RX ADMIN — TOPIRAMATE 50 MG: 25 TABLET, FILM COATED ORAL at 08:32

## 2024-09-27 RX ADMIN — AZITHROMYCIN DIHYDRATE 500 MG: 500 INJECTION, POWDER, LYOPHILIZED, FOR SOLUTION INTRAVENOUS at 08:39

## 2024-09-27 RX ADMIN — IPRATROPIUM BROMIDE AND ALBUTEROL SULFATE 1 DOSE: 2.5; .5 SOLUTION RESPIRATORY (INHALATION) at 05:14

## 2024-09-27 RX ADMIN — Medication 10 ML: at 08:33

## 2024-09-27 RX ADMIN — FENTANYL CITRATE 50 MCG: 0.05 INJECTION, SOLUTION INTRAMUSCULAR; INTRAVENOUS at 14:16

## 2024-09-27 RX ADMIN — MORPHINE SULFATE 15 MG: 15 TABLET, FILM COATED, EXTENDED RELEASE ORAL at 10:46

## 2024-09-27 RX ADMIN — FENTANYL CITRATE 50 MCG: 0.05 INJECTION, SOLUTION INTRAMUSCULAR; INTRAVENOUS at 08:26

## 2024-09-27 RX ADMIN — IPRATROPIUM BROMIDE AND ALBUTEROL SULFATE 1 DOSE: 2.5; .5 SOLUTION RESPIRATORY (INHALATION) at 09:51

## 2024-09-27 ASSESSMENT — PAIN DESCRIPTION - ORIENTATION
ORIENTATION: RIGHT

## 2024-09-27 ASSESSMENT — PAIN DESCRIPTION - LOCATION
LOCATION: RIB CAGE

## 2024-09-27 ASSESSMENT — PAIN - FUNCTIONAL ASSESSMENT
PAIN_FUNCTIONAL_ASSESSMENT: PREVENTS OR INTERFERES SOME ACTIVE ACTIVITIES AND ADLS
PAIN_FUNCTIONAL_ASSESSMENT: PREVENTS OR INTERFERES SOME ACTIVE ACTIVITIES AND ADLS

## 2024-09-27 ASSESSMENT — PAIN DESCRIPTION - DESCRIPTORS
DESCRIPTORS: SHARP

## 2024-09-27 ASSESSMENT — PAIN SCALES - GENERAL
PAINLEVEL_OUTOF10: 10
PAINLEVEL_OUTOF10: 7
PAINLEVEL_OUTOF10: 7
PAINLEVEL_OUTOF10: 10

## 2024-09-27 ASSESSMENT — PAIN DESCRIPTION - ONSET: ONSET: ON-GOING

## 2024-09-27 ASSESSMENT — PAIN DESCRIPTION - FREQUENCY: FREQUENCY: CONTINUOUS

## 2024-09-27 ASSESSMENT — PAIN DESCRIPTION - PAIN TYPE: TYPE: ACUTE PAIN

## 2024-09-27 NOTE — PROGRESS NOTES
[facility]  Department of Internal Medicine  Division of Pulmonary, Critical Care and Sleep Medicine  Progress Note      Mitul Hines, APRN-CNP  Eleanor Apple, APRN-CNP        ICU Intensivist Attestation:    I saw, examined, and discussed the patient with Eleanor DOSS-ACN and agree with her assessment and plan.    The patient is continue to make excellent progress and is now on room air.  She is scheduled for discharge today.  She will need to be followed in the pulmonology clinic and this will be arranged with Dr. Ochoa's office.  We spoke about the absolute necessity for the patient to stop smoking altogether and she says that she is already made this decision.    From the pulmonary standpoint, patient is an acceptable candidate for discharge.    Electronically signed by Shantanu Encarnacion MD on 9/27/2024 at 4:07 PM        Subjective:   Patient seen and examined.  She was seen on room air.  She is eager for discharge home today.  She denies any respiratory complaints.  Ambulatory pulse ox was done with good saturations, with no need for oxygen requirements.        OBJECTIVE:     PHYSICAL EXAM:   VITALS:   Vitals:    09/27/24 0350 09/27/24 0514 09/27/24 0826 09/27/24 1215   BP: (!) 118/58  (!) 91/51 (!) 112/56   Pulse: 85  88 89   Resp: 18  18 18   Temp: 98.2 °F (36.8 °C)  97.3 °F (36.3 °C) 97.8 °F (36.6 °C)   TempSrc: Oral  Temporal Temporal   SpO2: 97% 97% 95% 100%   Weight:       Height:          No intake or output data in the 24 hours ending 09/27/24 1239       CONSTITUTIONAL:   A&O x 3, NAD  SKIN:     No rash, No suspicious lesions, No skin discoloration  HEENT:     EOMI, MMM, No thrush  NECK:    No bruits, No JVP appreciated  CV:      Sinus,  No murmur, No rubs, No gallops  PULMONARY:   Decreased BS,  No Wheezing, No Rales, No

## 2024-09-27 NOTE — DISCHARGE INSTR - DIET
Good nutrition is important when healing from an illness, injury, or surgery.  Follow any nutrition recommendations given to you during your hospital stay.   If you were given an oral nutrition supplement while in the hospital, continue to take this supplement at home.  You can take it with meals, in-between meals, and/or before bedtime. These supplements can be purchased at most local grocery stores, pharmacies, and chain [employer]-stores.   If you have any questions about your diet or nutrition, call the hospital and ask for the dietitian.      Low cholesterol/low salt diet

## 2024-09-27 NOTE — PROGRESS NOTES
CLINICAL PHARMACY NOTE: MEDS TO BEDS    Total # of Prescriptions Filled: 2   The following medications were delivered to the patient:  Lidocaine pain relief 4 % patch  Eliquis 5 mg    Additional Documentation:

## 2024-09-27 NOTE — PROGRESS NOTES
Physical Therapy      Physical Therapy    Room #:   0636/0636-01    Date: 2024       Patient Name: Niru Chaves  : 1960      MRN: [id number]     Patient unavailable for physical therapy treatment due to  patient awaiting discharge, no PT needs reported.    Meghan Cano, PTA  #[id number]

## 2024-09-27 NOTE — CARE COORDINATION
SOCIAL WORK / DISCHARGE PLANNING:  Discussed case with Dr Jackson this am, wanted walk test completed. Pt was 100 % on room air at rest and with exertion. Dc plan to return home with ex spouse locally. Meds to Beds requested, new Eliquis ordered, will be able to use 30 DAY FREE TRIAL COUPON. Taxi voucher in soft chart, will need transport arranged when dc order obtained.             Electronically signed by CRYSTAL Owen on 9/27/2024 at 12:40 PM

## 2024-09-27 NOTE — CARE COORDINATION
The patients pcp office said they will call her Monday to set up a follow-up visit because the schedulers all went home for the day.

## 2024-09-27 NOTE — PROGRESS NOTES
Pulse oximetry assessment   100% at rest on room air (if 88% or less, skip next steps)  100% while ambulating on room air.

## 2024-09-28 LAB
MICROORGANISM SPEC CULT: NORMAL
MICROORGANISM SPEC CULT: NORMAL
SERVICE CMNT-IMP: NORMAL
SERVICE CMNT-IMP: NORMAL
SPECIMEN DESCRIPTION: NORMAL
SPECIMEN DESCRIPTION: NORMAL

## 2024-09-29 NOTE — DISCHARGE SUMMARY
[facility] [facility]       Hospitalist Physician Discharge Summary       Crystal Paulino MD  [address]  [place] [address]  [phone]    Follow up in 3 day(s)      [facility]  [phone]  Follow up      Crystal Paulino MD  [address]  [address]  [place] [address]  [phone]    Go on 9/27/2024  YOUR primary care provider will contact you monday to schedule a hospital follow-up visit.      Activity level:   Up as tolerated    Diet:   Cardiac    Labs:  None    Condition at discharge: good/stable    Dispo: to home with home health care      Patient ID:  Niru Chaves  [id number]  64 y.o.  1960    Admit date: 9/22/2024    Discharge date: 9/27/2024    Admission Diagnoses: Principal Problem:    Shock circulatory  Active Problems:    Atherosclerosis of native coronary artery of native heart without angina pectoris    Chronic obstructive pulmonary disease (HCC)    Hypertension    CHF (congestive heart failure) (HCC)    Hyperlipidemia    Chronic pain    Pulmonary embolism without acute cor pulmonale (HCC)    Deep venous thrombosis (HCC)    Smoker  Resolved Problems:    * No resolved hospital problems. *      Discharge Diagnoses: Principal Problem:    Shock circulatory  Active Problems:    Atherosclerosis of native coronary artery of native heart without angina pectoris    Chronic obstructive pulmonary disease (HCC)    Hypertension    CHF (congestive heart failure) (HCC)    Hyperlipidemia    Chronic pain    Pulmonary embolism without acute cor pulmonale (HCC)    Deep venous thrombosis (HCC)    Smoker  Resolved Problems:    * No resolved hospital problems. *      Consults:  IP CONSULT TO CRITICAL CARE  IP CONSULT TO CRITICAL CARE    Procedures:   ECHOcardiogram    Lower extremity vascular duplex    CTA of the chest    Hospital Course:    64 y.o. female with a history of COPD, CAD, CHF, HTN, HLD, tobacco use- 1 PPD, DJD,

## 2024-11-03 ENCOUNTER — HOSPITAL ENCOUNTER (INPATIENT)
Age: 64
LOS: 2 days | Discharge: HOME | DRG: 194 | End: 2024-11-05
Attending: EMERGENCY MEDICINE | Admitting: FAMILY MEDICINE
Payer: COMMERCIAL

## 2024-11-03 ENCOUNTER — APPOINTMENT (OUTPATIENT)
Dept: GENERAL RADIOLOGY | Age: 64
DRG: 194 | End: 2024-11-03
Payer: COMMERCIAL

## 2024-11-03 ENCOUNTER — APPOINTMENT (OUTPATIENT)
Dept: CT IMAGING | Age: 64
DRG: 194 | End: 2024-11-03
Payer: COMMERCIAL

## 2024-11-03 DIAGNOSIS — J18.9 PNEUMONIA OF BOTH LUNGS DUE TO INFECTIOUS ORGANISM, UNSPECIFIED PART OF LUNG: Primary | ICD-10-CM

## 2024-11-03 DIAGNOSIS — Z87.39 HX OF DEGENERATIVE DISC DISEASE: ICD-10-CM

## 2024-11-03 DIAGNOSIS — R07.82 INTERCOSTAL PAIN: ICD-10-CM

## 2024-11-03 DIAGNOSIS — E87.6 HYPOKALEMIA: ICD-10-CM

## 2024-11-03 DIAGNOSIS — E16.2 HYPOGLYCEMIA: ICD-10-CM

## 2024-11-03 DIAGNOSIS — R55 SYNCOPE AND COLLAPSE: ICD-10-CM

## 2024-11-03 LAB
ALBUMIN SERPL-MCNC: 4.3 G/DL (ref 3.5–5.2)
ALP SERPL-CCNC: 58 U/L (ref 35–104)
ALT SERPL-CCNC: 7 U/L (ref 0–32)
ANION GAP SERPL CALCULATED.3IONS-SCNC: 15 MMOL/L (ref 7–16)
AST SERPL-CCNC: 11 U/L (ref 0–31)
BASOPHILS # BLD: 0.02 K/UL (ref 0–0.2)
BASOPHILS NFR BLD: 0 % (ref 0–2)
BILIRUB SERPL-MCNC: 0.3 MG/DL (ref 0–1.2)
BUN SERPL-MCNC: 11 MG/DL (ref 6–23)
CALCIUM SERPL-MCNC: 9.9 MG/DL (ref 8.6–10.2)
CHLORIDE SERPL-SCNC: 100 MMOL/L (ref 98–107)
CO2 SERPL-SCNC: 26 MMOL/L (ref 22–29)
CREAT SERPL-MCNC: 0.8 MG/DL (ref 0.5–1)
D-DIMER QUANTITATIVE: 207 NG/ML DDU (ref 0–230)
EOSINOPHIL # BLD: 0.26 K/UL (ref 0.05–0.5)
EOSINOPHILS RELATIVE PERCENT: 3 % (ref 0–6)
ERYTHROCYTE [DISTWIDTH] IN BLOOD BY AUTOMATED COUNT: 13.6 % (ref 11.5–15)
GFR, ESTIMATED: 80 ML/MIN/1.73M2
GLUCOSE BLD-MCNC: 96 MG/DL (ref 74–99)
GLUCOSE SERPL-MCNC: 54 MG/DL (ref 74–99)
HCT VFR BLD AUTO: 41.2 % (ref 34–48)
HGB BLD-MCNC: 14.1 G/DL (ref 11.5–15.5)
IMM GRANULOCYTES # BLD AUTO: 0.03 K/UL (ref 0–0.58)
IMM GRANULOCYTES NFR BLD: 0 % (ref 0–5)
INR PPP: 1.2
LYMPHOCYTES NFR BLD: 1.92 K/UL (ref 1.5–4)
LYMPHOCYTES RELATIVE PERCENT: 25 % (ref 20–42)
MAGNESIUM SERPL-MCNC: 1.8 MG/DL (ref 1.6–2.6)
MCH RBC QN AUTO: 28.9 PG (ref 26–35)
MCHC RBC AUTO-ENTMCNC: 34.2 G/DL (ref 32–34.5)
MCV RBC AUTO: 84.4 FL (ref 80–99.9)
MONOCYTES NFR BLD: 0.66 K/UL (ref 0.1–0.95)
MONOCYTES NFR BLD: 9 % (ref 2–12)
NEUTROPHILS NFR BLD: 62 % (ref 43–80)
NEUTS SEG NFR BLD: 4.7 K/UL (ref 1.8–7.3)
PARTIAL THROMBOPLASTIN TIME: 62.1 SEC (ref 24.5–35.1)
PLATELET # BLD AUTO: 318 K/UL (ref 130–450)
PMV BLD AUTO: 8.8 FL (ref 7–12)
POTASSIUM SERPL-SCNC: 3.1 MMOL/L (ref 3.5–5)
PROT SERPL-MCNC: 7 G/DL (ref 6.4–8.3)
PROTHROMBIN TIME: 13.2 SEC (ref 9.3–12.4)
RBC # BLD AUTO: 4.88 M/UL (ref 3.5–5.5)
SODIUM SERPL-SCNC: 141 MMOL/L (ref 132–146)
TROPONIN I SERPL HS-MCNC: 10 NG/L (ref 0–9)
TROPONIN I SERPL HS-MCNC: 9 NG/L (ref 0–9)
WBC OTHER # BLD: 7.6 K/UL (ref 4.5–11.5)

## 2024-11-03 PROCEDURE — 6360000002 HC RX W HCPCS: Performed by: FAMILY MEDICINE

## 2024-11-03 PROCEDURE — 85025 COMPLETE CBC W/AUTO DIFF WBC: CPT

## 2024-11-03 PROCEDURE — 87899 AGENT NOS ASSAY W/OPTIC: CPT

## 2024-11-03 PROCEDURE — 6370000000 HC RX 637 (ALT 250 FOR IP): Performed by: FAMILY MEDICINE

## 2024-11-03 PROCEDURE — 93005 ELECTROCARDIOGRAM TRACING: CPT

## 2024-11-03 PROCEDURE — 82962 GLUCOSE BLOOD TEST: CPT

## 2024-11-03 PROCEDURE — 84484 ASSAY OF TROPONIN QUANT: CPT

## 2024-11-03 PROCEDURE — 2580000003 HC RX 258

## 2024-11-03 PROCEDURE — 2060000000 HC ICU INTERMEDIATE R&B

## 2024-11-03 PROCEDURE — 6360000002 HC RX W HCPCS

## 2024-11-03 PROCEDURE — 2500000003 HC RX 250 WO HCPCS

## 2024-11-03 PROCEDURE — 96365 THER/PROPH/DIAG IV INF INIT: CPT

## 2024-11-03 PROCEDURE — 99222 1ST HOSP IP/OBS MODERATE 55: CPT | Performed by: FAMILY MEDICINE

## 2024-11-03 PROCEDURE — 96375 TX/PRO/DX INJ NEW DRUG ADDON: CPT

## 2024-11-03 PROCEDURE — 87449 NOS EACH ORGANISM AG IA: CPT

## 2024-11-03 PROCEDURE — 85610 PROTHROMBIN TIME: CPT

## 2024-11-03 PROCEDURE — 85379 FIBRIN DEGRADATION QUANT: CPT

## 2024-11-03 PROCEDURE — 0202U NFCT DS 22 TRGT SARS-COV-2: CPT

## 2024-11-03 PROCEDURE — 6370000000 HC RX 637 (ALT 250 FOR IP)

## 2024-11-03 PROCEDURE — 99285 EMERGENCY DEPT VISIT HI MDM: CPT

## 2024-11-03 PROCEDURE — 80053 COMPREHEN METABOLIC PANEL: CPT

## 2024-11-03 PROCEDURE — 2580000003 HC RX 258: Performed by: FAMILY MEDICINE

## 2024-11-03 PROCEDURE — 87040 BLOOD CULTURE FOR BACTERIA: CPT

## 2024-11-03 PROCEDURE — 70450 CT HEAD/BRAIN W/O DYE: CPT

## 2024-11-03 PROCEDURE — 71045 X-RAY EXAM CHEST 1 VIEW: CPT

## 2024-11-03 PROCEDURE — 83735 ASSAY OF MAGNESIUM: CPT

## 2024-11-03 PROCEDURE — 36415 COLL VENOUS BLD VENIPUNCTURE: CPT

## 2024-11-03 PROCEDURE — 85730 THROMBOPLASTIN TIME PARTIAL: CPT

## 2024-11-03 RX ORDER — IPRATROPIUM BROMIDE AND ALBUTEROL SULFATE 2.5; .5 MG/3ML; MG/3ML
1 SOLUTION RESPIRATORY (INHALATION)
Status: DISCONTINUED | OUTPATIENT
Start: 2024-11-04 | End: 2024-11-05 | Stop reason: HOSPADM

## 2024-11-03 RX ORDER — NICOTINE 21 MG/24HR
1 PATCH, TRANSDERMAL 24 HOURS TRANSDERMAL DAILY
Status: DISCONTINUED | OUTPATIENT
Start: 2024-11-04 | End: 2024-11-05 | Stop reason: HOSPADM

## 2024-11-03 RX ORDER — ATORVASTATIN CALCIUM 40 MG/1
40 TABLET, FILM COATED ORAL NIGHTLY
Status: DISCONTINUED | OUTPATIENT
Start: 2024-11-03 | End: 2024-11-05 | Stop reason: HOSPADM

## 2024-11-03 RX ORDER — TOPIRAMATE 25 MG/1
50 TABLET, FILM COATED ORAL 2 TIMES DAILY
Status: DISCONTINUED | OUTPATIENT
Start: 2024-11-03 | End: 2024-11-05 | Stop reason: HOSPADM

## 2024-11-03 RX ORDER — LISINOPRIL 20 MG/1
40 TABLET ORAL DAILY
Status: DISCONTINUED | OUTPATIENT
Start: 2024-11-04 | End: 2024-11-05 | Stop reason: HOSPADM

## 2024-11-03 RX ORDER — ISOSORBIDE MONONITRATE 60 MG/1
60 TABLET, EXTENDED RELEASE ORAL DAILY
Status: DISCONTINUED | OUTPATIENT
Start: 2024-11-04 | End: 2024-11-05 | Stop reason: HOSPADM

## 2024-11-03 RX ORDER — ONDANSETRON 2 MG/ML
4 INJECTION INTRAMUSCULAR; INTRAVENOUS ONCE
Status: COMPLETED | OUTPATIENT
Start: 2024-11-03 | End: 2024-11-03

## 2024-11-03 RX ORDER — CLOPIDOGREL BISULFATE 75 MG/1
75 TABLET ORAL DAILY
Status: DISCONTINUED | OUTPATIENT
Start: 2024-11-04 | End: 2024-11-05 | Stop reason: HOSPADM

## 2024-11-03 RX ORDER — ASPIRIN 81 MG/1
81 TABLET ORAL DAILY
Status: DISCONTINUED | OUTPATIENT
Start: 2024-11-04 | End: 2024-11-05 | Stop reason: HOSPADM

## 2024-11-03 RX ORDER — GUAIFENESIN/DEXTROMETHORPHAN 100-10MG/5
10 SYRUP ORAL EVERY 6 HOURS PRN
Status: DISCONTINUED | OUTPATIENT
Start: 2024-11-03 | End: 2024-11-05 | Stop reason: HOSPADM

## 2024-11-03 RX ORDER — LIDOCAINE 4 G/G
1 PATCH TOPICAL DAILY
Status: DISCONTINUED | OUTPATIENT
Start: 2024-11-04 | End: 2024-11-05 | Stop reason: HOSPADM

## 2024-11-03 RX ORDER — KETOROLAC TROMETHAMINE 15 MG/ML
15 INJECTION, SOLUTION INTRAMUSCULAR; INTRAVENOUS ONCE
Status: COMPLETED | OUTPATIENT
Start: 2024-11-03 | End: 2024-11-03

## 2024-11-03 RX ORDER — ONDANSETRON 2 MG/ML
4 INJECTION INTRAMUSCULAR; INTRAVENOUS EVERY 6 HOURS PRN
Status: DISCONTINUED | OUTPATIENT
Start: 2024-11-03 | End: 2024-11-05 | Stop reason: HOSPADM

## 2024-11-03 RX ORDER — MORPHINE SULFATE 15 MG/1
15 TABLET, FILM COATED, EXTENDED RELEASE ORAL EVERY 12 HOURS
Status: DISCONTINUED | OUTPATIENT
Start: 2024-11-03 | End: 2024-11-05 | Stop reason: HOSPADM

## 2024-11-03 RX ORDER — SODIUM CHLORIDE 0.9 % (FLUSH) 0.9 %
5-40 SYRINGE (ML) INJECTION PRN
Status: DISCONTINUED | OUTPATIENT
Start: 2024-11-03 | End: 2024-11-05 | Stop reason: HOSPADM

## 2024-11-03 RX ORDER — FOLIC ACID 1 MG/1
1 TABLET ORAL DAILY
Status: DISCONTINUED | OUTPATIENT
Start: 2024-11-04 | End: 2024-11-05 | Stop reason: HOSPADM

## 2024-11-03 RX ORDER — QUETIAPINE FUMARATE 100 MG/1
300 TABLET, FILM COATED ORAL DAILY
Status: DISCONTINUED | OUTPATIENT
Start: 2024-11-03 | End: 2024-11-05 | Stop reason: HOSPADM

## 2024-11-03 RX ORDER — ALPRAZOLAM 0.5 MG
0.5 TABLET ORAL 2 TIMES DAILY PRN
Status: DISCONTINUED | OUTPATIENT
Start: 2024-11-03 | End: 2024-11-05 | Stop reason: HOSPADM

## 2024-11-03 RX ORDER — ACETAMINOPHEN 325 MG/1
650 TABLET ORAL ONCE
Status: COMPLETED | OUTPATIENT
Start: 2024-11-03 | End: 2024-11-03

## 2024-11-03 RX ORDER — PROMETHAZINE HYDROCHLORIDE 25 MG/1
12.5 TABLET ORAL EVERY 6 HOURS PRN
Status: DISCONTINUED | OUTPATIENT
Start: 2024-11-03 | End: 2024-11-05 | Stop reason: HOSPADM

## 2024-11-03 RX ORDER — SODIUM CHLORIDE 9 MG/ML
INJECTION, SOLUTION INTRAVENOUS PRN
Status: DISCONTINUED | OUTPATIENT
Start: 2024-11-03 | End: 2024-11-05 | Stop reason: HOSPADM

## 2024-11-03 RX ORDER — AZITHROMYCIN 250 MG/1
500 TABLET, FILM COATED ORAL EVERY 24 HOURS
Status: DISCONTINUED | OUTPATIENT
Start: 2024-11-03 | End: 2024-11-05 | Stop reason: HOSPADM

## 2024-11-03 RX ORDER — SODIUM CHLORIDE 0.9 % (FLUSH) 0.9 %
5-40 SYRINGE (ML) INJECTION EVERY 12 HOURS SCHEDULED
Status: DISCONTINUED | OUTPATIENT
Start: 2024-11-03 | End: 2024-11-05 | Stop reason: HOSPADM

## 2024-11-03 RX ORDER — POTASSIUM CHLORIDE 1500 MG/1
40 TABLET, EXTENDED RELEASE ORAL ONCE
Status: CANCELLED | OUTPATIENT
Start: 2024-11-03 | End: 2024-11-03

## 2024-11-03 RX ORDER — ACETAMINOPHEN 325 MG/1
650 TABLET ORAL EVERY 6 HOURS PRN
Status: DISCONTINUED | OUTPATIENT
Start: 2024-11-03 | End: 2024-11-05 | Stop reason: HOSPADM

## 2024-11-03 RX ORDER — 0.9 % SODIUM CHLORIDE 0.9 %
1000 INTRAVENOUS SOLUTION INTRAVENOUS ONCE
Status: COMPLETED | OUTPATIENT
Start: 2024-11-03 | End: 2024-11-03

## 2024-11-03 RX ORDER — SENNOSIDES 8.8 MG/5ML
5 LIQUID ORAL 2 TIMES DAILY PRN
Status: DISCONTINUED | OUTPATIENT
Start: 2024-11-03 | End: 2024-11-05 | Stop reason: HOSPADM

## 2024-11-03 RX ORDER — ACETAMINOPHEN 650 MG/1
650 SUPPOSITORY RECTAL EVERY 6 HOURS PRN
Status: DISCONTINUED | OUTPATIENT
Start: 2024-11-03 | End: 2024-11-05 | Stop reason: HOSPADM

## 2024-11-03 RX ORDER — METOPROLOL SUCCINATE 25 MG/1
25 TABLET, EXTENDED RELEASE ORAL DAILY
Status: DISCONTINUED | OUTPATIENT
Start: 2024-11-04 | End: 2024-11-05 | Stop reason: HOSPADM

## 2024-11-03 RX ADMIN — WATER 1000 MG: 1 INJECTION INTRAMUSCULAR; INTRAVENOUS; SUBCUTANEOUS at 15:16

## 2024-11-03 RX ADMIN — ONDANSETRON 4 MG: 2 INJECTION, SOLUTION INTRAMUSCULAR; INTRAVENOUS at 14:44

## 2024-11-03 RX ADMIN — DOXYCYCLINE 100 MG: 100 INJECTION, POWDER, LYOPHILIZED, FOR SOLUTION INTRAVENOUS at 15:18

## 2024-11-03 RX ADMIN — TOPIRAMATE 50 MG: 25 TABLET, FILM COATED ORAL at 21:44

## 2024-11-03 RX ADMIN — KETOROLAC TROMETHAMINE 15 MG: 15 INJECTION, SOLUTION INTRAMUSCULAR; INTRAVENOUS at 17:42

## 2024-11-03 RX ADMIN — SODIUM CHLORIDE, PRESERVATIVE FREE 10 ML: 5 INJECTION INTRAVENOUS at 22:12

## 2024-11-03 RX ADMIN — APIXABAN 5 MG: 5 TABLET, FILM COATED ORAL at 21:44

## 2024-11-03 RX ADMIN — POTASSIUM BICARBONATE 50 MEQ: 978 TABLET, EFFERVESCENT ORAL at 14:39

## 2024-11-03 RX ADMIN — ONDANSETRON 4 MG: 2 INJECTION, SOLUTION INTRAMUSCULAR; INTRAVENOUS at 23:37

## 2024-11-03 RX ADMIN — SODIUM CHLORIDE 1000 ML: 9 INJECTION, SOLUTION INTRAVENOUS at 14:39

## 2024-11-03 RX ADMIN — ATORVASTATIN CALCIUM 40 MG: 40 TABLET, FILM COATED ORAL at 21:44

## 2024-11-03 RX ADMIN — ALPRAZOLAM 0.5 MG: 0.5 TABLET ORAL at 23:37

## 2024-11-03 RX ADMIN — ACETAMINOPHEN 650 MG: 325 TABLET ORAL at 16:25

## 2024-11-03 RX ADMIN — AMPICILLIN SODIUM AND SULBACTAM SODIUM 3000 MG: 2; 1 INJECTION, POWDER, FOR SOLUTION INTRAMUSCULAR; INTRAVENOUS at 22:11

## 2024-11-03 RX ADMIN — AZITHROMYCIN DIHYDRATE 500 MG: 250 TABLET ORAL at 21:43

## 2024-11-03 RX ADMIN — MORPHINE SULFATE 15 MG: 15 TABLET, FILM COATED, EXTENDED RELEASE ORAL at 21:41

## 2024-11-03 RX ADMIN — GUAIFENESIN AND DEXTROMETHORPHAN 10 ML: 100; 10 SYRUP ORAL at 21:43

## 2024-11-03 ASSESSMENT — PAIN DESCRIPTION - ONSET: ONSET: GRADUAL

## 2024-11-03 ASSESSMENT — PAIN SCALES - GENERAL
PAINLEVEL_OUTOF10: 8
PAINLEVEL_OUTOF10: 8

## 2024-11-03 ASSESSMENT — LIFESTYLE VARIABLES
HOW MANY STANDARD DRINKS CONTAINING ALCOHOL DO YOU HAVE ON A TYPICAL DAY: PATIENT DOES NOT DRINK
HOW OFTEN DO YOU HAVE A DRINK CONTAINING ALCOHOL: NEVER

## 2024-11-03 ASSESSMENT — PAIN DESCRIPTION - PAIN TYPE: TYPE: CHRONIC PAIN;ACUTE PAIN

## 2024-11-03 ASSESSMENT — PAIN DESCRIPTION - DESCRIPTORS: DESCRIPTORS: ACHING

## 2024-11-03 ASSESSMENT — PAIN DESCRIPTION - LOCATION
LOCATION: BACK;HEAD
LOCATION: BACK;HEAD;NECK

## 2024-11-03 NOTE — ED PROVIDER NOTES
[facility] EMERGENCY DEPARTMENT  EMERGENCY DEPARTMENT ENCOUNTER        Pt Name: Niru Chaves  MRN: [id number]  Birthdate 1960  Date of evaluation: 11/3/2024  Provider: Nayely Bailey MD  PCP: Crystal Paulino MD  Note Started: 1:30 PM EST 11/3/24    CHIEF COMPLAINT       Chief Complaint   Patient presents with    Dizziness     Fell yesterday. + hit head, - LOC, + eliquis. Complaints of a headache       HISTORY OF PRESENT ILLNESS: 1 or more Elements   History From: Patient    Limitations to history : None    Niru Chaves is a 64 y.o. female who presents for 2 syncopal episodes as well as fall and head trauma.  The patient states she felt dizzy yesterday at the time of her fall.  She states dizziness has been persistent since then.  She described as lightheaded and fell to the ground with intermittent room spinning.  She did hit her head.  She is on Eliquis. She denies chest pain, shortness of breath, abdominal pain, nausea, vomiting, weakness of extremities, urinary symptoms, neck pain    Nursing Notes were all reviewed and agreed with or any disagreements were addressed in the HPI.        REVIEW OF EXTERNAL NOTE :       The patient was last seen and admitted on 9/22/2024 for pulmonary embolism, acute DVT, chest pain, CHF, shortness of breath.  She is on Eliquis    REVIEW OF SYSTEMS :           Positives and Pertinent negatives as per HPI.     SURGICAL HISTORY   History reviewed. No pertinent surgical history.    CURRENTMEDICATIONS       Previous Medications    ALBUTEROL SULFATE HFA (PROVENTIL;VENTOLIN;PROAIR) 108 (90 BASE) MCG/ACT INHALER    Inhale 2 puffs into the lungs every 6 hours as needed    ALPRAZOLAM (XANAX) 0.5 MG TABLET    Take 1 tablet by mouth 2 times daily as needed for Anxiety.    AMLODIPINE (NORVASC) 5 MG TABLET    Take 1 tablet by mouth daily    APIXABAN (ELIQUIS) 5 MG TABS TABLET    Take 2 tablets by mouth 2 times daily for 4 days, THEN 1 tablet 2 times  costophrenic angle and left lower lobe infiltrate.  CT head shows no acute intracranial abnormality.  There is an area of low-attenuation within the posterior aspect of the right frontal lobe extending to the right central Asimia WAL concerning for old infarct.  No mass effect or significant vasogenic edema.  The patient was given Zofran as well as 1 L bolus of fluid.  She was treated for her pneumonia with Rocephin and doxycycline.  She was given Tylenol for a mild headache that developed.  Vitals have remained stable.  Lab and imaging results discussed.  Patient is agreeable to admission.  The case was discussed with the hospitalist who agrees to accept for admission.      CONSULTS: (Who and What was discussed)  None      I am the Primary Clinician of Record.    FINAL IMPRESSION      1. Pneumonia of both lungs due to infectious organism, unspecified part of lung    2. Syncope and collapse    3. Hypokalemia    4. Hypoglycemia          DISPOSITION/PLAN     DISPOSITION Admitted 11/03/2024 04:59:27 PM      PATIENT REFERRED TO:  No follow-up provider specified.    DISCHARGE MEDICATIONS:  New Prescriptions    No medications on file       DISCONTINUED MEDICATIONS:  Discontinued Medications    No medications on file              (Please note that portions of this note were completed with a voice recognition program.  Efforts were made to edit the dictations but occasionally words are mis-transcribed.)    Nayely Bailey MD (electronically signed)

## 2024-11-04 LAB
ANION GAP SERPL CALCULATED.3IONS-SCNC: 10 MMOL/L (ref 7–16)
B PARAP IS1001 DNA NPH QL NAA+NON-PROBE: NOT DETECTED
B PERT DNA SPEC QL NAA+PROBE: NOT DETECTED
BASOPHILS # BLD: 0.01 K/UL (ref 0–0.2)
BASOPHILS NFR BLD: 0 % (ref 0–2)
BUN SERPL-MCNC: 11 MG/DL (ref 6–23)
C PNEUM DNA NPH QL NAA+NON-PROBE: NOT DETECTED
CALCIUM SERPL-MCNC: 8.6 MG/DL (ref 8.6–10.2)
CHLORIDE SERPL-SCNC: 106 MMOL/L (ref 98–107)
CO2 SERPL-SCNC: 24 MMOL/L (ref 22–29)
CREAT SERPL-MCNC: 0.9 MG/DL (ref 0.5–1)
EOSINOPHIL # BLD: 0.22 K/UL (ref 0.05–0.5)
EOSINOPHILS RELATIVE PERCENT: 5 % (ref 0–6)
ERYTHROCYTE [DISTWIDTH] IN BLOOD BY AUTOMATED COUNT: 13.6 % (ref 11.5–15)
FLUAV RNA NPH QL NAA+NON-PROBE: NOT DETECTED
FLUBV RNA NPH QL NAA+NON-PROBE: NOT DETECTED
GFR, ESTIMATED: 73 ML/MIN/1.73M2
GLUCOSE SERPL-MCNC: 105 MG/DL (ref 74–99)
HADV DNA NPH QL NAA+NON-PROBE: NOT DETECTED
HCOV 229E RNA NPH QL NAA+NON-PROBE: NOT DETECTED
HCOV HKU1 RNA NPH QL NAA+NON-PROBE: NOT DETECTED
HCOV NL63 RNA NPH QL NAA+NON-PROBE: NOT DETECTED
HCOV OC43 RNA NPH QL NAA+NON-PROBE: NOT DETECTED
HCT VFR BLD AUTO: 34.7 % (ref 34–48)
HGB BLD-MCNC: 11.7 G/DL (ref 11.5–15.5)
HMPV RNA NPH QL NAA+NON-PROBE: NOT DETECTED
HPIV1 RNA NPH QL NAA+NON-PROBE: NOT DETECTED
HPIV2 RNA NPH QL NAA+NON-PROBE: NOT DETECTED
HPIV3 RNA NPH QL NAA+NON-PROBE: NOT DETECTED
HPIV4 RNA NPH QL NAA+NON-PROBE: NOT DETECTED
IMM GRANULOCYTES # BLD AUTO: <0.03 K/UL (ref 0–0.58)
IMM GRANULOCYTES NFR BLD: 0 % (ref 0–5)
LACTATE BLDV-SCNC: 1.2 MMOL/L (ref 0.5–2.2)
LYMPHOCYTES NFR BLD: 1.31 K/UL (ref 1.5–4)
LYMPHOCYTES RELATIVE PERCENT: 29 % (ref 20–42)
M PNEUMO DNA NPH QL NAA+NON-PROBE: NOT DETECTED
MAGNESIUM SERPL-MCNC: 1.7 MG/DL (ref 1.6–2.6)
MCH RBC QN AUTO: 28.7 PG (ref 26–35)
MCHC RBC AUTO-ENTMCNC: 33.7 G/DL (ref 32–34.5)
MCV RBC AUTO: 85.3 FL (ref 80–99.9)
MONOCYTES NFR BLD: 0.42 K/UL (ref 0.1–0.95)
MONOCYTES NFR BLD: 9 % (ref 2–12)
NEUTROPHILS NFR BLD: 56 % (ref 43–80)
NEUTS SEG NFR BLD: 2.55 K/UL (ref 1.8–7.3)
PHOSPHATE SERPL-MCNC: 3.9 MG/DL (ref 2.5–4.5)
PLATELET # BLD AUTO: 265 K/UL (ref 130–450)
PMV BLD AUTO: 8.9 FL (ref 7–12)
POTASSIUM SERPL-SCNC: 3.1 MMOL/L (ref 3.5–5)
PROCALCITONIN SERPL-MCNC: 0.05 NG/ML (ref 0–0.08)
RBC # BLD AUTO: 4.07 M/UL (ref 3.5–5.5)
RSV RNA NPH QL NAA+NON-PROBE: NOT DETECTED
RV+EV RNA NPH QL NAA+NON-PROBE: NOT DETECTED
SARS-COV-2 RNA NPH QL NAA+NON-PROBE: NOT DETECTED
SODIUM SERPL-SCNC: 140 MMOL/L (ref 132–146)
SPECIMEN DESCRIPTION: NORMAL
WBC OTHER # BLD: 4.5 K/UL (ref 4.5–11.5)

## 2024-11-04 PROCEDURE — 6370000000 HC RX 637 (ALT 250 FOR IP): Performed by: FAMILY MEDICINE

## 2024-11-04 PROCEDURE — 94664 DEMO&/EVAL PT USE INHALER: CPT

## 2024-11-04 PROCEDURE — 84145 PROCALCITONIN (PCT): CPT

## 2024-11-04 PROCEDURE — 2060000000 HC ICU INTERMEDIATE R&B

## 2024-11-04 PROCEDURE — 6370000000 HC RX 637 (ALT 250 FOR IP): Performed by: INTERNAL MEDICINE

## 2024-11-04 PROCEDURE — 83735 ASSAY OF MAGNESIUM: CPT

## 2024-11-04 PROCEDURE — 99232 SBSQ HOSP IP/OBS MODERATE 35: CPT | Performed by: FAMILY MEDICINE

## 2024-11-04 PROCEDURE — 83605 ASSAY OF LACTIC ACID: CPT

## 2024-11-04 PROCEDURE — 36415 COLL VENOUS BLD VENIPUNCTURE: CPT

## 2024-11-04 PROCEDURE — 80048 BASIC METABOLIC PNL TOTAL CA: CPT

## 2024-11-04 PROCEDURE — 6360000002 HC RX W HCPCS: Performed by: FAMILY MEDICINE

## 2024-11-04 PROCEDURE — 84100 ASSAY OF PHOSPHORUS: CPT

## 2024-11-04 PROCEDURE — 2580000003 HC RX 258: Performed by: FAMILY MEDICINE

## 2024-11-04 PROCEDURE — P9047 ALBUMIN (HUMAN), 25%, 50ML: HCPCS | Performed by: FAMILY MEDICINE

## 2024-11-04 PROCEDURE — 92610 EVALUATE SWALLOWING FUNCTION: CPT

## 2024-11-04 PROCEDURE — 85025 COMPLETE CBC W/AUTO DIFF WBC: CPT

## 2024-11-04 PROCEDURE — 94640 AIRWAY INHALATION TREATMENT: CPT

## 2024-11-04 PROCEDURE — 97161 PT EVAL LOW COMPLEX 20 MIN: CPT | Performed by: PHYSICAL THERAPIST

## 2024-11-04 RX ORDER — MORPHINE SULFATE 4 MG/ML
4 INJECTION, SOLUTION INTRAMUSCULAR; INTRAVENOUS ONCE
Status: COMPLETED | OUTPATIENT
Start: 2024-11-04 | End: 2024-11-04

## 2024-11-04 RX ORDER — MAGNESIUM SULFATE IN WATER 40 MG/ML
2000 INJECTION, SOLUTION INTRAVENOUS ONCE
Status: COMPLETED | OUTPATIENT
Start: 2024-11-04 | End: 2024-11-04

## 2024-11-04 RX ORDER — SODIUM CHLORIDE, SODIUM LACTATE, POTASSIUM CHLORIDE, CALCIUM CHLORIDE 600; 310; 30; 20 MG/100ML; MG/100ML; MG/100ML; MG/100ML
INJECTION, SOLUTION INTRAVENOUS CONTINUOUS
Status: ACTIVE | OUTPATIENT
Start: 2024-11-04 | End: 2024-11-04

## 2024-11-04 RX ORDER — OXYCODONE AND ACETAMINOPHEN 5; 325 MG/1; MG/1
1 TABLET ORAL EVERY 6 HOURS PRN
Status: DISCONTINUED | OUTPATIENT
Start: 2024-11-04 | End: 2024-11-05 | Stop reason: HOSPADM

## 2024-11-04 RX ORDER — KETOROLAC TROMETHAMINE 30 MG/ML
30 INJECTION, SOLUTION INTRAMUSCULAR; INTRAVENOUS ONCE
Status: COMPLETED | OUTPATIENT
Start: 2024-11-04 | End: 2024-11-04

## 2024-11-04 RX ORDER — TRAMADOL HYDROCHLORIDE 50 MG/1
100 TABLET ORAL EVERY 6 HOURS PRN
Status: DISCONTINUED | OUTPATIENT
Start: 2024-11-04 | End: 2024-11-05 | Stop reason: HOSPADM

## 2024-11-04 RX ORDER — ALBUMIN (HUMAN) 12.5 G/50ML
25 SOLUTION INTRAVENOUS ONCE
Status: COMPLETED | OUTPATIENT
Start: 2024-11-04 | End: 2024-11-04

## 2024-11-04 RX ORDER — POTASSIUM CHLORIDE 1500 MG/1
40 TABLET, EXTENDED RELEASE ORAL 3 TIMES DAILY
Status: COMPLETED | OUTPATIENT
Start: 2024-11-04 | End: 2024-11-04

## 2024-11-04 RX ADMIN — POTASSIUM CHLORIDE 40 MEQ: 1500 TABLET, EXTENDED RELEASE ORAL at 21:04

## 2024-11-04 RX ADMIN — SODIUM CHLORIDE, POTASSIUM CHLORIDE, SODIUM LACTATE AND CALCIUM CHLORIDE: 600; 310; 30; 20 INJECTION, SOLUTION INTRAVENOUS at 17:37

## 2024-11-04 RX ADMIN — FOLIC ACID 1 MG: 1 TABLET ORAL at 09:33

## 2024-11-04 RX ADMIN — APIXABAN 5 MG: 5 TABLET, FILM COATED ORAL at 09:32

## 2024-11-04 RX ADMIN — POTASSIUM CHLORIDE 40 MEQ: 1500 TABLET, EXTENDED RELEASE ORAL at 14:39

## 2024-11-04 RX ADMIN — AMPICILLIN SODIUM AND SULBACTAM SODIUM 3000 MG: 2; 1 INJECTION, POWDER, FOR SOLUTION INTRAMUSCULAR; INTRAVENOUS at 04:10

## 2024-11-04 RX ADMIN — ALPRAZOLAM 0.5 MG: 0.5 TABLET ORAL at 09:34

## 2024-11-04 RX ADMIN — AMPICILLIN SODIUM AND SULBACTAM SODIUM 3000 MG: 2; 1 INJECTION, POWDER, FOR SOLUTION INTRAMUSCULAR; INTRAVENOUS at 21:11

## 2024-11-04 RX ADMIN — SODIUM CHLORIDE, PRESERVATIVE FREE 10 ML: 5 INJECTION INTRAVENOUS at 09:38

## 2024-11-04 RX ADMIN — IPRATROPIUM BROMIDE AND ALBUTEROL SULFATE 1 DOSE: .5; 2.5 SOLUTION RESPIRATORY (INHALATION) at 10:05

## 2024-11-04 RX ADMIN — QUETIAPINE 300 MG: 100 TABLET ORAL at 01:38

## 2024-11-04 RX ADMIN — APIXABAN 5 MG: 5 TABLET, FILM COATED ORAL at 21:04

## 2024-11-04 RX ADMIN — QUETIAPINE 300 MG: 100 TABLET ORAL at 21:04

## 2024-11-04 RX ADMIN — ATORVASTATIN CALCIUM 40 MG: 40 TABLET, FILM COATED ORAL at 21:04

## 2024-11-04 RX ADMIN — TRAMADOL HYDROCHLORIDE 100 MG: 50 TABLET, COATED ORAL at 11:41

## 2024-11-04 RX ADMIN — ALPRAZOLAM 0.5 MG: 0.5 TABLET ORAL at 21:03

## 2024-11-04 RX ADMIN — IPRATROPIUM BROMIDE AND ALBUTEROL SULFATE 1 DOSE: .5; 2.5 SOLUTION RESPIRATORY (INHALATION) at 13:40

## 2024-11-04 RX ADMIN — KETOROLAC TROMETHAMINE 30 MG: 30 INJECTION, SOLUTION INTRAMUSCULAR at 01:37

## 2024-11-04 RX ADMIN — AMPICILLIN SODIUM AND SULBACTAM SODIUM 3000 MG: 2; 1 INJECTION, POWDER, FOR SOLUTION INTRAMUSCULAR; INTRAVENOUS at 09:41

## 2024-11-04 RX ADMIN — TOPIRAMATE 50 MG: 25 TABLET, FILM COATED ORAL at 21:04

## 2024-11-04 RX ADMIN — ONDANSETRON 4 MG: 2 INJECTION, SOLUTION INTRAMUSCULAR; INTRAVENOUS at 21:00

## 2024-11-04 RX ADMIN — MAGNESIUM SULFATE HEPTAHYDRATE 2000 MG: 40 INJECTION, SOLUTION INTRAVENOUS at 11:06

## 2024-11-04 RX ADMIN — GUAIFENESIN AND DEXTROMETHORPHAN 10 ML: 100; 10 SYRUP ORAL at 09:33

## 2024-11-04 RX ADMIN — METOPROLOL SUCCINATE 25 MG: 25 TABLET, FILM COATED, EXTENDED RELEASE ORAL at 09:32

## 2024-11-04 RX ADMIN — OXYCODONE AND ACETAMINOPHEN 1 TABLET: 5; 325 TABLET ORAL at 23:24

## 2024-11-04 RX ADMIN — SODIUM CHLORIDE, PRESERVATIVE FREE 10 ML: 5 INJECTION INTRAVENOUS at 21:01

## 2024-11-04 RX ADMIN — MORPHINE SULFATE 4 MG: 4 INJECTION, SOLUTION INTRAMUSCULAR; INTRAVENOUS at 16:42

## 2024-11-04 RX ADMIN — AZITHROMYCIN DIHYDRATE 500 MG: 250 TABLET ORAL at 21:04

## 2024-11-04 RX ADMIN — MORPHINE SULFATE 15 MG: 15 TABLET, FILM COATED, EXTENDED RELEASE ORAL at 09:34

## 2024-11-04 RX ADMIN — ALBUMIN (HUMAN) 25 G: 0.25 INJECTION, SOLUTION INTRAVENOUS at 16:43

## 2024-11-04 RX ADMIN — AMPICILLIN SODIUM AND SULBACTAM SODIUM 3000 MG: 2; 1 INJECTION, POWDER, FOR SOLUTION INTRAMUSCULAR; INTRAVENOUS at 14:42

## 2024-11-04 RX ADMIN — MORPHINE SULFATE 15 MG: 15 TABLET, FILM COATED, EXTENDED RELEASE ORAL at 21:04

## 2024-11-04 RX ADMIN — ONDANSETRON 4 MG: 2 INJECTION, SOLUTION INTRAMUSCULAR; INTRAVENOUS at 12:59

## 2024-11-04 RX ADMIN — POTASSIUM CHLORIDE 40 MEQ: 1500 TABLET, EXTENDED RELEASE ORAL at 09:32

## 2024-11-04 RX ADMIN — ASPIRIN 81 MG: 81 TABLET, COATED ORAL at 09:33

## 2024-11-04 RX ADMIN — TOPIRAMATE 50 MG: 25 TABLET, FILM COATED ORAL at 09:33

## 2024-11-04 RX ADMIN — ISOSORBIDE MONONITRATE 60 MG: 60 TABLET, EXTENDED RELEASE ORAL at 09:32

## 2024-11-04 RX ADMIN — CLOPIDOGREL BISULFATE 75 MG: 75 TABLET ORAL at 09:34

## 2024-11-04 RX ADMIN — LISINOPRIL 40 MG: 20 TABLET ORAL at 09:32

## 2024-11-04 ASSESSMENT — PAIN SCALES - WONG BAKER
WONGBAKER_NUMERICALRESPONSE: NO HURT
WONGBAKER_NUMERICALRESPONSE: NO HURT

## 2024-11-04 ASSESSMENT — PAIN DESCRIPTION - LOCATION
LOCATION_2: CHEST
LOCATION: BACK;CHEST
LOCATION: BACK;CHEST
LOCATION: GENERALIZED
LOCATION_3: HEAD
LOCATION: BACK
LOCATION: CHEST;BACK
LOCATION: BACK;CHEST
LOCATION: STERNUM
LOCATION: BACK

## 2024-11-04 ASSESSMENT — PAIN SCALES - GENERAL
PAINLEVEL_OUTOF10: 10
PAINLEVEL_OUTOF10: 10
PAINLEVEL_OUTOF10: 9
PAINLEVEL_OUTOF10: 9
PAINLEVEL_OUTOF10: 8
PAINLEVEL_OUTOF10: 9
PAINLEVEL_OUTOF10: 8
PAINLEVEL_OUTOF10: 8
PAINLEVEL_OUTOF10: 9
PAINLEVEL_OUTOF10: 9

## 2024-11-04 ASSESSMENT — PAIN DESCRIPTION - ONSET: ONSET: GRADUAL

## 2024-11-04 ASSESSMENT — PAIN - FUNCTIONAL ASSESSMENT
PAIN_FUNCTIONAL_ASSESSMENT: PREVENTS OR INTERFERES SOME ACTIVE ACTIVITIES AND ADLS
PAIN_FUNCTIONAL_ASSESSMENT_SITE2: PREVENTS OR INTERFERES SOME ACTIVE ACTIVITIES AND ADLS
PAIN_FUNCTIONAL_ASSESSMENT: ACTIVITIES ARE NOT PREVENTED
PAIN_FUNCTIONAL_ASSESSMENT: ACTIVITIES ARE NOT PREVENTED

## 2024-11-04 ASSESSMENT — PAIN DESCRIPTION - ORIENTATION
ORIENTATION_2: MID
ORIENTATION: ANTERIOR;POSTERIOR

## 2024-11-04 ASSESSMENT — PAIN DESCRIPTION - PAIN TYPE: TYPE: ACUTE PAIN;CHRONIC PAIN

## 2024-11-04 ASSESSMENT — PAIN DESCRIPTION - DESCRIPTORS
DESCRIPTORS_2: TIGHTNESS
DESCRIPTORS: ACHING
DESCRIPTORS: ACHING
DESCRIPTORS: STABBING
DESCRIPTORS_3: ACHING
DESCRIPTORS: ACHING

## 2024-11-04 ASSESSMENT — PAIN DESCRIPTION - INTENSITY
RATING_3: 9
RATING_2: 8

## 2024-11-04 ASSESSMENT — PAIN DESCRIPTION - FREQUENCY: FREQUENCY: OTHER (COMMENT)

## 2024-11-04 NOTE — PROGRESS NOTES
Physical Therapy  Physical Therapy Initial Evaluation/Plan of Care    Room #:  0631/0631-01  Patient Name: Niru Chaves  YOB: 1960  MRN: [id number]    Date of Service: 11/4/2024     Tentative placement recommendation: Home with Home Health Physical Therapy  Equipment recommendation: Patient has needed equipment       Evaluating Physical Therapist: Mirza Mireles, PT, DPT #[id number]      Specific Provider Orders/Date/Referring Provider :     11/03/24 2115    PT evaluation and treat  Start:  11/03/24 2115,   End:  11/03/24 2115,   ONE TIME,   Standing Count:  1 Occurrences,   R       Bria Jackson, DO Acknowledge New    Admitting Diagnosis:   Syncope and collapse [R55]  Hypokalemia [E87.6]  Hypoglycemia [E16.2]  Pneumonia of both lungs due to infectious organism, unspecified part of lung [J18.9]      Surgery: none  Visit Diagnoses         Codes    Pneumonia of both lungs due to infectious organism, unspecified part of lung    -  Primary J18.9    Hypokalemia     E87.6    Hypoglycemia     E16.2            Patient Active Problem List   Diagnosis    Shock circulatory    Atherosclerosis of native coronary artery of native heart without angina pectoris    Chronic obstructive pulmonary disease (HCC)    Hypertension    CHF (congestive heart failure) (HCC)    Hyperlipidemia    Chronic pain    DJD (degenerative joint disease)    Hx of degenerative disc disease    History of MI (myocardial infarction)    Gastroparesis    Anxiety    History of CVA (cerebrovascular accident)    Pulmonary embolism without acute cor pulmonale (HCC)    Deep venous thrombosis (HCC)    Chest pain    Shortness of breath    Smoker    Syncope and collapse    Pneumonia of right lower lobe due to infectious organism        ASSESSMENT of Current Deficits Patient exhibits decreased strength, balance, and endurance impairing functional mobility, transfers, gait , gait distance, and tolerance to activity. Pt mildly unsteady with no LOB, dizziness,

## 2024-11-04 NOTE — CARE COORDINATION
Case Management Assessment  Initial Evaluation    Date/Time of Evaluation: 11/4/2024 4:44 PM  Assessment Completed by: CRYSTAL Owen    If patient is discharged prior to next notation, then this note serves as note for discharge by case management.    Patient Name: Niru Chaves                   YOB: 1960  Diagnosis: Syncope and collapse [R55]  Hypokalemia [E87.6]  Hypoglycemia [E16.2]  Pneumonia of both lungs due to infectious organism, unspecified part of lung [J18.9]                   Date / Time: 11/3/2024  1:04 PM    Patient Admission Status: Inpatient   Readmission Risk (Low < 19, Mod (19-27), High > 27): Readmission Risk Score: 16.3    Current PCP: Crystal Paulino MD  PCP verified by CM? Yes    Chart Reviewed: Yes      History Provided by: Patient  Patient Orientation: Alert and Oriented, Person, Place, Situation    Patient Cognition: Alert    Hospitalization in the last 30 days (Readmission):  No    If yes, Readmission Assessment in  Navigator will be completed.    Advance Directives:      Code Status: Full Code   Patient's Primary Decision Maker is: Named in Scanned ACP Document    Primary Decision Maker: Abdiaziz Arcos - Brother/Sister - [phone]    Discharge Planning:    Patient lives with: Family Members Type of Home: House  Primary Care Giver: Self  Patient Support Systems include: Family Members   Current Financial resources:    Current community resources:    Current services prior to admission: None            Current DME:              Type of Home Care services:  None    ADLS  Prior functional level: Independent in ADLs/IADLs  Current functional level: Independent in ADLs/IADLs    PT AM-PAC: 17 /24  OT AM-PAC:   /24    Family can provide assistance at DC: No  Would you like Case Management to discuss the discharge plan with any other family members/significant others, and if so, who? No  Plans to Return to Present Housing: Yes  Other Identified Issues/Barriers to RETURNING to

## 2024-11-04 NOTE — PROGRESS NOTES
[facility] Hospitalist Progress Note    Admitting Date and Time: 11/3/2024  1:04 PM  Admit Dx: Syncope and collapse [R55]  Hypokalemia [E87.6]  Hypoglycemia [E16.2]  Pneumonia of both lungs due to infectious organism, unspecified part of lung [J18.9]      Assessment:    Active Problems:    Syncope and collapse    Pneumonia of right lower lobe due to infectious organism  Resolved Problems:    * No resolved hospital problems. *      Plan:  11/3/2024  1.  Syncope --x 2 at home; could be due to exertional hypoxemia from pneumonia, also consider due to polypharmacy  -Check exertional pulse ox  -Treat the pneumonia  -Replace potassium  -Orthostatics     2.  Right lower lobe pneumonia, consider aspiration pneumonia  -Give Unasyn and azithromycin  -Blood cultures  -Respiratory therapy  -Robitussin DM as needed     3.  CHF --stable, euvolemic     4.  History of DVT and PE in September 2024 --no chest pain, continue Eliquis  -Telemetry     5.  COPD --appears stable with no cough no wheezing  -DuoNebs every 4 hours while awake while here  -If any coughing or wheezing then would start steroids     6.  Hypertension --controlled  -Continue lisinopril 40 mg daily  -Continue Toprol 25 mg XL daily     7.  Coronary artery disease --stable --troponins were 10 and 9 in the emergency room  Continue aspirin and Plavix  Continue Lipitor 40 mg nightly     Check d-dimer to see if syncope is due to recurrence of PE.     11/4/2024  --abx for aspiration pna  --replace K  --if concerned for aspiration, will order slp eval  --bph  --bronchodilators via nebs  --chf diet for now  --check orthostatics  --control pain?  --vitals currently stable  --replace mag  --resp panel negative  --d dimer 207, low suspicion for clot kathy since she has been stable on eliquis and compliant w/  After rounds: Several calls received about patient's pain control.  Ultimately her pressures are very low which makes medicating her further unsafe, will give a

## 2024-11-04 NOTE — PLAN OF CARE
Problem: Chronic Conditions and Co-morbidities  Goal: Patient's chronic conditions and co-morbidity symptoms are monitored and maintained or improved  Outcome: Progressing  Flowsheets (Taken 11/3/2024 1932)  Care Plan - Patient's Chronic Conditions and Co-Morbidity Symptoms are Monitored and Maintained or Improved: Monitor and assess patient's chronic conditions and comorbid symptoms for stability, deterioration, or improvement     Problem: Discharge Planning  Goal: Discharge to home or other facility with appropriate resources  Outcome: Progressing  Flowsheets (Taken 11/3/2024 1932)  Discharge to home or other facility with appropriate resources: Identify barriers to discharge with patient and caregiver     Problem: Pain  Goal: Verbalizes/displays adequate comfort level or baseline comfort level  Outcome: Progressing     Problem: Safety - Adult  Goal: Free from fall injury  Outcome: Progressing

## 2024-11-04 NOTE — PROGRESS NOTES
4 Eyes Skin Assessment     NAME:  Niru Chaves  YOB: 1960  MEDICAL RECORD NUMBER:  [id number]    The patient is being assessed for  Admission    I agree that at least one RN has performed a thorough Head to Toe Skin Assessment on the patient. ALL assessment sites listed below have been assessed.      Areas assessed by both nurses:    Head, Face, Ears, Shoulders, Back, Chest, Arms, Elbows, Hands, Sacrum. Buttock, Coccyx, Ischium, and Legs. Feet and Heels        Does the Patient have a Wound? Yes wound(s) were present on assessment. LDA wound assessment was Initiated and completed by RN  Abrasions on lips and bridge of nose, bug bites on ankles       Adi Prevention initiated by RN: No  Wound Care Orders initiated by RN: No    Pressure Injury (Stage 3,4, Unstageable, DTI, NWPT, and Complex wounds) if present, place Wound referral order by RN under : No    New Ostomies, if present place, Ostomy referral order under : No     Nurse 1 eSignature: Electronically signed by Lea Chaudhary RN on 11/4/24 at 2:19 AM EST    **SHARE this note so that the co-signing nurse can place an eSignature**    Nurse 2 eSignature: Electronically signed by Collette Hansen RN on 11/4/24 at 2:29 AM EST

## 2024-11-04 NOTE — PROGRESS NOTES
SPEECH/LANGUAGE PATHOLOGY  CLINICAL ASSESSMENT OF SWALLOWING FUNCTION   and PLAN OF CARE      PATIENT NAME:  Niru Chaves  (female)     MRN:  [id number]    :  1960  (64 y.o.)  STATUS:  Inpatient: Room 31/0631-    TODAY'S DATE:  2024  ORDER DATE, DESCRIPTION AND REFERRING PROVIDER: 24 0815    SLP swallowing-dysphagia evaluation and treatment  Start:  24,   End:  24,   ONE TIME,   Standing Count:  1 Occurrences,   R       Tamara Medina E, DO  REASON FOR REFERRAL: R/O aspiration   EVALUATING THERAPIST: ADELA Barajas                 RESULTS:    DYSPHAGIA DIAGNOSIS:   normal swallow function      DIET RECOMMENDATIONS:  Regular consistency solids (IDDSI level 7) with  thin liquids (IDDSI level 0)     FEEDING RECOMMENDATIONS:     Assistance level:  Encourage self-feeding as function allows      Compensatory strategies recommended: Thorough oral care to prevent colonization of oral bacteria   Upright in bed/ chair as tolerated  Fully alert for all PO      Discussed recommendations with:   floor nurse (patient nurse and charge nurse unavailable)    SPEECH THERAPY  PLAN OF CARE   The dysphagia POC is established based on physician order, dysphagia diagnosis and results of clinical assessment     Dysphagia therapy is not recommended following s session due to independent with implementation of strategies/modifications.     Conditions Requiring Skilled Therapeutic Intervention for dysphagia:    Swallow within functional limits per assessment     Specific dysphagia interventions to include:     Not applicable no therapy warranted     Specific instructions for next treatment:  not applicable   Patient Treatment Goals:    Short Term Goals:  Not applicable no therapy warranted     Long Term Goals:   Not applicable no therapy warranted      Patient/family Goal:    not applicable    Plan of care discussed with Patient   The Patient understand(s) the diagnosis, prognosis and

## 2024-11-04 NOTE — PROGRESS NOTES
Spiritual Health History and Assessment/Progress Note  [facility]    (P) Loneliness/Social Isolation,  , (P) Grief and loss,      Name: Niru Chaves MRN: [id number]    Age: 64 y.o.     Sex: female   Language: English   Confucianist: Hoahaoism   <principal problem not specified>     Date: 11/4/2024                           Spiritual Assessment began in [facility]        Referral/Consult From: (P) Rounding   Encounter Overview/Reason: (P) Loneliness/Social Isolation  Service Provided For: (P) Patient    Felipa, Belief, Meaning:   Patient identifies as spiritual  Family/Friends No family/friends present      Importance and Influence:  Patient has spiritual/personal beliefs that influence decisions regarding their health  Family/Friends No family/friends present    Community:  Patient Other: Niru is grieving the loss of her sister and has been other peoples care giver. She finds support form her dogs which she shared pictures with me.   Family/Friends No family/friends present    Assessment and Plan of Care:     Patient Interventions include: Facilitated expression of thoughts and feelings, Explored spiritual coping/struggle/distress, and Affirmed coping skills/support systems  Family/Friends Interventions include: No family/friends present    Patient Plan of Care: Spiritual Care available upon further referral  Family/Friends Plan of Care: Spiritual Care available upon further referral    Electronically signed by ELOY Boggs on 11/4/2024 at 9:29 AM

## 2024-11-04 NOTE — H&P
[facility] Hospitalist Group History and Physical      CHIEF COMPLAINT:    Passed out    History of Present Illness:    64-year-old female with a past medical history of DVT and PE admitted in September 2024 for such, coronary artery disease, CHF, COPD who presented to the emergency room after passing out today.  She walked into her bedroom and just passed out suddenly.  She had no prodromal symptoms.  She also passed out 2 days ago when she hit the right side of her upper lip and has a hematoma there.  She has been taking all of her medications as prescribed.  She denies recent illnesses.  She has not been short of breath or had any coughing or wheezing.  Not sure why she passed out.  No one in her household is ill.  She has no vomiting or diarrhea.  Her appetite has been down.  Drinking okay.  She has a headache.  She denies any focal weakness or any numbness tingling.  No double or blurry vision.  No hearing changes.    She is on Xanax as needed and MS Contin.    In the ER she got ceftriaxone and doxycycline IV x 1.  She also got Toradol for her headache which did not help.  She also got Tylenol 650 mg p.o. x 1 which also did not help with her headache.  Also received Zofran 4 mg IV once.  Also got Effer-K light 50 mEq once to replace a potassium of 3.3.    Informant(s) for H&P:  Patient and chart    REVIEW OF SYSTEMS:  A comprehensive review of systems was negative except for: what is in the HPI  Positive headache, no ear or eye symptoms, no pharyngitis, no rhinorrhea, no neck pain, no chest pain, no shortness of breath, no palpitations, no presyncope, positive syncope, no abdominal pain, no nausea or vomiting, no diarrhea, no urinary symptoms, no vertigo, no rashes, no pruritus, no anorexia, no fevers, no chills, no sweats, no fatigue, no focal numbness, no tingling, no weakness, no hematemesis, no hematochezia, no cough, no wheezing    PMH:  Past Medical History:   Diagnosis Date    CAD (coronary artery

## 2024-11-05 ENCOUNTER — APPOINTMENT (OUTPATIENT)
Dept: GENERAL RADIOLOGY | Age: 64
DRG: 194 | End: 2024-11-05
Payer: COMMERCIAL

## 2024-11-05 VITALS
HEIGHT: 68 IN | SYSTOLIC BLOOD PRESSURE: 118 MMHG | OXYGEN SATURATION: 98 % | BODY MASS INDEX: 23.22 KG/M2 | HEART RATE: 73 BPM | DIASTOLIC BLOOD PRESSURE: 75 MMHG | RESPIRATION RATE: 20 BRPM | TEMPERATURE: 97.8 F | WEIGHT: 153.2 LBS

## 2024-11-05 LAB
ALBUMIN SERPL-MCNC: 3.5 G/DL (ref 3.5–5.2)
ALP SERPL-CCNC: 44 U/L (ref 35–104)
ALT SERPL-CCNC: <5 U/L (ref 0–32)
ANION GAP SERPL CALCULATED.3IONS-SCNC: 12 MMOL/L (ref 7–16)
AST SERPL-CCNC: 8 U/L (ref 0–31)
BASOPHILS # BLD: 0.01 K/UL (ref 0–0.2)
BASOPHILS NFR BLD: 0 % (ref 0–2)
BILIRUB SERPL-MCNC: 0.3 MG/DL (ref 0–1.2)
BUN SERPL-MCNC: 13 MG/DL (ref 6–23)
CALCIUM SERPL-MCNC: 8.7 MG/DL (ref 8.6–10.2)
CHLORIDE SERPL-SCNC: 110 MMOL/L (ref 98–107)
CO2 SERPL-SCNC: 20 MMOL/L (ref 22–29)
CREAT SERPL-MCNC: 1 MG/DL (ref 0.5–1)
EKG ATRIAL RATE: 101 BPM
EKG P AXIS: 38 DEGREES
EKG P-R INTERVAL: 148 MS
EKG Q-T INTERVAL: 338 MS
EKG QRS DURATION: 82 MS
EKG QTC CALCULATION (BAZETT): 438 MS
EKG R AXIS: 3 DEGREES
EKG T AXIS: 55 DEGREES
EKG VENTRICULAR RATE: 101 BPM
EOSINOPHIL # BLD: 0.34 K/UL (ref 0.05–0.5)
EOSINOPHILS RELATIVE PERCENT: 6 % (ref 0–6)
ERYTHROCYTE [DISTWIDTH] IN BLOOD BY AUTOMATED COUNT: 14.6 % (ref 11.5–15)
GFR, ESTIMATED: 62 ML/MIN/1.73M2
GLUCOSE SERPL-MCNC: 129 MG/DL (ref 74–99)
HCT VFR BLD AUTO: 33.8 % (ref 34–48)
HGB BLD-MCNC: 10.7 G/DL (ref 11.5–15.5)
IMM GRANULOCYTES # BLD AUTO: <0.03 K/UL (ref 0–0.58)
IMM GRANULOCYTES NFR BLD: 0 % (ref 0–5)
L PNEUMO1 AG UR QL IA.RAPID: NEGATIVE
LYMPHOCYTES NFR BLD: 1.18 K/UL (ref 1.5–4)
LYMPHOCYTES RELATIVE PERCENT: 20 % (ref 20–42)
MCH RBC QN AUTO: 28.9 PG (ref 26–35)
MCHC RBC AUTO-ENTMCNC: 31.7 G/DL (ref 32–34.5)
MCV RBC AUTO: 91.4 FL (ref 80–99.9)
MONOCYTES NFR BLD: 0.45 K/UL (ref 0.1–0.95)
MONOCYTES NFR BLD: 8 % (ref 2–12)
NEUTROPHILS NFR BLD: 65 % (ref 43–80)
NEUTS SEG NFR BLD: 3.79 K/UL (ref 1.8–7.3)
PLATELET # BLD AUTO: 241 K/UL (ref 130–450)
PMV BLD AUTO: 9.2 FL (ref 7–12)
POTASSIUM SERPL-SCNC: 4 MMOL/L (ref 3.5–5)
PROT SERPL-MCNC: 5.3 G/DL (ref 6.4–8.3)
RBC # BLD AUTO: 3.7 M/UL (ref 3.5–5.5)
S PNEUM AG SPEC QL: NEGATIVE
SODIUM SERPL-SCNC: 142 MMOL/L (ref 132–146)
SPECIMEN SOURCE: NORMAL
WBC OTHER # BLD: 5.8 K/UL (ref 4.5–11.5)

## 2024-11-05 PROCEDURE — 85025 COMPLETE CBC W/AUTO DIFF WBC: CPT

## 2024-11-05 PROCEDURE — 6360000002 HC RX W HCPCS: Performed by: FAMILY MEDICINE

## 2024-11-05 PROCEDURE — 6370000000 HC RX 637 (ALT 250 FOR IP): Performed by: FAMILY MEDICINE

## 2024-11-05 PROCEDURE — 6370000000 HC RX 637 (ALT 250 FOR IP): Performed by: INTERNAL MEDICINE

## 2024-11-05 PROCEDURE — 80053 COMPREHEN METABOLIC PANEL: CPT

## 2024-11-05 PROCEDURE — 36415 COLL VENOUS BLD VENIPUNCTURE: CPT

## 2024-11-05 PROCEDURE — 93010 ELECTROCARDIOGRAM REPORT: CPT | Performed by: INTERNAL MEDICINE

## 2024-11-05 PROCEDURE — 2580000003 HC RX 258: Performed by: FAMILY MEDICINE

## 2024-11-05 PROCEDURE — 71046 X-RAY EXAM CHEST 2 VIEWS: CPT

## 2024-11-05 PROCEDURE — 99239 HOSP IP/OBS DSCHRG MGMT >30: CPT | Performed by: FAMILY MEDICINE

## 2024-11-05 PROCEDURE — 94640 AIRWAY INHALATION TREATMENT: CPT

## 2024-11-05 RX ORDER — AZITHROMYCIN 500 MG/1
500 TABLET, FILM COATED ORAL EVERY 24 HOURS
Qty: 1 TABLET | Refills: 0 | Status: SHIPPED | OUTPATIENT
Start: 2024-11-05 | End: 2024-11-06

## 2024-11-05 RX ORDER — POTASSIUM CHLORIDE 1500 MG/1
40 TABLET, EXTENDED RELEASE ORAL 3 TIMES DAILY
Status: DISCONTINUED | OUTPATIENT
Start: 2024-11-05 | End: 2024-11-05 | Stop reason: HOSPADM

## 2024-11-05 RX ORDER — OXYCODONE AND ACETAMINOPHEN 5; 325 MG/1; MG/1
1 TABLET ORAL EVERY 6 HOURS PRN
Qty: 20 TABLET | Refills: 0 | Status: SHIPPED | OUTPATIENT
Start: 2024-11-05 | End: 2024-11-10

## 2024-11-05 RX ADMIN — OXYCODONE AND ACETAMINOPHEN 1 TABLET: 5; 325 TABLET ORAL at 12:13

## 2024-11-05 RX ADMIN — OXYCODONE AND ACETAMINOPHEN 1 TABLET: 5; 325 TABLET ORAL at 06:04

## 2024-11-05 RX ADMIN — AMPICILLIN SODIUM AND SULBACTAM SODIUM 3000 MG: 2; 1 INJECTION, POWDER, FOR SOLUTION INTRAMUSCULAR; INTRAVENOUS at 03:26

## 2024-11-05 RX ADMIN — IPRATROPIUM BROMIDE AND ALBUTEROL SULFATE 1 DOSE: .5; 2.5 SOLUTION RESPIRATORY (INHALATION) at 09:37

## 2024-11-05 RX ADMIN — METOPROLOL SUCCINATE 25 MG: 25 TABLET, FILM COATED, EXTENDED RELEASE ORAL at 07:27

## 2024-11-05 RX ADMIN — SODIUM CHLORIDE, PRESERVATIVE FREE 10 ML: 5 INJECTION INTRAVENOUS at 07:28

## 2024-11-05 RX ADMIN — LISINOPRIL 40 MG: 20 TABLET ORAL at 07:27

## 2024-11-05 RX ADMIN — APIXABAN 5 MG: 5 TABLET, FILM COATED ORAL at 07:27

## 2024-11-05 RX ADMIN — ASPIRIN 81 MG: 81 TABLET, COATED ORAL at 07:28

## 2024-11-05 RX ADMIN — ALPRAZOLAM 0.5 MG: 0.5 TABLET ORAL at 09:14

## 2024-11-05 RX ADMIN — AMPICILLIN SODIUM AND SULBACTAM SODIUM 3000 MG: 2; 1 INJECTION, POWDER, FOR SOLUTION INTRAMUSCULAR; INTRAVENOUS at 09:23

## 2024-11-05 RX ADMIN — CLOPIDOGREL BISULFATE 75 MG: 75 TABLET ORAL at 07:27

## 2024-11-05 RX ADMIN — MORPHINE SULFATE 15 MG: 15 TABLET, FILM COATED, EXTENDED RELEASE ORAL at 09:14

## 2024-11-05 RX ADMIN — FOLIC ACID 1 MG: 1 TABLET ORAL at 07:27

## 2024-11-05 RX ADMIN — ISOSORBIDE MONONITRATE 60 MG: 60 TABLET, EXTENDED RELEASE ORAL at 07:27

## 2024-11-05 RX ADMIN — IPRATROPIUM BROMIDE AND ALBUTEROL SULFATE 1 DOSE: .5; 2.5 SOLUTION RESPIRATORY (INHALATION) at 06:54

## 2024-11-05 RX ADMIN — POTASSIUM CHLORIDE 40 MEQ: 1500 TABLET, EXTENDED RELEASE ORAL at 09:13

## 2024-11-05 RX ADMIN — TOPIRAMATE 50 MG: 25 TABLET, FILM COATED ORAL at 07:27

## 2024-11-05 RX ADMIN — POTASSIUM CHLORIDE 40 MEQ: 1500 TABLET, EXTENDED RELEASE ORAL at 13:49

## 2024-11-05 ASSESSMENT — PAIN SCALES - GENERAL
PAINLEVEL_OUTOF10: 5
PAINLEVEL_OUTOF10: 7
PAINLEVEL_OUTOF10: 8
PAINLEVEL_OUTOF10: 5
PAINLEVEL_OUTOF10: 8
PAINLEVEL_OUTOF10: 8
PAINLEVEL_OUTOF10: 7
PAINLEVEL_OUTOF10: 6

## 2024-11-05 ASSESSMENT — PAIN DESCRIPTION - LOCATION
LOCATION: BACK;CHEST
LOCATION: CHEST;BACK
LOCATION: GENERALIZED
LOCATION: CHEST;BACK

## 2024-11-05 ASSESSMENT — PAIN - FUNCTIONAL ASSESSMENT
PAIN_FUNCTIONAL_ASSESSMENT: ACTIVITIES ARE NOT PREVENTED

## 2024-11-05 ASSESSMENT — PAIN DESCRIPTION - DESCRIPTORS
DESCRIPTORS: ACHING
DESCRIPTORS: SHARP;STABBING
DESCRIPTORS: ACHING

## 2024-11-05 ASSESSMENT — PAIN DESCRIPTION - PAIN TYPE: TYPE: ACUTE PAIN;CHRONIC PAIN

## 2024-11-05 ASSESSMENT — PAIN DESCRIPTION - ORIENTATION: ORIENTATION: ANTERIOR;LOWER

## 2024-11-05 ASSESSMENT — PAIN SCALES - WONG BAKER: WONGBAKER_NUMERICALRESPONSE: NO HURT

## 2024-11-05 NOTE — PROGRESS NOTES
CLINICAL PHARMACY NOTE: MEDS TO BEDS    Total # of Prescriptions Filled: 3   The following medications were delivered to the patient:  Percocet 5/325 mg  Augmentin 875/125 mg  Azithromycin 500 mg    Additional Documentation:

## 2024-11-05 NOTE — PROGRESS NOTES
Talked with Dr. Jet Larson regarding patients c/o severe pain to back and chest region and that ms contin is not working for her and that ultram makes her sick. New order received.

## 2024-11-05 NOTE — PROGRESS NOTES
Pulse ox was 100% on room air at rest.ambulated in hallway.pulse ox remains 100% on room air while ambulating.

## 2024-11-05 NOTE — PLAN OF CARE
Problem: Chronic Conditions and Co-morbidities  Goal: Patient's chronic conditions and co-morbidity symptoms are monitored and maintained or improved  11/5/2024 1018 by Sandra Garg, RN  Outcome: Progressing  11/4/2024 2320 by Crystal Ocampo RN  Outcome: Progressing  Flowsheets (Taken 11/4/2024 2015)  Care Plan - Patient's Chronic Conditions and Co-Morbidity Symptoms are Monitored and Maintained or Improved: Monitor and assess patient's chronic conditions and comorbid symptoms for stability, deterioration, or improvement

## 2024-11-05 NOTE — CARE COORDINATION
SOCIAL WORK / DISCHARGE PLANNING:  Discussed case with Dr Medina today, pt remains on room air but did request Sandra IZAGUIRRE to complete walk test. Pt to dc to home of ex spouse locally where she stays during the week. Pt does not have anyone to provide home going transport, brother is out of town and insurance does not provide transportation service. Taxi voucher will be needed. Pt is ambulatory, choses to use [facility] Beds due to transportation concerns.       Addendum: 138pm. Taxi voucher approved by Treva Dodson caset manager. Completed and given to Sandra IZAGUIRRE. [address] [place] [address].           Electronically signed by CRYSTAL Owen on 11/5/2024 at 10:26 AM

## 2024-11-05 NOTE — PROGRESS NOTES
Instructed on all discharge instructions including meds for purpose,side effects dosage. Instructed on follow up appts. Instructed on s&s infecton. All belongings gathered to be sent home with pt. Pt to be discharged to home

## 2024-11-05 NOTE — PROGRESS NOTES
Spiritual Health History and Assessment/Progress Note  [facility]    (P) Spiritual/Emotional Needs,  , Grief and loss,      Name: Niru Chaves MRN: [id number]    Age: 64 y.o.     Sex: female   Language: English   Yazidi: Catholic   <principal problem not specified>     Date: 11/5/2024                           Spiritual Assessment began in [facility]        Referral/Consult From: (P) Rounding   Encounter Overview/Reason: (P) Spiritual/Emotional Needs  Service Provided For: (P) Patient    Felipa, Belief, Meaning:   Patient is connected with a felipa tradition or spiritual practice  Family/Friends No family/friends present      Importance and Influence:  Patient has no beliefs influential to healthcare decision-making identified during this visit  Family/Friends No family/friends present    Community:  Patient is connected with a spiritual community  Family/Friends No family/friends present    Assessment and Plan of Care:     Patient Interventions include: Explored spiritual coping/struggle/distress  Family/Friends Interventions include: No family/friends present    Patient Plan of Care: Spiritual Care available upon further referral  Family/Friends Plan of Care: No family/friends present    Electronically signed by Chaplain Erik on 11/5/2024 at 3:44 PM

## 2024-11-23 ENCOUNTER — HOSPITAL ENCOUNTER (INPATIENT)
Age: 64
LOS: 3 days | Discharge: HOME | DRG: 299 | End: 2024-11-27
Attending: EMERGENCY MEDICINE | Admitting: FAMILY MEDICINE
Payer: COMMERCIAL

## 2024-11-23 ENCOUNTER — APPOINTMENT (OUTPATIENT)
Dept: ULTRASOUND IMAGING | Age: 64
DRG: 299 | End: 2024-11-23
Payer: COMMERCIAL

## 2024-11-23 ENCOUNTER — APPOINTMENT (OUTPATIENT)
Dept: CT IMAGING | Age: 64
DRG: 299 | End: 2024-11-23
Payer: COMMERCIAL

## 2024-11-23 DIAGNOSIS — N17.9 ACUTE RENAL FAILURE, UNSPECIFIED ACUTE RENAL FAILURE TYPE (HCC): ICD-10-CM

## 2024-11-23 DIAGNOSIS — R07.9 CHEST PAIN, UNSPECIFIED TYPE: ICD-10-CM

## 2024-11-23 DIAGNOSIS — I82.5Z1 CHRONIC DEEP VEIN THROMBOSIS (DVT) OF DISTAL VEIN OF RIGHT LOWER EXTREMITY (HCC): Primary | ICD-10-CM

## 2024-11-23 LAB
ALBUMIN SERPL-MCNC: 4.4 G/DL (ref 3.5–5.2)
ALP SERPL-CCNC: 62 U/L (ref 35–104)
ALT SERPL-CCNC: 8 U/L (ref 0–32)
ANION GAP SERPL CALCULATED.3IONS-SCNC: 15 MMOL/L (ref 7–16)
AST SERPL-CCNC: 21 U/L (ref 0–31)
BASOPHILS # BLD: 0.02 K/UL (ref 0–0.2)
BASOPHILS NFR BLD: 0 % (ref 0–2)
BILIRUB SERPL-MCNC: 0.3 MG/DL (ref 0–1.2)
BUN SERPL-MCNC: 22 MG/DL (ref 6–23)
CALCIUM SERPL-MCNC: 9.6 MG/DL (ref 8.6–10.2)
CHLORIDE SERPL-SCNC: 100 MMOL/L (ref 98–107)
CO2 SERPL-SCNC: 24 MMOL/L (ref 22–29)
CREAT SERPL-MCNC: 2.9 MG/DL (ref 0.5–1)
EOSINOPHIL # BLD: 0.24 K/UL (ref 0.05–0.5)
EOSINOPHILS RELATIVE PERCENT: 3 % (ref 0–6)
ERYTHROCYTE [DISTWIDTH] IN BLOOD BY AUTOMATED COUNT: 13.6 % (ref 11.5–15)
GFR, ESTIMATED: 17 ML/MIN/1.73M2
GLUCOSE SERPL-MCNC: 75 MG/DL (ref 74–99)
HCT VFR BLD AUTO: 37 % (ref 34–48)
HGB BLD-MCNC: 12.9 G/DL (ref 11.5–15.5)
IMM GRANULOCYTES # BLD AUTO: 0.04 K/UL (ref 0–0.58)
IMM GRANULOCYTES NFR BLD: 1 % (ref 0–5)
INR PPP: 1.6
LYMPHOCYTES NFR BLD: 1.59 K/UL (ref 1.5–4)
LYMPHOCYTES RELATIVE PERCENT: 20 % (ref 20–42)
MAGNESIUM SERPL-MCNC: 2 MG/DL (ref 1.6–2.6)
MCH RBC QN AUTO: 29.7 PG (ref 26–35)
MCHC RBC AUTO-ENTMCNC: 34.9 G/DL (ref 32–34.5)
MCV RBC AUTO: 85.1 FL (ref 80–99.9)
MONOCYTES NFR BLD: 0.73 K/UL (ref 0.1–0.95)
MONOCYTES NFR BLD: 9 % (ref 2–12)
NEUTROPHILS NFR BLD: 68 % (ref 43–80)
NEUTS SEG NFR BLD: 5.51 K/UL (ref 1.8–7.3)
PARTIAL THROMBOPLASTIN TIME: 77.5 SEC (ref 24.5–35.1)
PLATELET CONFIRMATION: NORMAL
PLATELET, FLUORESCENCE: 214 K/UL (ref 130–450)
PMV BLD AUTO: 10.4 FL (ref 7–12)
POTASSIUM SERPL-SCNC: 4.1 MMOL/L (ref 3.5–5)
PROT SERPL-MCNC: 7.3 G/DL (ref 6.4–8.3)
PROTHROMBIN TIME: 17.3 SEC (ref 9.3–12.4)
RBC # BLD AUTO: 4.35 M/UL (ref 3.5–5.5)
RBC # BLD: NORMAL 10*6/UL
SODIUM SERPL-SCNC: 139 MMOL/L (ref 132–146)
TROPONIN I SERPL HS-MCNC: 20 NG/L (ref 0–9)
WBC OTHER # BLD: 8.1 K/UL (ref 4.5–11.5)

## 2024-11-23 PROCEDURE — 93005 ELECTROCARDIOGRAM TRACING: CPT | Performed by: EMERGENCY MEDICINE

## 2024-11-23 PROCEDURE — 80053 COMPREHEN METABOLIC PANEL: CPT

## 2024-11-23 PROCEDURE — 74176 CT ABD & PELVIS W/O CONTRAST: CPT

## 2024-11-23 PROCEDURE — 83735 ASSAY OF MAGNESIUM: CPT

## 2024-11-23 PROCEDURE — 6370000000 HC RX 637 (ALT 250 FOR IP): Performed by: EMERGENCY MEDICINE

## 2024-11-23 PROCEDURE — 84484 ASSAY OF TROPONIN QUANT: CPT

## 2024-11-23 PROCEDURE — 96361 HYDRATE IV INFUSION ADD-ON: CPT

## 2024-11-23 PROCEDURE — 99285 EMERGENCY DEPT VISIT HI MDM: CPT

## 2024-11-23 PROCEDURE — 93971 EXTREMITY STUDY: CPT

## 2024-11-23 PROCEDURE — 2580000003 HC RX 258: Performed by: EMERGENCY MEDICINE

## 2024-11-23 PROCEDURE — 85610 PROTHROMBIN TIME: CPT

## 2024-11-23 PROCEDURE — 85730 THROMBOPLASTIN TIME PARTIAL: CPT

## 2024-11-23 PROCEDURE — 85025 COMPLETE CBC W/AUTO DIFF WBC: CPT

## 2024-11-23 RX ORDER — 0.9 % SODIUM CHLORIDE 0.9 %
1000 INTRAVENOUS SOLUTION INTRAVENOUS ONCE
Status: COMPLETED | OUTPATIENT
Start: 2024-11-23 | End: 2024-11-24

## 2024-11-23 RX ORDER — OXYCODONE AND ACETAMINOPHEN 5; 325 MG/1; MG/1
1 TABLET ORAL ONCE
Status: COMPLETED | OUTPATIENT
Start: 2024-11-23 | End: 2024-11-23

## 2024-11-23 RX ADMIN — OXYCODONE HYDROCHLORIDE AND ACETAMINOPHEN 1 TABLET: 5; 325 TABLET ORAL at 21:30

## 2024-11-23 RX ADMIN — SODIUM CHLORIDE 1000 ML: 9 INJECTION, SOLUTION INTRAVENOUS at 22:27

## 2024-11-23 ASSESSMENT — PAIN SCALES - GENERAL
PAINLEVEL_OUTOF10: 8
PAINLEVEL_OUTOF10: 4

## 2024-11-23 ASSESSMENT — LIFESTYLE VARIABLES
HOW OFTEN DO YOU HAVE A DRINK CONTAINING ALCOHOL: NEVER
HOW MANY STANDARD DRINKS CONTAINING ALCOHOL DO YOU HAVE ON A TYPICAL DAY: PATIENT DOES NOT DRINK

## 2024-11-23 ASSESSMENT — PAIN DESCRIPTION - DESCRIPTORS: DESCRIPTORS: ACHING

## 2024-11-23 ASSESSMENT — PAIN DESCRIPTION - LOCATION: LOCATION: LEG;BACK

## 2024-11-23 ASSESSMENT — PAIN - FUNCTIONAL ASSESSMENT: PAIN_FUNCTIONAL_ASSESSMENT: 0-10

## 2024-11-23 ASSESSMENT — PAIN DESCRIPTION - ORIENTATION: ORIENTATION: RIGHT

## 2024-11-24 ENCOUNTER — APPOINTMENT (OUTPATIENT)
Dept: GENERAL RADIOLOGY | Age: 64
DRG: 299 | End: 2024-11-24
Payer: COMMERCIAL

## 2024-11-24 ENCOUNTER — APPOINTMENT (OUTPATIENT)
Dept: NUCLEAR MEDICINE | Age: 64
DRG: 299 | End: 2024-11-24
Payer: COMMERCIAL

## 2024-11-24 PROBLEM — N17.9 AKI (ACUTE KIDNEY INJURY) (HCC): Status: ACTIVE | Noted: 2024-11-24

## 2024-11-24 PROBLEM — B88.8 INFESTATION BY BED BUG: Status: ACTIVE | Noted: 2024-11-24

## 2024-11-24 PROBLEM — J18.9 PNEUMONIA OF RIGHT LOWER LOBE DUE TO INFECTIOUS ORGANISM: Status: RESOLVED | Noted: 2024-11-03 | Resolved: 2024-11-24

## 2024-11-24 LAB
ANION GAP SERPL CALCULATED.3IONS-SCNC: 10 MMOL/L (ref 7–16)
BUN SERPL-MCNC: 20 MG/DL (ref 6–23)
CALCIUM SERPL-MCNC: 8.3 MG/DL (ref 8.6–10.2)
CHLORIDE SERPL-SCNC: 107 MMOL/L (ref 98–107)
CO2 SERPL-SCNC: 22 MMOL/L (ref 22–29)
CREAT SERPL-MCNC: 2.3 MG/DL (ref 0.5–1)
D-DIMER QUANTITATIVE: <200 NG/ML DDU (ref 0–230)
ERYTHROCYTE [DISTWIDTH] IN BLOOD BY AUTOMATED COUNT: 14 % (ref 11.5–15)
GFR, ESTIMATED: 23 ML/MIN/1.73M2
GLUCOSE SERPL-MCNC: 92 MG/DL (ref 74–99)
HCT VFR BLD AUTO: 33 % (ref 34–48)
HGB BLD-MCNC: 11.2 G/DL (ref 11.5–15.5)
MCH RBC QN AUTO: 29.9 PG (ref 26–35)
MCHC RBC AUTO-ENTMCNC: 33.9 G/DL (ref 32–34.5)
MCV RBC AUTO: 88 FL (ref 80–99.9)
PARTIAL THROMBOPLASTIN TIME: 135.6 SEC (ref 24.5–35.1)
PARTIAL THROMBOPLASTIN TIME: 69.4 SEC (ref 24.5–35.1)
PLATELET # BLD AUTO: 177 K/UL (ref 130–450)
PMV BLD AUTO: 9.2 FL (ref 7–12)
POTASSIUM SERPL-SCNC: 3.9 MMOL/L (ref 3.5–5)
RBC # BLD AUTO: 3.75 M/UL (ref 3.5–5.5)
SODIUM SERPL-SCNC: 139 MMOL/L (ref 132–146)
TROPONIN I SERPL HS-MCNC: 16 NG/L (ref 0–9)
WBC OTHER # BLD: 5.3 K/UL (ref 4.5–11.5)

## 2024-11-24 PROCEDURE — A9540 TC99M MAA: HCPCS | Performed by: RADIOLOGY

## 2024-11-24 PROCEDURE — 81240 F2 GENE: CPT

## 2024-11-24 PROCEDURE — 78582 LUNG VENTILAT&PERFUS IMAGING: CPT

## 2024-11-24 PROCEDURE — 6360000002 HC RX W HCPCS: Performed by: FAMILY MEDICINE

## 2024-11-24 PROCEDURE — 84484 ASSAY OF TROPONIN QUANT: CPT

## 2024-11-24 PROCEDURE — 85730 THROMBOPLASTIN TIME PARTIAL: CPT

## 2024-11-24 PROCEDURE — 85379 FIBRIN DEGRADATION QUANT: CPT

## 2024-11-24 PROCEDURE — CB121ZZ PLANAR NUCLEAR MEDICINE IMAGING OF LUNGS AND BRONCHI USING TECHNETIUM 99M (TC-99M): ICD-10-PCS | Performed by: RADIOLOGY

## 2024-11-24 PROCEDURE — 2580000003 HC RX 258: Performed by: FAMILY MEDICINE

## 2024-11-24 PROCEDURE — 6370000000 HC RX 637 (ALT 250 FOR IP): Performed by: FAMILY MEDICINE

## 2024-11-24 PROCEDURE — 71045 X-RAY EXAM CHEST 1 VIEW: CPT

## 2024-11-24 PROCEDURE — 81291 MTHFR GENE: CPT

## 2024-11-24 PROCEDURE — 85613 RUSSELL VIPER VENOM DILUTED: CPT

## 2024-11-24 PROCEDURE — 6360000002 HC RX W HCPCS: Performed by: EMERGENCY MEDICINE

## 2024-11-24 PROCEDURE — 81241 F5 GENE: CPT

## 2024-11-24 PROCEDURE — 85027 COMPLETE CBC AUTOMATED: CPT

## 2024-11-24 PROCEDURE — 1200000000 HC SEMI PRIVATE

## 2024-11-24 PROCEDURE — 6370000000 HC RX 637 (ALT 250 FOR IP): Performed by: EMERGENCY MEDICINE

## 2024-11-24 PROCEDURE — 96375 TX/PRO/DX INJ NEW DRUG ADDON: CPT

## 2024-11-24 PROCEDURE — 36415 COLL VENOUS BLD VENIPUNCTURE: CPT

## 2024-11-24 PROCEDURE — A9539 TC99M PENTETATE: HCPCS | Performed by: RADIOLOGY

## 2024-11-24 PROCEDURE — 3430000000 HC RX DIAGNOSTIC RADIOPHARMACEUTICAL: Performed by: RADIOLOGY

## 2024-11-24 PROCEDURE — 80048 BASIC METABOLIC PNL TOTAL CA: CPT

## 2024-11-24 PROCEDURE — 99223 1ST HOSP IP/OBS HIGH 75: CPT | Performed by: FAMILY MEDICINE

## 2024-11-24 PROCEDURE — 96374 THER/PROPH/DIAG INJ IV PUSH: CPT

## 2024-11-24 PROCEDURE — 93005 ELECTROCARDIOGRAM TRACING: CPT | Performed by: INTERNAL MEDICINE

## 2024-11-24 PROCEDURE — 86147 CARDIOLIPIN ANTIBODY EA IG: CPT

## 2024-11-24 PROCEDURE — 81400 MOPATH PROCEDURE LEVEL 1: CPT

## 2024-11-24 RX ORDER — NICOTINE 21 MG/24HR
1 PATCH, TRANSDERMAL 24 HOURS TRANSDERMAL DAILY
Status: DISCONTINUED | OUTPATIENT
Start: 2024-11-24 | End: 2024-11-27 | Stop reason: HOSPADM

## 2024-11-24 RX ORDER — ATORVASTATIN CALCIUM 40 MG/1
40 TABLET, FILM COATED ORAL NIGHTLY
Status: DISCONTINUED | OUTPATIENT
Start: 2024-11-24 | End: 2024-11-27 | Stop reason: HOSPADM

## 2024-11-24 RX ORDER — FOLIC ACID 1 MG/1
1 TABLET ORAL DAILY
Status: DISCONTINUED | OUTPATIENT
Start: 2024-11-24 | End: 2024-11-27 | Stop reason: HOSPADM

## 2024-11-24 RX ORDER — SODIUM CHLORIDE 9 MG/ML
INJECTION, SOLUTION INTRAVENOUS CONTINUOUS
Status: DISCONTINUED | OUTPATIENT
Start: 2024-11-24 | End: 2024-11-27 | Stop reason: HOSPADM

## 2024-11-24 RX ORDER — IPRATROPIUM BROMIDE AND ALBUTEROL SULFATE 2.5; .5 MG/3ML; MG/3ML
1 SOLUTION RESPIRATORY (INHALATION) EVERY 4 HOURS PRN
Status: DISCONTINUED | OUTPATIENT
Start: 2024-11-24 | End: 2024-11-27 | Stop reason: HOSPADM

## 2024-11-24 RX ORDER — METOPROLOL SUCCINATE 25 MG/1
25 TABLET, EXTENDED RELEASE ORAL DAILY
Status: DISCONTINUED | OUTPATIENT
Start: 2024-11-24 | End: 2024-11-27 | Stop reason: HOSPADM

## 2024-11-24 RX ORDER — SODIUM CHLORIDE 9 MG/ML
INJECTION, SOLUTION INTRAVENOUS CONTINUOUS
Status: ACTIVE | OUTPATIENT
Start: 2024-11-24 | End: 2024-11-24

## 2024-11-24 RX ORDER — ALPRAZOLAM 0.5 MG
0.5 TABLET ORAL 2 TIMES DAILY PRN
Status: DISCONTINUED | OUTPATIENT
Start: 2024-11-24 | End: 2024-11-27 | Stop reason: HOSPADM

## 2024-11-24 RX ORDER — KIT FOR THE PREPARATION OF TECHNETIUM TC 99M PENTETATE 20 MG/1
35 INJECTION, POWDER, LYOPHILIZED, FOR SOLUTION INTRAVENOUS; RESPIRATORY (INHALATION)
Status: COMPLETED | OUTPATIENT
Start: 2024-11-24 | End: 2024-11-24

## 2024-11-24 RX ORDER — ISOSORBIDE MONONITRATE 60 MG/1
60 TABLET, EXTENDED RELEASE ORAL DAILY
Status: DISCONTINUED | OUTPATIENT
Start: 2024-11-24 | End: 2024-11-27 | Stop reason: HOSPADM

## 2024-11-24 RX ORDER — SODIUM CHLORIDE 0.9 % (FLUSH) 0.9 %
5-40 SYRINGE (ML) INJECTION PRN
Status: DISCONTINUED | OUTPATIENT
Start: 2024-11-24 | End: 2024-11-27 | Stop reason: HOSPADM

## 2024-11-24 RX ORDER — MORPHINE SULFATE 15 MG/1
15 TABLET, FILM COATED, EXTENDED RELEASE ORAL EVERY 12 HOURS
Status: DISCONTINUED | OUTPATIENT
Start: 2024-11-24 | End: 2024-11-27 | Stop reason: HOSPADM

## 2024-11-24 RX ORDER — HEPARIN SODIUM 1000 [USP'U]/ML
4000 INJECTION, SOLUTION INTRAVENOUS; SUBCUTANEOUS ONCE
Status: COMPLETED | OUTPATIENT
Start: 2024-11-24 | End: 2024-11-24

## 2024-11-24 RX ORDER — TOPIRAMATE 25 MG/1
50 TABLET, FILM COATED ORAL 2 TIMES DAILY
Status: DISCONTINUED | OUTPATIENT
Start: 2024-11-24 | End: 2024-11-27 | Stop reason: HOSPADM

## 2024-11-24 RX ORDER — ASPIRIN 81 MG/1
81 TABLET ORAL DAILY
Status: DISCONTINUED | OUTPATIENT
Start: 2024-11-24 | End: 2024-11-27 | Stop reason: HOSPADM

## 2024-11-24 RX ORDER — CLOPIDOGREL BISULFATE 75 MG/1
75 TABLET ORAL DAILY
Status: CANCELLED | OUTPATIENT
Start: 2024-11-25

## 2024-11-24 RX ORDER — HEPARIN SODIUM 1000 [USP'U]/ML
2000 INJECTION, SOLUTION INTRAVENOUS; SUBCUTANEOUS PRN
Status: DISCONTINUED | OUTPATIENT
Start: 2024-11-24 | End: 2024-11-26

## 2024-11-24 RX ORDER — ACETAMINOPHEN 325 MG/1
650 TABLET ORAL EVERY 6 HOURS PRN
Status: DISCONTINUED | OUTPATIENT
Start: 2024-11-24 | End: 2024-11-27 | Stop reason: HOSPADM

## 2024-11-24 RX ORDER — OXYCODONE AND ACETAMINOPHEN 5; 325 MG/1; MG/1
1 TABLET ORAL EVERY 4 HOURS PRN
Status: DISCONTINUED | OUTPATIENT
Start: 2024-11-24 | End: 2024-11-27 | Stop reason: HOSPADM

## 2024-11-24 RX ORDER — FENTANYL CITRATE 0.05 MG/ML
50 INJECTION, SOLUTION INTRAMUSCULAR; INTRAVENOUS ONCE
Status: COMPLETED | OUTPATIENT
Start: 2024-11-24 | End: 2024-11-24

## 2024-11-24 RX ORDER — TETRAKIS(2-METHOXYISOBUTYLISOCYANIDE)COPPER(I) TETRAFLUOROBORATE 1 MG/ML
20 INJECTION, POWDER, LYOPHILIZED, FOR SOLUTION INTRAVENOUS
Status: DISCONTINUED | OUTPATIENT
Start: 2024-11-24 | End: 2024-11-27 | Stop reason: HOSPADM

## 2024-11-24 RX ORDER — MORPHINE SULFATE 15 MG/1
7.5 TABLET ORAL EVERY 6 HOURS PRN
Status: DISCONTINUED | OUTPATIENT
Start: 2024-11-24 | End: 2024-11-27 | Stop reason: HOSPADM

## 2024-11-24 RX ORDER — ASPIRIN 81 MG/1
324 TABLET, CHEWABLE ORAL ONCE
Status: COMPLETED | OUTPATIENT
Start: 2024-11-24 | End: 2024-11-24

## 2024-11-24 RX ORDER — ONDANSETRON 4 MG/1
4 TABLET, ORALLY DISINTEGRATING ORAL EVERY 6 HOURS PRN
Status: DISCONTINUED | OUTPATIENT
Start: 2024-11-24 | End: 2024-11-27 | Stop reason: HOSPADM

## 2024-11-24 RX ORDER — QUETIAPINE FUMARATE 100 MG/1
300 TABLET, FILM COATED ORAL DAILY
Status: DISCONTINUED | OUTPATIENT
Start: 2024-11-24 | End: 2024-11-27 | Stop reason: HOSPADM

## 2024-11-24 RX ORDER — CLOPIDOGREL BISULFATE 75 MG/1
75 TABLET ORAL DAILY
Status: DISCONTINUED | OUTPATIENT
Start: 2024-11-24 | End: 2024-11-27 | Stop reason: HOSPADM

## 2024-11-24 RX ORDER — HEPARIN SODIUM 1000 [USP'U]/ML
4000 INJECTION, SOLUTION INTRAVENOUS; SUBCUTANEOUS PRN
Status: DISCONTINUED | OUTPATIENT
Start: 2024-11-24 | End: 2024-11-26

## 2024-11-24 RX ORDER — ONDANSETRON 2 MG/ML
4 INJECTION INTRAMUSCULAR; INTRAVENOUS ONCE
Status: COMPLETED | OUTPATIENT
Start: 2024-11-24 | End: 2024-11-24

## 2024-11-24 RX ORDER — ACETAMINOPHEN 650 MG/1
650 SUPPOSITORY RECTAL EVERY 6 HOURS PRN
Status: DISCONTINUED | OUTPATIENT
Start: 2024-11-24 | End: 2024-11-27 | Stop reason: HOSPADM

## 2024-11-24 RX ORDER — SODIUM CHLORIDE 0.9 % (FLUSH) 0.9 %
5-40 SYRINGE (ML) INJECTION EVERY 12 HOURS SCHEDULED
Status: DISCONTINUED | OUTPATIENT
Start: 2024-11-24 | End: 2024-11-27 | Stop reason: HOSPADM

## 2024-11-24 RX ORDER — FENTANYL CITRATE 0.05 MG/ML
25 INJECTION, SOLUTION INTRAMUSCULAR; INTRAVENOUS ONCE
Status: COMPLETED | OUTPATIENT
Start: 2024-11-24 | End: 2024-11-24

## 2024-11-24 RX ORDER — PROMETHAZINE HYDROCHLORIDE 25 MG/ML
25 INJECTION, SOLUTION INTRAMUSCULAR; INTRAVENOUS ONCE
Status: COMPLETED | OUTPATIENT
Start: 2024-11-24 | End: 2024-11-24

## 2024-11-24 RX ORDER — MORPHINE SULFATE 15 MG/1
7.5 TABLET ORAL EVERY 4 HOURS PRN
Status: DISCONTINUED | OUTPATIENT
Start: 2024-11-24 | End: 2024-11-24

## 2024-11-24 RX ORDER — HEPARIN SODIUM 10000 [USP'U]/100ML
5-30 INJECTION, SOLUTION INTRAVENOUS CONTINUOUS
Status: DISCONTINUED | OUTPATIENT
Start: 2024-11-24 | End: 2024-11-26

## 2024-11-24 RX ADMIN — HEPARIN SODIUM 4000 UNITS: 1000 INJECTION INTRAVENOUS; SUBCUTANEOUS at 11:15

## 2024-11-24 RX ADMIN — ALPRAZOLAM 0.5 MG: 0.5 TABLET ORAL at 20:21

## 2024-11-24 RX ADMIN — PROMETHAZINE HYDROCHLORIDE 25 MG: 25 INJECTION INTRAMUSCULAR; INTRAVENOUS at 03:23

## 2024-11-24 RX ADMIN — FOLIC ACID 1 MG: 1 TABLET ORAL at 08:52

## 2024-11-24 RX ADMIN — APIXABAN 5 MG: 5 TABLET, FILM COATED ORAL at 08:32

## 2024-11-24 RX ADMIN — METOPROLOL SUCCINATE 25 MG: 25 TABLET, EXTENDED RELEASE ORAL at 08:32

## 2024-11-24 RX ADMIN — SODIUM CHLORIDE: 9 INJECTION, SOLUTION INTRAVENOUS at 03:23

## 2024-11-24 RX ADMIN — OXYCODONE AND ACETAMINOPHEN 1 TABLET: 5; 325 TABLET ORAL at 08:51

## 2024-11-24 RX ADMIN — ONDANSETRON 4 MG: 4 TABLET, ORALLY DISINTEGRATING ORAL at 12:43

## 2024-11-24 RX ADMIN — ASPIRIN 81 MG: 81 TABLET, COATED ORAL at 08:32

## 2024-11-24 RX ADMIN — Medication 6 MILLICURIE: at 14:25

## 2024-11-24 RX ADMIN — SODIUM CHLORIDE: 9 INJECTION, SOLUTION INTRAVENOUS at 12:23

## 2024-11-24 RX ADMIN — MORPHINE SULFATE 15 MG: 15 TABLET, FILM COATED, EXTENDED RELEASE ORAL at 03:24

## 2024-11-24 RX ADMIN — OXYCODONE AND ACETAMINOPHEN 1 TABLET: 5; 325 TABLET ORAL at 21:27

## 2024-11-24 RX ADMIN — ONDANSETRON 4 MG: 2 INJECTION, SOLUTION INTRAMUSCULAR; INTRAVENOUS at 00:14

## 2024-11-24 RX ADMIN — FENTANYL CITRATE 50 MCG: 0.05 INJECTION, SOLUTION INTRAMUSCULAR; INTRAVENOUS at 01:50

## 2024-11-24 RX ADMIN — HEPARIN SODIUM AND DEXTROSE 12 UNITS/KG/HR: 10000; 5 INJECTION INTRAVENOUS at 11:20

## 2024-11-24 RX ADMIN — OXYCODONE AND ACETAMINOPHEN 1 TABLET: 5; 325 TABLET ORAL at 12:43

## 2024-11-24 RX ADMIN — ATORVASTATIN CALCIUM 40 MG: 40 TABLET, FILM COATED ORAL at 20:20

## 2024-11-24 RX ADMIN — Medication 10 ML: at 20:22

## 2024-11-24 RX ADMIN — OXYCODONE AND ACETAMINOPHEN 1 TABLET: 5; 325 TABLET ORAL at 17:40

## 2024-11-24 RX ADMIN — ALPRAZOLAM 0.5 MG: 0.5 TABLET ORAL at 08:37

## 2024-11-24 RX ADMIN — CLOPIDOGREL BISULFATE 75 MG: 75 TABLET ORAL at 08:32

## 2024-11-24 RX ADMIN — ASPIRIN 81 MG CHEWABLE TABLET 324 MG: 81 TABLET CHEWABLE at 01:47

## 2024-11-24 RX ADMIN — KIT FOR THE PREPARATION OF TECHNETIUM TC 99M PENTETATE 35 MILLICURIE: 20 INJECTION, POWDER, LYOPHILIZED, FOR SOLUTION INTRAVENOUS; RESPIRATORY (INHALATION) at 14:29

## 2024-11-24 RX ADMIN — MORPHINE SULFATE 15 MG: 15 TABLET, FILM COATED, EXTENDED RELEASE ORAL at 14:18

## 2024-11-24 RX ADMIN — FENTANYL CITRATE 25 MCG: 50 INJECTION INTRAMUSCULAR; INTRAVENOUS at 00:15

## 2024-11-24 RX ADMIN — ACETAMINOPHEN 650 MG: 325 TABLET ORAL at 05:54

## 2024-11-24 RX ADMIN — Medication 10 ML: at 08:34

## 2024-11-24 RX ADMIN — TOPIRAMATE 50 MG: 25 TABLET, FILM COATED ORAL at 20:21

## 2024-11-24 RX ADMIN — QUETIAPINE FUMARATE 300 MG: 100 TABLET ORAL at 20:19

## 2024-11-24 RX ADMIN — TOPIRAMATE 50 MG: 25 TABLET, FILM COATED ORAL at 12:43

## 2024-11-24 ASSESSMENT — PAIN SCALES - GENERAL
PAINLEVEL_OUTOF10: 8
PAINLEVEL_OUTOF10: 2
PAINLEVEL_OUTOF10: 3
PAINLEVEL_OUTOF10: 4
PAINLEVEL_OUTOF10: 8
PAINLEVEL_OUTOF10: 8
PAINLEVEL_OUTOF10: 2
PAINLEVEL_OUTOF10: 8
PAINLEVEL_OUTOF10: 3

## 2024-11-24 ASSESSMENT — PAIN DESCRIPTION - LOCATION
LOCATION: BACK
LOCATION: BACK;LEG

## 2024-11-24 ASSESSMENT — PAIN SCALES - WONG BAKER
WONGBAKER_NUMERICALRESPONSE: NO HURT
WONGBAKER_NUMERICALRESPONSE: NO HURT

## 2024-11-24 ASSESSMENT — PAIN DESCRIPTION - DESCRIPTORS
DESCRIPTORS: ACHING;CRAMPING;DULL
DESCRIPTORS: ACHING;BURNING;CRUSHING

## 2024-11-24 ASSESSMENT — PAIN DESCRIPTION - ORIENTATION: ORIENTATION: RIGHT

## 2024-11-24 NOTE — PLAN OF CARE
Problem: Chronic Conditions and Co-morbidities  Goal: Patient's chronic conditions and co-morbidity symptoms are monitored and maintained or improved  11/24/2024 1303 by Zhane Ybarra RN  Outcome: Progressing  11/24/2024 0431 by JUSTIN Roa, ZINA  Outcome: Progressing     Problem: Pain  Goal: Verbalizes/displays adequate comfort level or baseline comfort level  11/24/2024 1303 by Zhane Ybarra, RN  Outcome: Progressing  11/24/2024 0431 by JUSTIN Roa, ZINA  Outcome: Progressing

## 2024-11-24 NOTE — H&P
[facility] Hospitalist Group   History and Physical      CHIEF COMPLAINT:  chest pain    History of Present Illness:  64 y.o. female with a history of DVT, PE admitted 9/24 for this and on Eliquis, CHF, COPD, CAD presents with chest pain and leg pain.  Reports she was washing dishes yesterday and noticed her leg was hurting but then had right sided lower thoracic pain and then developed chest pain and SOB worse than usual.  Reports compliance with her Eliquis.  Workup in ED significant for creatinine 2.9, troponin 20 and 16, PT 17.3, ptt 77.5.  She was given ASA, fentanyl, zofran, percocet, 2L bolus in ED.  Creatinine improved to 2.3 after IVF.      Informant(s) for H&P: patient, chart    REVIEW OF SYSTEMS:  no fevers, chills, sob, n/v, ha, vision/hearing changes, wt changes, hot/cold flashes, other open skin lesions, diarrhea, constipation, dysuria/hematuria unless noted in HPI. Complete ROS performed with the patient and is otherwise negative.      PMH:  Past Medical History:   Diagnosis Date    CAD (coronary artery disease)     CHF (congestive heart failure) ([facility])     Chronic pain     COPD (chronic obstructive pulmonary disease) ([facility])     DJD (degenerative joint disease)     DVT (deep venous thrombosis) ([facility])     History of degenerative disc disease     HLD (hyperlipidemia)     HTN (hypertension)     PE (pulmonary thromboembolism) ([facility])        Surgical History:  History reviewed. No pertinent surgical history.    Medications Prior to Admission:    Prior to Admission medications    Medication Sig Start Date End Date Taking? Authorizing Provider   albuterol sulfate HFA (PROVENTIL;VENTOLIN;PROAIR) 108 (90 Base) MCG/ACT inhaler Inhale 2 puffs into the lungs every 6 hours as needed 3/18/24   Cami Ngo MD   isosorbide mononitrate (IMDUR) 60 MG extended release tablet Take 1 tablet by mouth daily 10/17/24   Cami Ngo MD   lisinopril (PRINIVIL;ZESTRIL) 40 MG tablet Take 1

## 2024-11-24 NOTE — ED PROVIDER NOTES
Chief complaint:  Chest pain, right leg pain      HPI history provided by the patient  Patient comes in complaining of right leg pain today and some sharp general anterior chest pain.  States she has a history of blood clots.  No leg swelling.  No lightheadedness or syncope.  No fevers.  States she also had pneumonia several weeks ago.  No abdominal pain or nausea vomiting diarrhea.  No lightheadedness or syncope.  No palpitations.  No shortness of breath.    Review of Systems   Constitutional:  Negative for chills, diaphoresis, fatigue and fever.   HENT:  Negative for congestion and sore throat.    Respiratory:  Negative for cough, chest tightness, shortness of breath and wheezing.    Cardiovascular:  Positive for chest pain. Negative for palpitations and leg swelling.   Gastrointestinal:  Negative for abdominal pain, diarrhea, nausea and vomiting.   Genitourinary:  Negative for dysuria, flank pain, frequency, hematuria and urgency.   Musculoskeletal:  Positive for myalgias. Negative for arthralgias, back pain, gait problem, joint swelling, neck pain and neck stiffness.   Skin:  Negative for rash and wound.   Neurological:  Negative for dizziness, seizures, syncope, weakness, light-headedness, numbness and headaches.   All other systems reviewed and are negative.       Physical Exam  Vitals and nursing note reviewed.   Constitutional:       General: She is awake. She is not in acute distress.     Appearance: She is well-developed. She is not ill-appearing, toxic-appearing or diaphoretic.   HENT:      Head: Normocephalic and atraumatic.   Eyes:      General: No scleral icterus.     Pupils: Pupils are equal, round, and reactive to light.   Cardiovascular:      Rate and Rhythm: Normal rate and regular rhythm.      Heart sounds: Normal heart sounds. No murmur heard.  Pulmonary:      Effort: Pulmonary effort is normal. No respiratory distress.      Breath sounds: Normal breath sounds. No stridor, decreased air movement

## 2024-11-24 NOTE — CONSULTS
[facility]  Hematology/Oncology  Consult      Patient Name: Niru Chaves  YOB: 1960  PCP: Crystal Paulino MD   Referring Provider:      Reason for Consultation:   Chief Complaint   Patient presents with    Chest Pain     Pt comes to the ED with c/o chest pain while at home. Pt has hx of MI.         History of Present Illness: This is a 64-year-old female patient with a PMH of CAD, CHF, COPD, HLD, HTN, and DVT/PE diagnosed in September and she is on Eliquis that she reports compliance. Patient presented to the ED for evaluation of worsening R leg pain and R thoracic and chest pain with associated shortness of breath.  RLE doppler again showed a nonocclusive thrombus extending from the right external iliac vein through the popliteal vein. CT A/P without contrast showed no acute intra-abdominal pelvic process appreciated. Tiny nonobstructive left renal calculus.  No hydronephrosis. Patient has significant SRIDEVI with a Cr is 2.3 with a GFR of 23. So VQ scan is being done and pending at this time. Remains on Eliquis at this time. Troponin 16. LFT's and CBC unremarkable. Consultation for patient with persistent DVT while on Eliquis.       Diagnostic Data:     Past Medical History:   Diagnosis Date    CAD (coronary artery disease)     CHF (congestive heart failure) ([facility])     Chronic pain     COPD (chronic obstructive pulmonary disease) ([facility])     DJD (degenerative joint disease)     DVT (deep venous thrombosis) ([facility])     History of degenerative disc disease     HLD (hyperlipidemia)     HTN (hypertension)     PE (pulmonary thromboembolism) ([facility])        Patient Active Problem List    Diagnosis Date Noted    SRIDEVI (acute kidney injury) ([facility]) 11/24/2024    Syncope and collapse 11/03/2024    Smoker 09/27/2024    Pulmonary embolism without acute cor pulmonale ([facility]) 09/23/2024    Deep venous thrombosis ([facility]) 09/23/2024    Chest pain 09/23/2024    Shortness of breath 09/23/2024    Shock circulatory

## 2024-11-24 NOTE — ED NOTES
Per dr kent pt not getting a CTA tonight due to repeat labs still not being good enough for contrast

## 2024-11-25 LAB
ALBUMIN: 3.3 G/DL (ref 3.5–5.2)
ANION GAP SERPL CALCULATED.3IONS-SCNC: 6 MMOL/L (ref 7–16)
BUN SERPL-MCNC: 17 MG/DL (ref 6–23)
CALCIUM SERPL-MCNC: 8.4 MG/DL (ref 8.6–10.2)
CHLORIDE SERPL-SCNC: 114 MMOL/L (ref 98–107)
CO2 SERPL-SCNC: 23 MMOL/L (ref 22–29)
CREAT SERPL-MCNC: 1 MG/DL (ref 0.5–1)
EKG ATRIAL RATE: 60 BPM
EKG ATRIAL RATE: 95 BPM
EKG P AXIS: 65 DEGREES
EKG P AXIS: 69 DEGREES
EKG P-R INTERVAL: 136 MS
EKG P-R INTERVAL: 144 MS
EKG Q-T INTERVAL: 354 MS
EKG Q-T INTERVAL: 432 MS
EKG QRS DURATION: 80 MS
EKG QRS DURATION: 84 MS
EKG QTC CALCULATION (BAZETT): 432 MS
EKG QTC CALCULATION (BAZETT): 444 MS
EKG R AXIS: 10 DEGREES
EKG R AXIS: 37 DEGREES
EKG T AXIS: 38 DEGREES
EKG T AXIS: 60 DEGREES
EKG VENTRICULAR RATE: 60 BPM
EKG VENTRICULAR RATE: 95 BPM
GFR, ESTIMATED: 65 ML/MIN/1.73M2
GLUCOSE SERPL-MCNC: 95 MG/DL (ref 74–99)
PARTIAL THROMBOPLASTIN TIME: 90.7 SEC (ref 24.5–35.1)
PARTIAL THROMBOPLASTIN TIME: 98 SEC (ref 24.5–35.1)
PHOSPHATE SERPL-MCNC: 3 MG/DL (ref 2.5–4.5)
POTASSIUM SERPL-SCNC: 4.1 MMOL/L (ref 3.5–5)
SODIUM SERPL-SCNC: 143 MMOL/L (ref 132–146)

## 2024-11-25 PROCEDURE — 99232 SBSQ HOSP IP/OBS MODERATE 35: CPT | Performed by: FAMILY MEDICINE

## 2024-11-25 PROCEDURE — 2580000003 HC RX 258: Performed by: FAMILY MEDICINE

## 2024-11-25 PROCEDURE — 36415 COLL VENOUS BLD VENIPUNCTURE: CPT

## 2024-11-25 PROCEDURE — 6370000000 HC RX 637 (ALT 250 FOR IP): Performed by: FAMILY MEDICINE

## 2024-11-25 PROCEDURE — 80069 RENAL FUNCTION PANEL: CPT

## 2024-11-25 PROCEDURE — 1200000000 HC SEMI PRIVATE

## 2024-11-25 PROCEDURE — 93010 ELECTROCARDIOGRAM REPORT: CPT | Performed by: INTERNAL MEDICINE

## 2024-11-25 PROCEDURE — 6360000002 HC RX W HCPCS: Performed by: FAMILY MEDICINE

## 2024-11-25 PROCEDURE — 85730 THROMBOPLASTIN TIME PARTIAL: CPT

## 2024-11-25 RX ADMIN — MORPHINE SULFATE 15 MG: 15 TABLET, FILM COATED, EXTENDED RELEASE ORAL at 13:59

## 2024-11-25 RX ADMIN — SODIUM CHLORIDE: 9 INJECTION, SOLUTION INTRAVENOUS at 04:29

## 2024-11-25 RX ADMIN — TOPIRAMATE 50 MG: 25 TABLET, FILM COATED ORAL at 08:26

## 2024-11-25 RX ADMIN — MORPHINE SULFATE 7.5 MG: 15 TABLET ORAL at 03:30

## 2024-11-25 RX ADMIN — TOPIRAMATE 50 MG: 25 TABLET, FILM COATED ORAL at 20:46

## 2024-11-25 RX ADMIN — OXYCODONE AND ACETAMINOPHEN 1 TABLET: 5; 325 TABLET ORAL at 20:46

## 2024-11-25 RX ADMIN — ALPRAZOLAM 0.5 MG: 0.5 TABLET ORAL at 08:27

## 2024-11-25 RX ADMIN — QUETIAPINE FUMARATE 300 MG: 100 TABLET ORAL at 23:23

## 2024-11-25 RX ADMIN — ISOSORBIDE MONONITRATE 60 MG: 60 TABLET, EXTENDED RELEASE ORAL at 08:26

## 2024-11-25 RX ADMIN — ALPRAZOLAM 0.5 MG: 0.5 TABLET ORAL at 20:53

## 2024-11-25 RX ADMIN — Medication 10 ML: at 20:54

## 2024-11-25 RX ADMIN — HEPARIN SODIUM AND DEXTROSE 12 UNITS/KG/HR: 10000; 5 INJECTION INTRAVENOUS at 16:32

## 2024-11-25 RX ADMIN — FOLIC ACID 1 MG: 1 TABLET ORAL at 08:27

## 2024-11-25 RX ADMIN — OXYCODONE AND ACETAMINOPHEN 1 TABLET: 5; 325 TABLET ORAL at 16:27

## 2024-11-25 RX ADMIN — METOPROLOL SUCCINATE 25 MG: 25 TABLET, EXTENDED RELEASE ORAL at 08:26

## 2024-11-25 RX ADMIN — SODIUM CHLORIDE: 9 INJECTION, SOLUTION INTRAVENOUS at 14:02

## 2024-11-25 RX ADMIN — Medication 10 ML: at 08:28

## 2024-11-25 RX ADMIN — OXYCODONE AND ACETAMINOPHEN 1 TABLET: 5; 325 TABLET ORAL at 04:25

## 2024-11-25 RX ADMIN — ATORVASTATIN CALCIUM 40 MG: 40 TABLET, FILM COATED ORAL at 20:46

## 2024-11-25 RX ADMIN — OXYCODONE AND ACETAMINOPHEN 1 TABLET: 5; 325 TABLET ORAL at 12:26

## 2024-11-25 RX ADMIN — MORPHINE SULFATE 7.5 MG: 15 TABLET ORAL at 17:24

## 2024-11-25 RX ADMIN — OXYCODONE AND ACETAMINOPHEN 1 TABLET: 5; 325 TABLET ORAL at 08:26

## 2024-11-25 RX ADMIN — ASPIRIN 81 MG: 81 TABLET, COATED ORAL at 08:26

## 2024-11-25 RX ADMIN — MORPHINE SULFATE 7.5 MG: 15 TABLET ORAL at 10:23

## 2024-11-25 ASSESSMENT — PAIN DESCRIPTION - DESCRIPTORS
DESCRIPTORS: ACHING;SORE;THROBBING
DESCRIPTORS: ACHING;DISCOMFORT;SORE
DESCRIPTORS: ACHING;DISCOMFORT;SORE
DESCRIPTORS: ACHING;SORE;DISCOMFORT
DESCRIPTORS: ACHING;SORE;DISCOMFORT
DESCRIPTORS: ACHING;DULL;DISCOMFORT
DESCRIPTORS: ACHING;CRAMPING;DISCOMFORT
DESCRIPTORS: ACHING;CRAMPING;THROBBING
DESCRIPTORS: ACHING;SORE;DISCOMFORT
DESCRIPTORS: ACHING;CRAMPING;THROBBING

## 2024-11-25 ASSESSMENT — PAIN SCALES - GENERAL
PAINLEVEL_OUTOF10: 0
PAINLEVEL_OUTOF10: 7
PAINLEVEL_OUTOF10: 0
PAINLEVEL_OUTOF10: 8
PAINLEVEL_OUTOF10: 8
PAINLEVEL_OUTOF10: 0
PAINLEVEL_OUTOF10: 8
PAINLEVEL_OUTOF10: 10
PAINLEVEL_OUTOF10: 5
PAINLEVEL_OUTOF10: 8
PAINLEVEL_OUTOF10: 6
PAINLEVEL_OUTOF10: 10
PAINLEVEL_OUTOF10: 6
PAINLEVEL_OUTOF10: 0
PAINLEVEL_OUTOF10: 0

## 2024-11-25 ASSESSMENT — PAIN DESCRIPTION - LOCATION
LOCATION: KNEE
LOCATION: KNEE
LOCATION: BACK
LOCATION: BACK
LOCATION: KNEE
LOCATION: LEG
LOCATION: KNEE
LOCATION: BACK;LEG

## 2024-11-25 ASSESSMENT — PAIN DESCRIPTION - ORIENTATION
ORIENTATION: RIGHT

## 2024-11-25 ASSESSMENT — PAIN SCALES - WONG BAKER: WONGBAKER_NUMERICALRESPONSE: NO HURT

## 2024-11-25 NOTE — ACP (ADVANCE CARE PLANNING)
Advance Care Planning     The patient has appointed the following active healthcare agents:    Primary Decision Maker: Abdiaziz Arcos - Brother/Sister - [phone]    The Patient has the following current code status:    Code Status: Full Code

## 2024-11-25 NOTE — CARE COORDINATION
Case Management Assessment  Initial Evaluation    Date/Time of Evaluation: 11/25/2024 11:23 AM  Assessment Completed by: CRYSTAL Hines    If patient is discharged prior to next notation, then this note serves as note for discharge by case management.    Patient Name: Niru Chaves                   YOB: 1960  Diagnosis: SRIDEVI (acute kidney injury) (HCC) [N17.9]  Acute renal failure, unspecified acute renal failure type (HCC) [N17.9]  Chronic deep vein thrombosis (DVT) of distal vein of right lower extremity (HCC) [I82.5Z1]  Chest pain, unspecified type [R07.9]                   Date / Time: 11/23/2024  8:26 PM    Patient Admission Status: Inpatient   Readmission Risk (Low < 19, Mod (19-27), High > 27): Readmission Risk Score: 18.6    Current PCP: Crystal Paulino MD  PCP verified by CM? Yes    Chart Reviewed: Yes      History Provided by: Patient  Patient Orientation: Alert and Oriented, Person, Place, Situation    Patient Cognition: Alert    Hospitalization in the last 30 days (Readmission):  Yes    If yes, Readmission Assessment in CM Navigator will be completed.    Advance Directives:      Code Status: Full Code   Patient's Primary Decision Maker is: Legal Next of Kin (Crystal Paulino in [place])    Primary Decision Maker: MayAbdiaziz - Brother/Sister - [phone]    Discharge Planning:    Patient lives with: staying with ex spouse Jimmy Wilder in [place] during the week.  Type of Home: House    Primary Care Giver: Self    Patient Support Systems include: Family Members (pt independant, stays with ex spouse in [place] during week.)     Current Financial resources:    Current community resources:    Current services prior to admission: None            Current DME:  none is independent.             Type of Home Care services:  None    ADLS  Prior functional level: Independent in ADLs/IADLs  Current functional level: Independent in ADLs/IADLs    PT AM-PAC:   /24  OT AM-PAC:   /24    Family can provide

## 2024-11-25 NOTE — PLAN OF CARE
Problem: Chronic Conditions and Co-morbidities  Goal: Patient's chronic conditions and co-morbidity symptoms are monitored and maintained or improved  11/25/2024 1157 by Annette Porter RN  Outcome: Progressing  11/24/2024 2244 by JUSTIN Roa RN  Outcome: Progressing     Problem: Pain  Goal: Verbalizes/displays adequate comfort level or baseline comfort level  11/25/2024 1157 by Annette Porter RN  Outcome: Progressing  11/24/2024 2244 by JUSTIN Roa RN  Outcome: Progressing     Problem: Safety - Adult  Goal: Free from fall injury  11/25/2024 1157 by Annette Porter RN  Outcome: Progressing  11/24/2024 2244 by JUSTIN Roa RN  Outcome: Progressing     Problem: ABCDS Injury Assessment  Goal: Absence of physical injury  Outcome: Progressing

## 2024-11-25 NOTE — PLAN OF CARE
Problem: Chronic Conditions and Co-morbidities  Goal: Patient's chronic conditions and co-morbidity symptoms are monitored and maintained or improved  11/24/2024 2244 by JUSTIN Roa RN  Outcome: Progressing  11/24/2024 1303 by Zhane Ybarra RN  Outcome: Progressing     Problem: Pain  Goal: Verbalizes/displays adequate comfort level or baseline comfort level  11/24/2024 2244 by JUSTIN Roa RN  Outcome: Progressing  11/24/2024 1303 by Zhane Ybarra RN  Outcome: Progressing     Problem: Safety - Adult  Goal: Free from fall injury  Outcome: Progressing

## 2024-11-26 LAB
ANION GAP SERPL CALCULATED.3IONS-SCNC: 10 MMOL/L (ref 7–16)
BUN SERPL-MCNC: 15 MG/DL (ref 6–23)
CALCIUM SERPL-MCNC: 8.5 MG/DL (ref 8.6–10.2)
CARDIOLIPIN IGA SER IA-ACNC: 1.4 APL (ref 0–14)
CARDIOLIPIN IGG SER IA-ACNC: 7.8 GPL (ref 0–10)
CARDIOLIPIN IGM SER IA-ACNC: 3.6 MPL (ref 0–10)
CHLORIDE SERPL-SCNC: 113 MMOL/L (ref 98–107)
CO2 SERPL-SCNC: 18 MMOL/L (ref 22–29)
CREAT SERPL-MCNC: 1 MG/DL (ref 0.5–1)
DILUTE RUSSELL VIPER VENOM TIME: ABNORMAL
ERYTHROCYTE [DISTWIDTH] IN BLOOD BY AUTOMATED COUNT: 14.2 % (ref 11.5–15)
GFR, ESTIMATED: 66 ML/MIN/1.73M2
GLUCOSE SERPL-MCNC: 98 MG/DL (ref 74–99)
HCT VFR BLD AUTO: 29.6 % (ref 34–48)
HGB BLD-MCNC: 9.8 G/DL (ref 11.5–15.5)
INR PPP: 1.2
MCH RBC QN AUTO: 29.1 PG (ref 26–35)
MCHC RBC AUTO-ENTMCNC: 33.1 G/DL (ref 32–34.5)
MCV RBC AUTO: 87.8 FL (ref 80–99.9)
PLATELET # BLD AUTO: 169 K/UL (ref 130–450)
PMV BLD AUTO: 9.3 FL (ref 7–12)
POTASSIUM SERPL-SCNC: 3.6 MMOL/L (ref 3.5–5)
PROTHROMBIN TIME: 12.6 SEC (ref 9.3–12.4)
RBC # BLD AUTO: 3.37 M/UL (ref 3.5–5.5)
SODIUM SERPL-SCNC: 141 MMOL/L (ref 132–146)
WBC OTHER # BLD: 3.9 K/UL (ref 4.5–11.5)

## 2024-11-26 PROCEDURE — 2580000003 HC RX 258: Performed by: FAMILY MEDICINE

## 2024-11-26 PROCEDURE — 85610 PROTHROMBIN TIME: CPT

## 2024-11-26 PROCEDURE — 85027 COMPLETE CBC AUTOMATED: CPT

## 2024-11-26 PROCEDURE — 36415 COLL VENOUS BLD VENIPUNCTURE: CPT

## 2024-11-26 PROCEDURE — 99232 SBSQ HOSP IP/OBS MODERATE 35: CPT | Performed by: FAMILY MEDICINE

## 2024-11-26 PROCEDURE — 6370000000 HC RX 637 (ALT 250 FOR IP): Performed by: FAMILY MEDICINE

## 2024-11-26 PROCEDURE — 80048 BASIC METABOLIC PNL TOTAL CA: CPT

## 2024-11-26 PROCEDURE — 1200000000 HC SEMI PRIVATE

## 2024-11-26 PROCEDURE — 6360000002 HC RX W HCPCS: Performed by: FAMILY MEDICINE

## 2024-11-26 RX ORDER — SENNOSIDES A AND B 8.6 MG/1
1 TABLET, FILM COATED ORAL 2 TIMES DAILY
Status: DISCONTINUED | OUTPATIENT
Start: 2024-11-26 | End: 2024-11-27 | Stop reason: HOSPADM

## 2024-11-26 RX ORDER — ENOXAPARIN SODIUM 100 MG/ML
1 INJECTION SUBCUTANEOUS 2 TIMES DAILY
Status: DISCONTINUED | OUTPATIENT
Start: 2024-11-26 | End: 2024-11-27 | Stop reason: HOSPADM

## 2024-11-26 RX ORDER — WARFARIN SODIUM 7.5 MG/1
7.5 TABLET ORAL
Status: COMPLETED | OUTPATIENT
Start: 2024-11-26 | End: 2024-11-26

## 2024-11-26 RX ADMIN — ATORVASTATIN CALCIUM 40 MG: 40 TABLET, FILM COATED ORAL at 21:36

## 2024-11-26 RX ADMIN — METOPROLOL SUCCINATE 25 MG: 25 TABLET, EXTENDED RELEASE ORAL at 08:53

## 2024-11-26 RX ADMIN — TOPIRAMATE 50 MG: 25 TABLET, FILM COATED ORAL at 21:36

## 2024-11-26 RX ADMIN — MORPHINE SULFATE 15 MG: 15 TABLET, FILM COATED, EXTENDED RELEASE ORAL at 02:22

## 2024-11-26 RX ADMIN — SODIUM CHLORIDE: 9 INJECTION, SOLUTION INTRAVENOUS at 00:35

## 2024-11-26 RX ADMIN — OXYCODONE AND ACETAMINOPHEN 1 TABLET: 5; 325 TABLET ORAL at 04:43

## 2024-11-26 RX ADMIN — ALPRAZOLAM 0.5 MG: 0.5 TABLET ORAL at 08:54

## 2024-11-26 RX ADMIN — ISOSORBIDE MONONITRATE 60 MG: 60 TABLET, EXTENDED RELEASE ORAL at 08:53

## 2024-11-26 RX ADMIN — WARFARIN SODIUM 7.5 MG: 7.5 TABLET ORAL at 17:05

## 2024-11-26 RX ADMIN — TOPIRAMATE 50 MG: 25 TABLET, FILM COATED ORAL at 08:54

## 2024-11-26 RX ADMIN — ENOXAPARIN SODIUM 70 MG: 100 INJECTION SUBCUTANEOUS at 21:37

## 2024-11-26 RX ADMIN — OXYCODONE AND ACETAMINOPHEN 1 TABLET: 5; 325 TABLET ORAL at 13:01

## 2024-11-26 RX ADMIN — MORPHINE SULFATE 7.5 MG: 15 TABLET ORAL at 10:08

## 2024-11-26 RX ADMIN — FOLIC ACID 1 MG: 1 TABLET ORAL at 08:53

## 2024-11-26 RX ADMIN — SENNOSIDES 8.6 MG: 8.6 TABLET, FILM COATED ORAL at 21:36

## 2024-11-26 RX ADMIN — OXYCODONE AND ACETAMINOPHEN 1 TABLET: 5; 325 TABLET ORAL at 08:53

## 2024-11-26 RX ADMIN — ALPRAZOLAM 0.5 MG: 0.5 TABLET ORAL at 21:36

## 2024-11-26 RX ADMIN — MORPHINE SULFATE 15 MG: 15 TABLET, FILM COATED, EXTENDED RELEASE ORAL at 14:03

## 2024-11-26 RX ADMIN — ENOXAPARIN SODIUM 70 MG: 100 INJECTION SUBCUTANEOUS at 10:08

## 2024-11-26 RX ADMIN — OXYCODONE AND ACETAMINOPHEN 1 TABLET: 5; 325 TABLET ORAL at 21:36

## 2024-11-26 RX ADMIN — SENNOSIDES 8.6 MG: 8.6 TABLET, FILM COATED ORAL at 10:08

## 2024-11-26 RX ADMIN — OXYCODONE AND ACETAMINOPHEN 1 TABLET: 5; 325 TABLET ORAL at 00:33

## 2024-11-26 RX ADMIN — Medication 10 ML: at 08:55

## 2024-11-26 RX ADMIN — SODIUM CHLORIDE: 9 INJECTION, SOLUTION INTRAVENOUS at 13:02

## 2024-11-26 RX ADMIN — OXYCODONE AND ACETAMINOPHEN 1 TABLET: 5; 325 TABLET ORAL at 17:04

## 2024-11-26 RX ADMIN — ASPIRIN 81 MG: 81 TABLET, COATED ORAL at 08:54

## 2024-11-26 ASSESSMENT — PAIN DESCRIPTION - DESCRIPTORS
DESCRIPTORS: ACHING;SORE;DISCOMFORT
DESCRIPTORS: ACHING;CRAMPING;SHOOTING
DESCRIPTORS: ACHING;DULL;JABBING
DESCRIPTORS: ACHING;THROBBING;SORE
DESCRIPTORS: ACHING;CRAMPING;THROBBING
DESCRIPTORS: ACHING;THROBBING;SORE
DESCRIPTORS: ACHING;SORE;THROBBING

## 2024-11-26 ASSESSMENT — PAIN DESCRIPTION - ORIENTATION
ORIENTATION: RIGHT

## 2024-11-26 ASSESSMENT — PAIN DESCRIPTION - LOCATION
LOCATION: LEG
LOCATION: KNEE

## 2024-11-26 ASSESSMENT — PAIN - FUNCTIONAL ASSESSMENT
PAIN_FUNCTIONAL_ASSESSMENT: PREVENTS OR INTERFERES SOME ACTIVE ACTIVITIES AND ADLS

## 2024-11-26 ASSESSMENT — PAIN SCALES - GENERAL
PAINLEVEL_OUTOF10: 6
PAINLEVEL_OUTOF10: 0
PAINLEVEL_OUTOF10: 8
PAINLEVEL_OUTOF10: 8
PAINLEVEL_OUTOF10: 0
PAINLEVEL_OUTOF10: 8
PAINLEVEL_OUTOF10: 2
PAINLEVEL_OUTOF10: 0
PAINLEVEL_OUTOF10: 8

## 2024-11-26 ASSESSMENT — PAIN SCALES - WONG BAKER
WONGBAKER_NUMERICALRESPONSE: NO HURT
WONGBAKER_NUMERICALRESPONSE: NO HURT

## 2024-11-26 NOTE — PLAN OF CARE
Problem: Chronic Conditions and Co-morbidities  Goal: Patient's chronic conditions and co-morbidity symptoms are monitored and maintained or improved  11/26/2024 1145 by Annette Porter RN  Outcome: Progressing  11/26/2024 0221 by Radha Lelsie RN  Outcome: Progressing     Problem: Pain  Goal: Verbalizes/displays adequate comfort level or baseline comfort level  11/26/2024 1145 by Annette Porter RN  Outcome: Progressing  11/26/2024 0221 by Radha Leslie RN  Outcome: Progressing     Problem: Safety - Adult  Goal: Free from fall injury  11/26/2024 1145 by Annette Porter RN  Outcome: Progressing  11/26/2024 0221 by Radha Leslie RN  Outcome: Progressing     Problem: ABCDS Injury Assessment  Goal: Absence of physical injury  11/26/2024 1145 by Annette Porter RN  Outcome: Progressing  11/26/2024 0221 by Radha Leslie RN  Outcome: Progressing

## 2024-11-26 NOTE — CARE COORDINATION
Ss note:11/26/2024 12:20 PM Plan is for pt to discharge to ex spouse home at [address] in [place] at discharge as pt helps care for ex spouse during the week. Pt resides in [facility] on weekends. PCP is Crystal Paulino in [facility]. Per IDR pt will need Lovenox teaching before discharge and will need to follow up with her PCP for outpt INR. Pt relays she uses our [place] outpt pharm, relays she will need a Taxi home as she has not available transport at discharge. SW will follow. CRYSTAL Graf

## 2024-11-27 VITALS
WEIGHT: 153 LBS | HEART RATE: 66 BPM | SYSTOLIC BLOOD PRESSURE: 140 MMHG | TEMPERATURE: 98.4 F | BODY MASS INDEX: 24.01 KG/M2 | RESPIRATION RATE: 20 BRPM | HEIGHT: 67 IN | DIASTOLIC BLOOD PRESSURE: 72 MMHG | OXYGEN SATURATION: 99 %

## 2024-11-27 LAB
ANION GAP SERPL CALCULATED.3IONS-SCNC: 6 MMOL/L (ref 7–16)
BUN SERPL-MCNC: 12 MG/DL (ref 6–23)
CALCIUM SERPL-MCNC: 8.7 MG/DL (ref 8.6–10.2)
CHLORIDE SERPL-SCNC: 117 MMOL/L (ref 98–107)
CO2 SERPL-SCNC: 20 MMOL/L (ref 22–29)
CREAT SERPL-MCNC: 1 MG/DL (ref 0.5–1)
ERYTHROCYTE [DISTWIDTH] IN BLOOD BY AUTOMATED COUNT: 14.2 % (ref 11.5–15)
GFR, ESTIMATED: 64 ML/MIN/1.73M2
GLUCOSE SERPL-MCNC: 82 MG/DL (ref 74–99)
HCT VFR BLD AUTO: 31.8 % (ref 34–48)
HGB BLD-MCNC: 10.4 G/DL (ref 11.5–15.5)
INR PPP: 1.2
MCH RBC QN AUTO: 29.4 PG (ref 26–35)
MCHC RBC AUTO-ENTMCNC: 32.7 G/DL (ref 32–34.5)
MCV RBC AUTO: 89.8 FL (ref 80–99.9)
PLATELET # BLD AUTO: 185 K/UL (ref 130–450)
PMV BLD AUTO: 9.3 FL (ref 7–12)
POTASSIUM SERPL-SCNC: 4.2 MMOL/L (ref 3.5–5)
PROTHROMBIN TIME: 13.2 SEC (ref 9.3–12.4)
RBC # BLD AUTO: 3.54 M/UL (ref 3.5–5.5)
SODIUM SERPL-SCNC: 143 MMOL/L (ref 132–146)
WBC OTHER # BLD: 3.6 K/UL (ref 4.5–11.5)

## 2024-11-27 PROCEDURE — 80048 BASIC METABOLIC PNL TOTAL CA: CPT

## 2024-11-27 PROCEDURE — 6370000000 HC RX 637 (ALT 250 FOR IP): Performed by: FAMILY MEDICINE

## 2024-11-27 PROCEDURE — 36415 COLL VENOUS BLD VENIPUNCTURE: CPT

## 2024-11-27 PROCEDURE — 6360000002 HC RX W HCPCS: Performed by: FAMILY MEDICINE

## 2024-11-27 PROCEDURE — 85027 COMPLETE CBC AUTOMATED: CPT

## 2024-11-27 PROCEDURE — 99239 HOSP IP/OBS DSCHRG MGMT >30: CPT | Performed by: FAMILY MEDICINE

## 2024-11-27 PROCEDURE — 85610 PROTHROMBIN TIME: CPT

## 2024-11-27 RX ORDER — WARFARIN SODIUM 7.5 MG/1
7.5 TABLET ORAL NIGHTLY
Qty: 30 TABLET | Refills: 5 | Status: SHIPPED | OUTPATIENT
Start: 2024-11-27

## 2024-11-27 RX ORDER — ENOXAPARIN SODIUM 100 MG/ML
1 INJECTION SUBCUTANEOUS 2 TIMES DAILY
Qty: 24 ML | Refills: 0 | Status: SHIPPED | OUTPATIENT
Start: 2024-11-27 | End: 2024-12-12

## 2024-11-27 RX ADMIN — OXYCODONE AND ACETAMINOPHEN 1 TABLET: 5; 325 TABLET ORAL at 06:21

## 2024-11-27 RX ADMIN — MORPHINE SULFATE 7.5 MG: 15 TABLET ORAL at 08:04

## 2024-11-27 RX ADMIN — ALPRAZOLAM 0.5 MG: 0.5 TABLET ORAL at 09:43

## 2024-11-27 RX ADMIN — MORPHINE SULFATE 15 MG: 15 TABLET, FILM COATED, EXTENDED RELEASE ORAL at 01:12

## 2024-11-27 RX ADMIN — ISOSORBIDE MONONITRATE 60 MG: 60 TABLET, EXTENDED RELEASE ORAL at 08:05

## 2024-11-27 RX ADMIN — OXYCODONE AND ACETAMINOPHEN 1 TABLET: 5; 325 TABLET ORAL at 10:45

## 2024-11-27 RX ADMIN — ASPIRIN 81 MG: 81 TABLET, COATED ORAL at 08:05

## 2024-11-27 RX ADMIN — METOPROLOL SUCCINATE 25 MG: 25 TABLET, EXTENDED RELEASE ORAL at 08:05

## 2024-11-27 RX ADMIN — ENOXAPARIN SODIUM 70 MG: 100 INJECTION SUBCUTANEOUS at 08:06

## 2024-11-27 RX ADMIN — FOLIC ACID 1 MG: 1 TABLET ORAL at 08:05

## 2024-11-27 RX ADMIN — OXYCODONE AND ACETAMINOPHEN 1 TABLET: 5; 325 TABLET ORAL at 01:11

## 2024-11-27 RX ADMIN — ONDANSETRON 4 MG: 4 TABLET, ORALLY DISINTEGRATING ORAL at 01:11

## 2024-11-27 RX ADMIN — TOPIRAMATE 50 MG: 25 TABLET, FILM COATED ORAL at 08:04

## 2024-11-27 RX ADMIN — QUETIAPINE FUMARATE 300 MG: 100 TABLET ORAL at 01:10

## 2024-11-27 RX ADMIN — SENNOSIDES 8.6 MG: 8.6 TABLET, FILM COATED ORAL at 08:05

## 2024-11-27 ASSESSMENT — PAIN DESCRIPTION - DESCRIPTORS
DESCRIPTORS: ACHING;TENDER;DISCOMFORT
DESCRIPTORS: ACHING;SORE;TENDER
DESCRIPTORS: ACHING;CRAMPING;DISCOMFORT

## 2024-11-27 ASSESSMENT — PAIN DESCRIPTION - LOCATION
LOCATION: KNEE
LOCATION: KNEE
LOCATION: KNEE;LEG

## 2024-11-27 ASSESSMENT — PAIN DESCRIPTION - ORIENTATION
ORIENTATION: RIGHT

## 2024-11-27 ASSESSMENT — PAIN SCALES - GENERAL
PAINLEVEL_OUTOF10: 0
PAINLEVEL_OUTOF10: 7
PAINLEVEL_OUTOF10: 8
PAINLEVEL_OUTOF10: 6
PAINLEVEL_OUTOF10: 6

## 2024-11-27 NOTE — DISCHARGE SUMMARY
adjustment of the mA/kV was utilized to reduce the radiation dose to as low as reasonably achievable. COMPARISON: None. HISTORY: ORDERING SYSTEM PROVIDED HISTORY: acute renal failure TECHNOLOGIST PROVIDED HISTORY: Additional Contrast?->None Reason for exam:->acute renal failure Decision Support Exception - unselect if not a suspected or confirmed emergency medical condition->Emergency Medical Condition (MA) FINDINGS: LOWER CHEST: Visualized lung bases are clear. LIVER: Unremarkable. BILIARY: No biliary dilatation status post cholecystectomy. SPLEEN: Unremarkable. PANCREAS: Grossly unremarkable. ADRENALS: Unremarkable. KIDNEYS: No hydronephrosis.  Tiny nonobstructive left renal calculus. GI: No obstruction.  Appendix not identified however there is no pericecal inflammatory change evident.  No pneumoperitoneum. LYMPH NODES: No significant lymphadenopathy. VESSELS: Abdominal aorta is nonaneurysmal with mild to moderate calcification. PELVIS: Unremarkable bladder.  Uterus is absent. BONES: T10 hemangioma with a mild compression deformity. ADDITIONAL FINDINGS: None.     No acute intra-abdominal pelvic process appreciated. Tiny nonobstructive left renal calculus.  No hydronephrosis. Additional observations as above.     Vascular duplex lower extremity venous right    Result Date: 11/23/2024  EXAMINATION: DUPLEX VENOUS ULTRASOUND OF THE RIGHT LOWER EXTREMITY 11/23/2024 10:11 pm TECHNIQUE: Duplex ultrasound using B-mode/gray scaled imaging and Doppler spectral analysis and color flow was obtained of the deep venous structures of the right extremity. COMPARISON: 09/22/2024 HISTORY: ORDERING SYSTEM PROVIDED HISTORY: Edema right entire leg TECHNOLOGIST PROVIDED HISTORY: What reading provider will be dictating this exam?->CRC FINDINGS: There is again a fairly extensive nonocclusive thrombus identified within the right lower extremity veins, including the external iliac vein, common femoral vein, femoral vein, and popliteal

## 2024-11-27 NOTE — ACP (ADVANCE CARE PLANNING)
Ss note:11/27/2024  10:02 AM discharge order noted. Awaiting Lovenox and Coumadin to be provided to pt from Meds to beds. Follow up visit to room, confirmed address pt is discharging to, provided a taxi voucher to unit secretary. Pt will need her bagged clothing brought up from ED, nursing aware. CRYSTAL Graf

## 2024-11-27 NOTE — CARE COORDINATION
"  History   Chief Complaint:  Agitation and Psychiatric Evaluation ([place] in and out of traffic. Pt states \"trying to scare drivers\" arrived on  hold)       HPI limited by patient's psychiatric state.       Mendy Feliz is a 49 year old male with history of methamphetamine and chemical dependence who presents with agitation and psychiatric evaluation. The patient claims that he was [place] the airport because he was jumping in and out of the traffic for exercise. He denies suicidal behavior and reports to see random images in his head. He also states that he used marijuana last night and had sex with a women at the airport today. He also believes that he \"lives everywhere\". Denies use of methamphetamine and alcohol.    Review of Systems   Unable to perform ROS: Psychiatric disorder     Allergies:  Shrimp  Penicillins  Sulfa Drugs    Medications:  Depakote  Zyprexa  Vibramycin    Past Medical History:     Bipolar affective disorder   Chemical dependency  Methamphetamine dependence    Social History:  Admits to marijuana use  The patient presents alone.    Physical Exam     Patient Vitals for the past 24 hrs:   BP Temp Temp src Pulse SpO2 Height Weight   06/15/22 0001 108/61 -- -- 83 96 % -- --   06/14/22 2349 104/71 -- -- 89 96 % -- --   06/14/22 2257 117/82 -- -- 82 -- -- --   06/14/22 2221 115/73 99.8  F (37.7  C) Temporal 87 98 % 1.727 m (5' 8\") 83.5 kg (184 lb)       Physical Exam  General: Lying on bed  Eyes:  The pupils are equal and round    Conjunctivae and sclerae are normal  ENT:    No head trauma  Neck:  Normal range of motion  CV:  Regular rate     Skin warm and well perfused   Resp:  Non labored breathing on room air    No tachypnea    No cough heard  MS:  Normal muscular tone  Skin:  No rash or acute skin lesions noted  Neuro:   Awake, alert.      Speech is normal and fluent.    Face is symmetric.     Moves all extremities equally  Psych:  Pressured " 11/27/2024 1017 CM note: Per [facility] outpt pharmacy, lovenox does not require prior auth and copay is $0.00. Pharmacy will deliver filled rx to pt prior to d/c. Nursing informed of need for lovenox teaching. Kemal IZAGUIRRE   speech, hypersexual, mildly agitated/raised voice at times    Emergency Department Course     Emergency Department Course:    Within an hour after restraint an in person face to face assessment was completed at 2318, including an evaluation of the patient's immediate reaction to the intervention, behavioral assessment and review/assessment of history, drugs and medications, recent labs, etc., and behavioral condition.  The patient experienced: No adverse physical outcome from seclusion/restraint initiation.  The intervention of restraint or seclusion needs to continue.    0142  The patient is out of restraint.    Reviewed:  I reviewed nursing notes, vitals and past medical history    Assessments:  2224 I obtained history and examined the patient as noted above.   0136 I rechecked the patient and explained findings.     Disposition:  Pending    Impression & Plan     Medical Decision Making:  Mendy Feliz is a 49-year-old male who presented to the emergency department with agitation.  Patient has a history of bipolar disorder.  He seems hypomanic/manic here in the emergency department.  Admits to marijuana use earlier tonight.  Patient agreed to oral Zyprexa.  Patient did become more agitated, banging on doors, yelling requiring IM zyprexa and restraints. Able to be taken out of restraints once asleep. Will need DEC assessment likely in ED once awake. Patient signed out to Dr. Davila pending DEC assessment.      Diagnosis:    ICD-10-CM    1. Marijuana use  F12.90    2. Bipolar affective disorder, current episode manic, current episode severity unspecified (H)  F31.10        Scribe Disclosure:  HOLLI MARTINEZ, am serving as a scribe at 10:24 PM on 6/14/2022 to document services personally performed by Bailey Carreno MD based on my observations and the provider's statements to me.            Bailey Carreno MD  06/15/22 [id number]

## 2024-11-27 NOTE — DISCHARGE INSTRUCTIONS
Your information:  Name: Niru Chaves  : 1960    Your instructions:    IF YOU EXPERIENCE ANY OF THE FOLLOWING SYMPTOMS, CHEST PAIN, PRESSURE, TIGHTNESS, SHORTNESS OF BREATH, PAIN THAT STARTS IN THE CHEST AND MOVES TO THE BACK, ARMS OR JAW, CHEST PAIN WITH NAUSEA, VOMITING OR SWEATING, GENERAL NOT FEELING WELL, SIGNS AND SYMPTOMS OF INFECTION LIKE FEVER AND OR CHILLS, PLEASE CALL THE DOCTOR OR RETURN TO THE EMERGENCY ROOM    What to do after you leave the hospital:    Recommended diet: regular diet    Recommended activity: activity as tolerated        The following personal items were collected during your admission and were returned to you:    Belongings  Dental Appliances: None  Vision - Corrective Lenses: None  Hearing Aid: None  Clothing: Other (Comment) (left at ED)  Jewelry: None  Body Piercings Removed: N/A  Electronic Devices: Tablet  Weapons (Notify Protective Services/Security): None  Other Valuables: Other (Comment)  Home Medications: None  Valuables Given To: Other (Comment) (ED)  Provide Name(s) of Who Valuable(s) Were Given To: ED  Responsible person(s) in the waiting room: NA  Patient approves for provider to speak to responsible person post operatively: No    Information obtained by:  By signing below, I understand that if any problems occur once I leave the hospital I am to contact the doctor.  I understand and acknowledge receipt of the instructions indicated above.

## 2024-11-27 NOTE — PLAN OF CARE
Problem: Chronic Conditions and Co-morbidities  Goal: Patient's chronic conditions and co-morbidity symptoms are monitored and maintained or improved  11/26/2024 2231 by Radha Leslie RN  Outcome: Progressing     Problem: Pain  Goal: Verbalizes/displays adequate comfort level or baseline comfort level  11/26/2024 2231 by Radha Leslie RN  Outcome: Progressing     Problem: Safety - Adult  Goal: Free from fall injury  11/26/2024 2231 by Radha Leslie RN  Outcome: Progressing     Problem: ABCDS Injury Assessment  Goal: Absence of physical injury  11/26/2024 2231 by Radha Leslie, RN  Outcome: Progressing

## 2024-11-27 NOTE — PROGRESS NOTES
[facility] Hospitalist Progress Note    Admitting Date and Time: 11/23/2024  8:26 PM  Admit Dx: SRIDEVI (acute kidney injury) (HCC) [N17.9]  Acute renal failure, unspecified acute renal failure type (HCC) [N17.9]  Chronic deep vein thrombosis (DVT) of distal vein of right lower extremity (HCC) [I82.5Z1]  Chest pain, unspecified type [R07.9]      Assessment:    Principal Problem:    SRIDEVI (acute kidney injury) (HCC)  Active Problems:    Chronic obstructive pulmonary disease (HCC)    Hypertension    CHF (congestive heart failure) (HCC)    Hyperlipidemia    Chronic pain    Gastroparesis    Anxiety    Deep venous thrombosis (HCC)    Shortness of breath    Infestation by bed bug  Resolved Problems:    * No resolved hospital problems. *      Plan:  11/24/2024  SRIDEVI  Right back pain  - IVF  - urine studies  - trend creatinine  - avoid nephrotoxic agents  - symptom management     Chest pain  Persistent DVT  R/o persistent PE  - hematology c/s  - reports compliance with eliquis  - vq scan     HTN  HLD  CHF without exacerbation  - continue home BP meds with hold parameters, will hold lisinopril due to SRIDEVI     Chronic back pain  - continue home pain meds     COPD without exacerbation  - duonebs    Stop Eliquis.  Hold Plavix  Heparin drip, would use for least 48 to 72 hours to dissolve much of the clot.  Await VQ scan  Check D-dimer  Percocet 5 mg every 4 hours as needed on top of her home morphine for breakthrough pain in the right leg.  Telemetry    11/25/2024  --Remains on heparin drip, follow APTT  --Appreciate hematology input, will consider this Eliquis failure and the patient will be transition to warfarin at discharge and will also need Lovenox for bridging and close follow-up with her regular PCP  --The patient is otherwise stable  --Per cardiology, okay to hold Plavix  --Pain is controlled    11/26/2024  --Remains on heparin drip, follow APTT  --Appreciate hematology input, will consider this Eliquis failure and the 
  Patient seen and examined by me.  Chart reviewed.      I read the history and physical by my night colleague.    64-year-old female with past medical history of DVT diagnosed in September 2024, she was admitted to our service with this, presented with worsening pain in the right leg.  We have found that her DVT on the right extending from the iliac down into the femoral and peroneal vein has not resolved.  Looks to be the same extension.  She was discharged on Eliquis.  She denies skipping any doses of Eliquis, states she was very compliant with it.    Upon my arrival she has no chest pain currently.  No shortness of breath.  Still with a lot of right leg pain today.    VQ scan is pending.  Patient's and renal failure so CTA of the chest was not performed last night.    Assessment:   Patient with recent diagnosis of DVT now with chest pain and right lower extremity pain, persisting right lower extremity extensive DVT despite 3 months of Eliquis therapy.  Consider probable Eliquis failure.    Plan:  Stop Eliquis.  Hold Plavix  Heparin drip, would use for least 48 to 72 hours to dissolve much of the clot.  Await VQ scan  Check D-dimer  Percocet 5 mg every 4 hours as needed on top of her home morphine for breakthrough pain in the right leg.  Telemetry    Discharge to home in about 3 days.  Full CODE STATUS.  
4 Eyes Skin Assessment     NAME:  Niru Chaves  YOB: 1960  MEDICAL RECORD NUMBER:  [id number]    The patient is being assessed for  Admission    I agree that at least one RN has performed a thorough Head to Toe Skin Assessment on the patient. ALL assessment sites listed below have been assessed.      Areas assessed by both nurses:    Head, Face, Ears, Shoulders, Back, Chest, Arms, Elbows, Hands, Sacrum. Buttock, Coccyx, Ischium, Legs. Feet and Heels, and Under Medical Devices         Does the Patient have a Wound? No noted wound(s)       Adi Prevention initiated by RN: No  Wound Care Orders initiated by RN: No    Pressure Injury (Stage 3,4, Unstageable, DTI, NWPT, and Complex wounds) if present, place Wound referral order by RN under : No    New Ostomies, if present place, Ostomy referral order under : No     Nurse 1 eSignature: Electronically signed by JUSTIN Roa RN on 11/24/24 at 4:44 AM EST    **SHARE this note so that the co-signing nurse can place an eSignature**    Nurse 2 eSignature: Electronically signed by Tiffany Roberson RN on 11/24/24 at 5:14 AM EST   
CLINICAL PHARMACY NOTE: MEDS TO BEDS    Total # of Prescriptions Filled: 2   The following medications were delivered to the patient:  Enoxaparin 80 mg/0.8 mL sosy    Additional Documentation:    
Pharmacy Consultation Note  (Warfarin Dosing and Monitoring)    Initial consult date: 11/26/24  Consulting Provider: Dr. Carol Chaves is a 64 y.o. female for whom pharmacy has been asked to manage warfarin therapy.     Weight:   Wt Readings from Last 1 Encounters:   11/23/24 69.4 kg (153 lb)       TSH:  No results found for: \"TSH\"    Hepatic Function Panel:                            Lab Results   Component Value Date/Time    ALKPHOS 62 11/23/2024 09:34 PM    ALT 8 11/23/2024 09:34 PM    AST 21 11/23/2024 09:34 PM    BILITOT 0.3 11/23/2024 09:34 PM       Current significant warfarin drug-drug interactions include: No significant interactions    Recent Labs     11/23/24  2134 11/24/24  1346 11/26/24  0614   HGB 12.9 11.2* 9.8*   PLT  --  177 169     Date Warfarin Dose INR Heparin or LMWH Comment   11/26 <7.5 mg> 1.2 Enoxaparin 1 mg/kg BID                                  Assessment:  Patient is a 64 y.o. female on warfarin for History of  VTE/PE.  Patient is a new start on warfarin, failed Eliquis therapy, was on a heparin drip and now being bridged with enoxaparin.  Goal INR 2 - 3  INR 1.2 today    Plan:  Warfarin 7.5 mg tonight  Continue enoxaparin until INR is > 2.0  Daily PT/INR until the INR is stable within the therapeutic range  Pharmacist will follow and monitor/adjust dosing as necessary    Thank you for this consult,    Olena Aranda, PharmD, BCPS 11/26/2024 12:15 PM   [phone]      [facility] [[email or web address]]: [phone]      
Spiritual Health History and Assessment/Progress Note  [facility]    Initial Encounter, Spiritual/Emotional Needs,  ,  ,      Name: Niru Chaves MRN: [id number]    Age: 64 y.o.     Sex: female   Language: English   Baptist: [employer]ism   SRIDEVI (acute kidney injury) (HCC)     Date: 11/26/2024                           Spiritual Assessment began in [facility] MED SURG TELE        Referral/Consult From: Rounding   Encounter Overview/Reason: Initial Encounter, Spiritual/Emotional Needs  Service Provided For: Patient    Felipa, Belief, Meaning:   Patient identifies as spiritual  Family/Friends No family/friends present      Importance and Influence:  Patient has spiritual/personal beliefs that influence decisions regarding their health  Family/Friends No family/friends present    Community:  Patient feels well-supported. Support system includes: Unknown  Family/Friends No family/friends present    Assessment and Plan of Care:     Patient Interventions include: Engaged in theological reflection  Family/Friends Interventions include: No family/friends present    Patient Plan of Care: Spiritual Care available upon further referral  Family/Friends Plan of Care: No family/friends present    Electronically signed by Chaplain Erik on 11/26/2024 at 11:33 AM   
chronic conditions during this admission if and where appropriate.    Subjective:  Patient is being followed for SRIDEVI (acute kidney injury) (HCC) [N17.9]  Acute renal failure, unspecified acute renal failure type (HCC) [N17.9]  Chronic deep vein thrombosis (DVT) of distal vein of right lower extremity (HCC) [I82.5Z1]  Chest pain, unspecified type [R07.9]   The patient is seen at bedside.  She is comfortable.  She has concerns about the medication she will be discharged with.  Her pain is controlled at this time.  She is at her baseline oxygen status.  No acute events overnight.  No acute complaints at this time.  She is tolerating diet.  She is voiding appropriately.  All questions and concerns were addressed during rounds.    ROS: denies fever, chills, cp, sob, n/v, HA unless stated above.      sodium chloride flush  5-40 mL IntraVENous 2 times per day    aspirin  81 mg Oral Daily    atorvastatin  40 mg Oral Nightly    [Held by provider] clopidogrel  75 mg Oral Daily    folic acid  1 mg Oral Daily    isosorbide mononitrate  60 mg Oral Daily    metoprolol succinate  25 mg Oral Daily    morphine  15 mg Oral Q12H    nicotine  1 patch TransDERmal Daily    QUEtiapine  300 mg Oral Daily    topiramate  50 mg Oral BID     sodium chloride flush, 5-40 mL, PRN  acetaminophen, 650 mg, Q6H PRN   Or  acetaminophen, 650 mg, Q6H PRN  ALPRAZolam, 0.5 mg, BID PRN  morphine, 7.5 mg, Q6H PRN  ipratropium 0.5 mg-albuterol 2.5 mg, 1 Dose, Q4H PRN  oxyCODONE-acetaminophen, 1 tablet, Q4H PRN  heparin (porcine), 4,000 Units, PRN  heparin (porcine), 2,000 Units, PRN  ondansetron, 4 mg, Q6H PRN  technetium sestamibi, 20 millicurie, ONCE PRN         Objective:    /69   Pulse 73   Temp 97.4 °F (36.3 °C) (Infrared)   Resp 18   Ht 1.702 m (5' 7\")   Wt 69.4 kg (153 lb)   SpO2 99%   BMI 23.96 kg/m²     Constitutional:  appears older than documented age, NAD, awake, alert  Eyes: no scleral icterus, normal lids, no discharge  ENMT:  
called?->No Reason for exam:->syncope, fall, + thinners, dizziness FINDINGS: BRAIN/VENTRICLES: There is no acute intracranial hemorrhage. There is a vague area of low attenuation within the superior right frontal lobe extending into the centrum semiovale.  There is no appreciable mass effect.  There is no expected dilatation of the right lateral ventricle. The left cerebral hemisphere is unremarkable.  The cerebellum is unremarkable. ORBITS: The visualized portion of the orbits demonstrate no acute abnormality. SINUSES: There is mucosal thickening seen within the right left maxillary sinuses and ethmoid air cells. SOFT TISSUES/SKULL:  No acute abnormality of the visualized skull or soft tissues.     1. There is no acute intracranial hemorrhage 2. Area of low attenuation within the posterior aspect the right frontal lobe extending to the right centrum semiovale.  The etiology is uncertain.  This finding can be seen in a patient with an old infarct.  There is no appreciable mass effect or significant vasogenic edema.  There are no prior studies available for review.  Dedicated MRI of the brain is recommended for further evaluation. 3. Chronic bilateral maxillary and ethmoid sinus disease.     XR CHEST PORTABLE    Result Date: 11/3/2024  EXAMINATION: ONE XRAY VIEW OF THE CHEST 11/3/2024 2:14 pm COMPARISON: 09/26/2024. HISTORY: ORDERING SYSTEM PROVIDED HISTORY: dizziness, syncope TECHNOLOGIST PROVIDED HISTORY: Reason for exam:->dizziness, syncope FINDINGS: The cardiac silhouette is within normals. There is a right lower lobe infiltrate.  Right upper lobe is clear.  Left upper lobe is clear.  There is blunting the left costophrenic angle and I cannot exclude limb filtrate and or trace effusion within the left lung base.     1. Right lower lobe pneumonia 2. Blunting of the left costophrenic angle.  A left lower lobe infiltrate and trace left pleural effusion cannot be excluded.         ASSESSMENT/PLAN :  64-year-old 
female   - CAD, CHF, COPD, HLD, HTN, and DVT/PE diagnosed in September in which she is on Eliquis that she reports compliance.     - Worsening R leg pain and R thoracic and chest pain with associated shortness of breath.   - RLE doppler again showed a nonocclusive thrombus extending from the right external iliac vein through the popliteal vein.   - CT A/P without contrast showed no acute intra-abdominal pelvic process appreciated. Tiny nonobstructive left renal calculus.  No hydronephrosis.  -  Patient has significant SRIDEVI with a Cr is 2.3 with a GFR of 23. So VQ scan is being done and pending at this time.  - Remains on Eliquis at this time.   - Troponin 16.  - LFT's and CBC unremarkable.     Consultation for patient with persistent DVT while on Eliquis.     Attending addendum:  Persistent extensive DVT in the right lower extremity extending from external iliac vein to common femoral vein and popliteal vein and the patient with prior history of PE/DVT 2 months ago  The patient states she has been very compliant with Eliquis  CTA chest could not be done due to acute kidney failure  Patient will be going for VQ scan .    Patient likely has DOAC/Eliquis failure  Her initial PE was apparently unprovoked  To check D-dimer, anticardiolipin antibody titers ,DRVVT and thrombophilia panel    Recommend holding Eliquis  Initiate IV heparin and pharmacy to monitor APTT with plans to transition to warfarin prior to discharge.    The fact that patient is on DAPT for coronary artery disease she is at risk of bleeding  Suggest holding Plavix if okay with cardiology      Thank you for the consult, will follow.    11/25/2024  Continues on IV heparin  Plavix has been held  Extensive DVT of right lower extremity extending from external iliac vein to common femoral vein and popliteal vein in a patient with prior history of PE and DVT 2 months ago.  She had been very compliant with Eliquis.  This likely represents Eliquis failure.    VQ

## 2024-11-29 LAB
PAI-1 INTERPRETATION: ABNORMAL
PLASMINOGEN ACT. INHIBITOR-1 (PAI-1) SPECIMEN: ABNORMAL

## 2024-11-30 LAB
F2 C.20210G>A GENO BLD/T: NEGATIVE
F5 P.R506Q BLD/T QL: NEGATIVE
FACTOR XIII (F13A1) V34L VARIANT: NORMAL
FACTOR XIII VARIANT SPECIMEN: NORMAL
MTHFR INTERPRETATION: NORMAL
MTHFR MUTATION A1286C: NEGATIVE
MTHFR MUTATION C665T: NORMAL
SPECIMEN SOURCE: NORMAL
SPECIMEN SOURCE: NORMAL
SPECIMEN: NORMAL

## 2025-02-03 ENCOUNTER — HOSPITAL ENCOUNTER (OUTPATIENT)
Age: 65
Setting detail: OBSERVATION
Discharge: HOME | End: 2025-02-04
Attending: STUDENT IN AN ORGANIZED HEALTH CARE EDUCATION/TRAINING PROGRAM | Admitting: FAMILY MEDICINE
Payer: COMMERCIAL

## 2025-02-03 ENCOUNTER — APPOINTMENT (OUTPATIENT)
Dept: CT IMAGING | Age: 65
End: 2025-02-03
Payer: COMMERCIAL

## 2025-02-03 ENCOUNTER — APPOINTMENT (OUTPATIENT)
Dept: ULTRASOUND IMAGING | Age: 65
End: 2025-02-03
Payer: COMMERCIAL

## 2025-02-03 DIAGNOSIS — R07.9 CHEST PAIN, UNSPECIFIED TYPE: Primary | ICD-10-CM

## 2025-02-03 DIAGNOSIS — I82.5Y1 CHRONIC DEEP VEIN THROMBOSIS (DVT) OF PROXIMAL VEIN OF RIGHT LOWER EXTREMITY (HCC): ICD-10-CM

## 2025-02-03 LAB
ALBUMIN SERPL-MCNC: 4 G/DL (ref 3.5–5.2)
ALP SERPL-CCNC: 52 U/L (ref 35–104)
ALT SERPL-CCNC: 6 U/L (ref 0–32)
ANION GAP SERPL CALCULATED.3IONS-SCNC: 14 MMOL/L (ref 7–16)
AST SERPL-CCNC: 10 U/L (ref 0–31)
BASOPHILS # BLD: 0.02 K/UL (ref 0–0.2)
BASOPHILS NFR BLD: 0 % (ref 0–2)
BILIRUB SERPL-MCNC: 0.2 MG/DL (ref 0–1.2)
BILIRUB UR QL STRIP: NEGATIVE
BNP SERPL-MCNC: 222 PG/ML (ref 0–450)
BUN SERPL-MCNC: 14 MG/DL (ref 6–23)
CALCIUM SERPL-MCNC: 9.2 MG/DL (ref 8.6–10.2)
CASTS #/AREA URNS LPF: ABNORMAL /LPF
CHLORIDE SERPL-SCNC: 104 MMOL/L (ref 98–107)
CLARITY UR: ABNORMAL
CO2 SERPL-SCNC: 23 MMOL/L (ref 22–29)
COLOR UR: YELLOW
CREAT SERPL-MCNC: 1 MG/DL (ref 0.5–1)
CRYSTALS URNS MICRO: ABNORMAL /HPF
EOSINOPHIL # BLD: 0.32 K/UL (ref 0.05–0.5)
EOSINOPHILS RELATIVE PERCENT: 4 % (ref 0–6)
EPI CELLS #/AREA URNS HPF: ABNORMAL /HPF
ERYTHROCYTE [DISTWIDTH] IN BLOOD BY AUTOMATED COUNT: 13.4 % (ref 11.5–15)
FLUAV RNA RESP QL NAA+PROBE: NOT DETECTED
FLUBV RNA RESP QL NAA+PROBE: NOT DETECTED
GFR, ESTIMATED: 62 ML/MIN/1.73M2
GLUCOSE SERPL-MCNC: 124 MG/DL (ref 74–99)
GLUCOSE UR STRIP-MCNC: NEGATIVE MG/DL
HCT VFR BLD AUTO: 40.6 % (ref 34–48)
HGB BLD-MCNC: 13.3 G/DL (ref 11.5–15.5)
HGB UR QL STRIP.AUTO: NEGATIVE
IMM GRANULOCYTES # BLD AUTO: 0.03 K/UL (ref 0–0.58)
IMM GRANULOCYTES NFR BLD: 0 % (ref 0–5)
KETONES UR STRIP-MCNC: NEGATIVE MG/DL
LACTATE BLDV-SCNC: 2.8 MMOL/L (ref 0.5–2.2)
LEUKOCYTE ESTERASE UR QL STRIP: NEGATIVE
LYMPHOCYTES NFR BLD: 2.24 K/UL (ref 1.5–4)
LYMPHOCYTES RELATIVE PERCENT: 26 % (ref 20–42)
MAGNESIUM SERPL-MCNC: 1.9 MG/DL (ref 1.6–2.6)
MCH RBC QN AUTO: 29 PG (ref 26–35)
MCHC RBC AUTO-ENTMCNC: 32.8 G/DL (ref 32–34.5)
MCV RBC AUTO: 88.5 FL (ref 80–99.9)
MONOCYTES NFR BLD: 0.65 K/UL (ref 0.1–0.95)
MONOCYTES NFR BLD: 8 % (ref 2–12)
NEUTROPHILS NFR BLD: 62 % (ref 43–80)
NEUTS SEG NFR BLD: 5.23 K/UL (ref 1.8–7.3)
NITRITE UR QL STRIP: NEGATIVE
PH UR STRIP: 5.5 [PH] (ref 5–8)
PLATELET # BLD AUTO: 358 K/UL (ref 130–450)
PMV BLD AUTO: 9 FL (ref 7–12)
POTASSIUM SERPL-SCNC: 3.6 MMOL/L (ref 3.5–5)
PROT SERPL-MCNC: 6.5 G/DL (ref 6.4–8.3)
PROT UR STRIP-MCNC: NEGATIVE MG/DL
RBC # BLD AUTO: 4.59 M/UL (ref 3.5–5.5)
RBC #/AREA URNS HPF: ABNORMAL /HPF
SARS-COV-2 RNA RESP QL NAA+PROBE: NOT DETECTED
SODIUM SERPL-SCNC: 141 MMOL/L (ref 132–146)
SOURCE: NORMAL
SP GR UR STRIP: 1.01 (ref 1–1.03)
SPECIMEN DESCRIPTION: NORMAL
TROPONIN I SERPL HS-MCNC: 12 NG/L (ref 0–9)
TROPONIN I SERPL HS-MCNC: 15 NG/L (ref 0–9)
UROBILINOGEN UR STRIP-ACNC: 0.2 EU/DL (ref 0–1)
WBC #/AREA URNS HPF: ABNORMAL /HPF
WBC OTHER # BLD: 8.5 K/UL (ref 4.5–11.5)

## 2025-02-03 PROCEDURE — 96376 TX/PRO/DX INJ SAME DRUG ADON: CPT

## 2025-02-03 PROCEDURE — 6360000002 HC RX W HCPCS: Performed by: STUDENT IN AN ORGANIZED HEALTH CARE EDUCATION/TRAINING PROGRAM

## 2025-02-03 PROCEDURE — 6360000004 HC RX CONTRAST MEDICATION: Performed by: RADIOLOGY

## 2025-02-03 PROCEDURE — 85025 COMPLETE CBC W/AUTO DIFF WBC: CPT

## 2025-02-03 PROCEDURE — 96361 HYDRATE IV INFUSION ADD-ON: CPT

## 2025-02-03 PROCEDURE — 83735 ASSAY OF MAGNESIUM: CPT

## 2025-02-03 PROCEDURE — 81001 URINALYSIS AUTO W/SCOPE: CPT

## 2025-02-03 PROCEDURE — 83880 ASSAY OF NATRIURETIC PEPTIDE: CPT

## 2025-02-03 PROCEDURE — 96375 TX/PRO/DX INJ NEW DRUG ADDON: CPT

## 2025-02-03 PROCEDURE — 84484 ASSAY OF TROPONIN QUANT: CPT

## 2025-02-03 PROCEDURE — 93971 EXTREMITY STUDY: CPT

## 2025-02-03 PROCEDURE — 80053 COMPREHEN METABOLIC PANEL: CPT

## 2025-02-03 PROCEDURE — 83605 ASSAY OF LACTIC ACID: CPT

## 2025-02-03 PROCEDURE — 96374 THER/PROPH/DIAG INJ IV PUSH: CPT

## 2025-02-03 PROCEDURE — G0378 HOSPITAL OBSERVATION PER HR: HCPCS

## 2025-02-03 PROCEDURE — 2580000003 HC RX 258: Performed by: STUDENT IN AN ORGANIZED HEALTH CARE EDUCATION/TRAINING PROGRAM

## 2025-02-03 PROCEDURE — 99223 1ST HOSP IP/OBS HIGH 75: CPT | Performed by: FAMILY MEDICINE

## 2025-02-03 PROCEDURE — 87636 SARSCOV2 & INF A&B AMP PRB: CPT

## 2025-02-03 PROCEDURE — 2500000003 HC RX 250 WO HCPCS: Performed by: FAMILY MEDICINE

## 2025-02-03 PROCEDURE — 99285 EMERGENCY DEPT VISIT HI MDM: CPT

## 2025-02-03 PROCEDURE — 6370000000 HC RX 637 (ALT 250 FOR IP): Performed by: FAMILY MEDICINE

## 2025-02-03 PROCEDURE — 71275 CT ANGIOGRAPHY CHEST: CPT

## 2025-02-03 PROCEDURE — 6370000000 HC RX 637 (ALT 250 FOR IP): Performed by: STUDENT IN AN ORGANIZED HEALTH CARE EDUCATION/TRAINING PROGRAM

## 2025-02-03 RX ORDER — FENTANYL CITRATE 50 UG/ML
50 INJECTION, SOLUTION INTRAMUSCULAR; INTRAVENOUS ONCE
Status: COMPLETED | OUTPATIENT
Start: 2025-02-03 | End: 2025-02-03

## 2025-02-03 RX ORDER — ONDANSETRON 2 MG/ML
4 INJECTION INTRAMUSCULAR; INTRAVENOUS ONCE
Status: COMPLETED | OUTPATIENT
Start: 2025-02-03 | End: 2025-02-03

## 2025-02-03 RX ORDER — 0.9 % SODIUM CHLORIDE 0.9 %
1000 INTRAVENOUS SOLUTION INTRAVENOUS ONCE
Status: COMPLETED | OUTPATIENT
Start: 2025-02-03 | End: 2025-02-03

## 2025-02-03 RX ORDER — ACETAMINOPHEN 650 MG/1
650 SUPPOSITORY RECTAL EVERY 6 HOURS PRN
Status: DISCONTINUED | OUTPATIENT
Start: 2025-02-03 | End: 2025-02-04 | Stop reason: HOSPADM

## 2025-02-03 RX ORDER — SODIUM CHLORIDE 0.9 % (FLUSH) 0.9 %
5-40 SYRINGE (ML) INJECTION PRN
Status: DISCONTINUED | OUTPATIENT
Start: 2025-02-03 | End: 2025-02-04 | Stop reason: HOSPADM

## 2025-02-03 RX ORDER — LISINOPRIL 20 MG/1
40 TABLET ORAL DAILY
Status: DISCONTINUED | OUTPATIENT
Start: 2025-02-04 | End: 2025-02-04 | Stop reason: HOSPADM

## 2025-02-03 RX ORDER — METOPROLOL SUCCINATE 25 MG/1
25 TABLET, EXTENDED RELEASE ORAL DAILY
Status: DISCONTINUED | OUTPATIENT
Start: 2025-02-04 | End: 2025-02-04 | Stop reason: HOSPADM

## 2025-02-03 RX ORDER — MORPHINE SULFATE 15 MG/1
15 TABLET, FILM COATED, EXTENDED RELEASE ORAL EVERY 12 HOURS
Status: DISCONTINUED | OUTPATIENT
Start: 2025-02-03 | End: 2025-02-03

## 2025-02-03 RX ORDER — ATORVASTATIN CALCIUM 40 MG/1
40 TABLET, FILM COATED ORAL NIGHTLY
Status: DISCONTINUED | OUTPATIENT
Start: 2025-02-03 | End: 2025-02-03

## 2025-02-03 RX ORDER — ASPIRIN 81 MG/1
324 TABLET, CHEWABLE ORAL ONCE
Status: COMPLETED | OUTPATIENT
Start: 2025-02-03 | End: 2025-02-03

## 2025-02-03 RX ORDER — MORPHINE SULFATE 15 MG/1
30 TABLET, FILM COATED, EXTENDED RELEASE ORAL EVERY 12 HOURS
Status: DISCONTINUED | OUTPATIENT
Start: 2025-02-03 | End: 2025-02-04 | Stop reason: HOSPADM

## 2025-02-03 RX ORDER — FENTANYL CITRATE 50 UG/ML
25 INJECTION, SOLUTION INTRAMUSCULAR; INTRAVENOUS ONCE
Status: COMPLETED | OUTPATIENT
Start: 2025-02-03 | End: 2025-02-03

## 2025-02-03 RX ORDER — QUETIAPINE FUMARATE 100 MG/1
300 TABLET, FILM COATED ORAL NIGHTLY
Status: DISCONTINUED | OUTPATIENT
Start: 2025-02-03 | End: 2025-02-04 | Stop reason: HOSPADM

## 2025-02-03 RX ORDER — FOLIC ACID 1 MG/1
1 TABLET ORAL DAILY
Status: DISCONTINUED | OUTPATIENT
Start: 2025-02-04 | End: 2025-02-04 | Stop reason: HOSPADM

## 2025-02-03 RX ORDER — SODIUM CHLORIDE 0.9 % (FLUSH) 0.9 %
5-40 SYRINGE (ML) INJECTION EVERY 12 HOURS SCHEDULED
Status: DISCONTINUED | OUTPATIENT
Start: 2025-02-03 | End: 2025-02-04 | Stop reason: HOSPADM

## 2025-02-03 RX ORDER — TRAMADOL HYDROCHLORIDE 50 MG/1
50 TABLET ORAL EVERY 6 HOURS PRN
Status: DISCONTINUED | OUTPATIENT
Start: 2025-02-03 | End: 2025-02-04

## 2025-02-03 RX ORDER — ASPIRIN 81 MG/1
81 TABLET ORAL DAILY
Status: DISCONTINUED | OUTPATIENT
Start: 2025-02-04 | End: 2025-02-04 | Stop reason: HOSPADM

## 2025-02-03 RX ORDER — ENOXAPARIN SODIUM 100 MG/ML
40 INJECTION SUBCUTANEOUS DAILY
Status: DISCONTINUED | OUTPATIENT
Start: 2025-02-04 | End: 2025-02-04 | Stop reason: HOSPADM

## 2025-02-03 RX ORDER — ALPRAZOLAM 0.5 MG
0.5 TABLET ORAL 2 TIMES DAILY PRN
Status: DISCONTINUED | OUTPATIENT
Start: 2025-02-03 | End: 2025-02-04 | Stop reason: HOSPADM

## 2025-02-03 RX ORDER — ATORVASTATIN CALCIUM 40 MG/1
40 TABLET, FILM COATED ORAL NIGHTLY
Status: DISCONTINUED | OUTPATIENT
Start: 2025-02-03 | End: 2025-02-04 | Stop reason: HOSPADM

## 2025-02-03 RX ORDER — ACETAMINOPHEN 325 MG/1
650 TABLET ORAL EVERY 6 HOURS PRN
Status: DISCONTINUED | OUTPATIENT
Start: 2025-02-03 | End: 2025-02-04 | Stop reason: HOSPADM

## 2025-02-03 RX ORDER — IOPAMIDOL 755 MG/ML
75 INJECTION, SOLUTION INTRAVASCULAR
Status: COMPLETED | OUTPATIENT
Start: 2025-02-03 | End: 2025-02-03

## 2025-02-03 RX ADMIN — Medication 10 ML: at 23:53

## 2025-02-03 RX ADMIN — ONDANSETRON 4 MG: 2 INJECTION, SOLUTION INTRAMUSCULAR; INTRAVENOUS at 20:31

## 2025-02-03 RX ADMIN — QUETIAPINE 300 MG: 100 TABLET ORAL at 23:40

## 2025-02-03 RX ADMIN — SODIUM CHLORIDE 1000 ML: 9 INJECTION, SOLUTION INTRAVENOUS at 18:28

## 2025-02-03 RX ADMIN — SODIUM CHLORIDE 1000 ML: 9 INJECTION, SOLUTION INTRAVENOUS at 16:28

## 2025-02-03 RX ADMIN — ATORVASTATIN CALCIUM 40 MG: 40 TABLET, FILM COATED ORAL at 23:40

## 2025-02-03 RX ADMIN — FENTANYL CITRATE 50 MCG: 50 INJECTION, SOLUTION INTRAMUSCULAR; INTRAVENOUS at 20:28

## 2025-02-03 RX ADMIN — FENTANYL CITRATE 25 MCG: 50 INJECTION, SOLUTION INTRAMUSCULAR; INTRAVENOUS at 18:23

## 2025-02-03 RX ADMIN — MORPHINE SULFATE 30 MG: 15 TABLET, FILM COATED, EXTENDED RELEASE ORAL at 23:40

## 2025-02-03 RX ADMIN — ASPIRIN 324 MG: 81 TABLET, CHEWABLE ORAL at 22:26

## 2025-02-03 RX ADMIN — IOPAMIDOL 70 ML: 755 INJECTION, SOLUTION INTRAVENOUS at 17:59

## 2025-02-03 RX ADMIN — ONDANSETRON 4 MG: 2 INJECTION INTRAMUSCULAR; INTRAVENOUS at 16:40

## 2025-02-03 ASSESSMENT — PAIN DESCRIPTION - PAIN TYPE
TYPE: ACUTE PAIN
TYPE: ACUTE PAIN

## 2025-02-03 ASSESSMENT — PAIN - FUNCTIONAL ASSESSMENT
PAIN_FUNCTIONAL_ASSESSMENT: 0-10
PAIN_FUNCTIONAL_ASSESSMENT: PREVENTS OR INTERFERES WITH MANY ACTIVE NOT PASSIVE ACTIVITIES
PAIN_FUNCTIONAL_ASSESSMENT: 0-10

## 2025-02-03 ASSESSMENT — PAIN DESCRIPTION - ORIENTATION
ORIENTATION: ANTERIOR;MID
ORIENTATION: ANTERIOR;MID
ORIENTATION: RIGHT

## 2025-02-03 ASSESSMENT — PAIN DESCRIPTION - LOCATION
LOCATION: CHEST

## 2025-02-03 ASSESSMENT — PAIN SCALES - GENERAL
PAINLEVEL_OUTOF10: 8
PAINLEVEL_OUTOF10: 6

## 2025-02-03 ASSESSMENT — PAIN DESCRIPTION - DESCRIPTORS
DESCRIPTORS: STABBING
DESCRIPTORS: ACHING;STABBING
DESCRIPTORS: ACHING
DESCRIPTORS: ACHING;CRAMPING;DISCOMFORT

## 2025-02-03 ASSESSMENT — PAIN DESCRIPTION - ONSET: ONSET: SUDDEN

## 2025-02-03 ASSESSMENT — PAIN DESCRIPTION - FREQUENCY: FREQUENCY: CONTINUOUS

## 2025-02-03 NOTE — ED NOTES
Radiology Procedure Waiver   Name: Niru Chaves  : 1960  MRN: [id number]    Date:  2/3/25    Time: 4:10 PM EST    Benefits of immediately proceeding with Radiology exam(s) without pre-testing outweigh the risks or are not indicated as specified below and therefore the following is/are being waived:    [] Pregnancy test   [] Patients LMP on-time and regular.   [] Patient had Tubal Ligation or has other Contraception Device.   [] Patient  is Menopausal or Premenarcheal.    [] Patient had Full or Partial Hysterectomy.    [] Protocol for Iodine allergy    [] MRI Questionnaire     [x] BUN/Creatinine   [] Patient age w/no hx of renal dysfunction.   [] Patient on Dialysis.   [] Recent Normal Labs.  Electronically signed by Ryan Lunsford MD on 2/3/25 at 4:10 PM EST               Ryan Lunsford MD  [phone]

## 2025-02-03 NOTE — ED PROVIDER NOTES
[facility] EMERGENCY DEPARTMENT  EMERGENCY DEPARTMENT ENCOUNTER    Pt Name: Niru Chaves  MRN: [id number]  Birthdate 1960  Date of evaluation: 2/3/2025  Provider: Ryan Lunsford MD  PCP: Crystal Paulino MD  Note Started: 4:09 PM EST 2/3/25    HPI     Patient is a 64 y.o. female presents with a chief complaint of   Chief Complaint   Patient presents with    Shortness of Breath    Dizziness    Leg Pain     Pt comes to the ED with c/o SOB and right leg pain- hx of clots and clotting disorder and takes coumadin.    .    Patient presented for shortness of breath leg pain and dizziness.  Patient does have a history of pulmonary embolisms.  Stated that she developed leg pain 2 days ago mostly in the right calf.  Patient takes warfarin at home.  States that she has been taking it as prescribed.  Patient denies any fevers or chills.  No nausea or vomiting.  Patient states symptoms started couple days ago.    Nursing Notes were all reviewed and agreed with or any disagreements were addressed in the HPI.    History From: Patient    Review of Systems   Pertinent positives and negatives as per HPI.     Physical Exam  Vitals and nursing note reviewed.   Constitutional:       Appearance: She is well-developed.   HENT:      Head: Normocephalic and atraumatic.   Eyes:      Conjunctiva/sclera: Conjunctivae normal.   Cardiovascular:      Rate and Rhythm: Normal rate and regular rhythm.      Heart sounds: Normal heart sounds. No murmur heard.  Pulmonary:      Effort: Pulmonary effort is normal. No respiratory distress.      Breath sounds: Normal breath sounds. No wheezing or rales.   Abdominal:      General: Bowel sounds are normal.      Palpations: Abdomen is soft.      Tenderness: There is no abdominal tenderness. There is no guarding or rebound.   Musculoskeletal:      Cervical back: Normal range of motion and neck supple.   Skin:     General: Skin is warm and dry.   Neurological:      Mental Status:

## 2025-02-04 ENCOUNTER — APPOINTMENT (OUTPATIENT)
Dept: NUCLEAR MEDICINE | Age: 65
End: 2025-02-04
Payer: COMMERCIAL

## 2025-02-04 ENCOUNTER — APPOINTMENT (OUTPATIENT)
Age: 65
End: 2025-02-04
Payer: COMMERCIAL

## 2025-02-04 VITALS
TEMPERATURE: 97.8 F | SYSTOLIC BLOOD PRESSURE: 151 MMHG | OXYGEN SATURATION: 98 % | BODY MASS INDEX: 24.8 KG/M2 | RESPIRATION RATE: 17 BRPM | DIASTOLIC BLOOD PRESSURE: 68 MMHG | HEART RATE: 78 BPM | HEIGHT: 67 IN | WEIGHT: 158 LBS

## 2025-02-04 PROBLEM — N17.9 AKI (ACUTE KIDNEY INJURY) (HCC): Status: RESOLVED | Noted: 2024-11-24 | Resolved: 2025-02-04

## 2025-02-04 PROBLEM — R55 SYNCOPE AND COLLAPSE: Status: RESOLVED | Noted: 2024-11-03 | Resolved: 2025-02-04

## 2025-02-04 PROBLEM — R57.9 SHOCK CIRCULATORY: Status: RESOLVED | Noted: 2024-09-22 | Resolved: 2025-02-04

## 2025-02-04 PROBLEM — K31.84 GASTROPARESIS: Status: RESOLVED | Noted: 2024-09-22 | Resolved: 2025-02-04

## 2025-02-04 PROBLEM — I26.99 PULMONARY EMBOLISM WITHOUT ACUTE COR PULMONALE (HCC): Status: RESOLVED | Noted: 2024-09-23 | Resolved: 2025-02-04

## 2025-02-04 PROBLEM — Z86.73 HISTORY OF CVA (CEREBROVASCULAR ACCIDENT): Status: RESOLVED | Noted: 2024-09-22 | Resolved: 2025-02-04

## 2025-02-04 PROBLEM — I25.10 ATHEROSCLEROSIS OF NATIVE CORONARY ARTERY OF NATIVE HEART WITHOUT ANGINA PECTORIS: Status: RESOLVED | Noted: 2023-03-20 | Resolved: 2025-02-04

## 2025-02-04 PROBLEM — B88.8 INFESTATION BY BED BUG: Status: RESOLVED | Noted: 2024-11-24 | Resolved: 2025-02-04

## 2025-02-04 PROBLEM — I25.2 HISTORY OF MI (MYOCARDIAL INFARCTION): Status: RESOLVED | Noted: 2024-09-22 | Resolved: 2025-02-04

## 2025-02-04 PROBLEM — M19.90 DJD (DEGENERATIVE JOINT DISEASE): Status: RESOLVED | Noted: 2024-09-22 | Resolved: 2025-02-04

## 2025-02-04 LAB
ANION GAP SERPL CALCULATED.3IONS-SCNC: 9 MMOL/L (ref 7–16)
BUN SERPL-MCNC: 9 MG/DL (ref 6–23)
CALCIUM SERPL-MCNC: 8.4 MG/DL (ref 8.6–10.2)
CHLORIDE SERPL-SCNC: 110 MMOL/L (ref 98–107)
CHOLEST SERPL-MCNC: 128 MG/DL
CO2 SERPL-SCNC: 22 MMOL/L (ref 22–29)
CREAT SERPL-MCNC: 0.7 MG/DL (ref 0.5–1)
ECHO BSA: 1.84 M2
GFR, ESTIMATED: >90 ML/MIN/1.73M2
GLUCOSE SERPL-MCNC: 80 MG/DL (ref 74–99)
HDLC SERPL-MCNC: 36 MG/DL
INR PPP: 1.2
LDLC SERPL CALC-MCNC: 70 MG/DL
NUC STRESS EJECTION FRACTION: 69 %
POTASSIUM SERPL-SCNC: 3.6 MMOL/L (ref 3.5–5)
PROTHROMBIN TIME: 12.7 SEC (ref 9.3–12.4)
SODIUM SERPL-SCNC: 141 MMOL/L (ref 132–146)
STRESS BASELINE DIAS BP: 65 MMHG
STRESS BASELINE HR: 65 BPM
STRESS BASELINE SYS BP: 132 MMHG
STRESS O2 SAT REST: 95 %
STRESS STAGE 1 BP: NORMAL MMHG
STRESS STAGE 1 COMMENTS: NORMAL
STRESS STAGE 1 DURATION: 1 MIN:SEC
STRESS STAGE 1 HR: 76 BPM
STRESS STAGE RECOVERY 1 BP: NORMAL MMHG
STRESS STAGE RECOVERY 1 DURATION: 1 MIN:SEC
STRESS STAGE RECOVERY 1 HR: 100 BPM
STRESS STAGE RECOVERY 2 BP: NORMAL MMHG
STRESS STAGE RECOVERY 2 DURATION: 2 MIN:SEC
STRESS STAGE RECOVERY 2 HR: 90 BPM
STRESS STAGE RECOVERY 3 BP: NORMAL MMHG
STRESS STAGE RECOVERY 3 DURATION: 3 MIN:SEC
STRESS STAGE RECOVERY 3 HR: 85 BPM
STRESS STAGE RECOVERY 4 BP: NORMAL MMHG
STRESS STAGE RECOVERY 4 COMMENTS: NORMAL
STRESS STAGE RECOVERY 4 DURATION: 4 MIN:SEC
STRESS STAGE RECOVERY 4 HR: 85 BPM
STRESS TARGET HR: 156 BPM
TRIGL SERPL-MCNC: 111 MG/DL
VLDLC SERPL CALC-MCNC: 22 MG/DL

## 2025-02-04 PROCEDURE — G0378 HOSPITAL OBSERVATION PER HR: HCPCS

## 2025-02-04 PROCEDURE — 36415 COLL VENOUS BLD VENIPUNCTURE: CPT

## 2025-02-04 PROCEDURE — 80048 BASIC METABOLIC PNL TOTAL CA: CPT

## 2025-02-04 PROCEDURE — 3430000000 HC RX DIAGNOSTIC RADIOPHARMACEUTICAL: Performed by: RADIOLOGY

## 2025-02-04 PROCEDURE — 2500000003 HC RX 250 WO HCPCS: Performed by: FAMILY MEDICINE

## 2025-02-04 PROCEDURE — 6360000002 HC RX W HCPCS: Performed by: PHYSICIAN ASSISTANT

## 2025-02-04 PROCEDURE — A9500 TC99M SESTAMIBI: HCPCS | Performed by: RADIOLOGY

## 2025-02-04 PROCEDURE — 99223 1ST HOSP IP/OBS HIGH 75: CPT | Performed by: INTERNAL MEDICINE

## 2025-02-04 PROCEDURE — 96372 THER/PROPH/DIAG INJ SC/IM: CPT

## 2025-02-04 PROCEDURE — 6360000002 HC RX W HCPCS: Performed by: FAMILY MEDICINE

## 2025-02-04 PROCEDURE — 80061 LIPID PANEL: CPT

## 2025-02-04 PROCEDURE — 93017 CV STRESS TEST TRACING ONLY: CPT

## 2025-02-04 PROCEDURE — 85610 PROTHROMBIN TIME: CPT

## 2025-02-04 PROCEDURE — 99239 HOSP IP/OBS DSCHRG MGMT >30: CPT | Performed by: INTERNAL MEDICINE

## 2025-02-04 PROCEDURE — APPSS60 APP SPLIT SHARED TIME 46-60 MINUTES: Performed by: PHYSICIAN ASSISTANT

## 2025-02-04 PROCEDURE — 6370000000 HC RX 637 (ALT 250 FOR IP): Performed by: INTERNAL MEDICINE

## 2025-02-04 PROCEDURE — 2500000003 HC RX 250 WO HCPCS: Performed by: INTERNAL MEDICINE

## 2025-02-04 PROCEDURE — 6370000000 HC RX 637 (ALT 250 FOR IP): Performed by: FAMILY MEDICINE

## 2025-02-04 PROCEDURE — 96375 TX/PRO/DX INJ NEW DRUG ADDON: CPT

## 2025-02-04 PROCEDURE — 6360000002 HC RX W HCPCS: Performed by: INTERNAL MEDICINE

## 2025-02-04 PROCEDURE — 78452 HT MUSCLE IMAGE SPECT MULT: CPT

## 2025-02-04 RX ORDER — TETRAKIS(2-METHOXYISOBUTYLISOCYANIDE)COPPER(I) TETRAFLUOROBORATE 1 MG/ML
10 INJECTION, POWDER, LYOPHILIZED, FOR SOLUTION INTRAVENOUS
Status: COMPLETED | OUTPATIENT
Start: 2025-02-04 | End: 2025-02-04

## 2025-02-04 RX ORDER — TETRAKIS(2-METHOXYISOBUTYLISOCYANIDE)COPPER(I) TETRAFLUOROBORATE 1 MG/ML
30 INJECTION, POWDER, LYOPHILIZED, FOR SOLUTION INTRAVENOUS
Status: COMPLETED | OUTPATIENT
Start: 2025-02-04 | End: 2025-02-04

## 2025-02-04 RX ORDER — OXYCODONE HYDROCHLORIDE 5 MG/1
10 TABLET ORAL ONCE
Status: COMPLETED | OUTPATIENT
Start: 2025-02-04 | End: 2025-02-04

## 2025-02-04 RX ORDER — REGADENOSON 0.08 MG/ML
0.4 INJECTION, SOLUTION INTRAVENOUS
Status: COMPLETED | OUTPATIENT
Start: 2025-02-04 | End: 2025-02-04

## 2025-02-04 RX ORDER — IPRATROPIUM BROMIDE AND ALBUTEROL SULFATE 2.5; .5 MG/3ML; MG/3ML
1 SOLUTION RESPIRATORY (INHALATION) EVERY 4 HOURS PRN
Status: DISCONTINUED | OUTPATIENT
Start: 2025-02-04 | End: 2025-02-04 | Stop reason: HOSPADM

## 2025-02-04 RX ORDER — METHYLPREDNISOLONE 4 MG/1
TABLET ORAL
Qty: 1 KIT | Refills: 0 | Status: SHIPPED | OUTPATIENT
Start: 2025-02-04 | End: 2025-02-10

## 2025-02-04 RX ORDER — PANTOPRAZOLE SODIUM 40 MG/1
40 TABLET, DELAYED RELEASE ORAL
Qty: 30 TABLET | Refills: 0 | Status: SHIPPED | OUTPATIENT
Start: 2025-02-04

## 2025-02-04 RX ORDER — OXYCODONE HYDROCHLORIDE 5 MG/1
10 TABLET ORAL ONCE
Status: DISCONTINUED | OUTPATIENT
Start: 2025-02-04 | End: 2025-02-04 | Stop reason: CLARIF

## 2025-02-04 RX ORDER — WARFARIN SODIUM 7.5 MG/1
7.5 TABLET ORAL
Status: DISCONTINUED | OUTPATIENT
Start: 2025-02-04 | End: 2025-02-04 | Stop reason: HOSPADM

## 2025-02-04 RX ADMIN — Medication 30 MILLICURIE: at 10:55

## 2025-02-04 RX ADMIN — LISINOPRIL 40 MG: 20 TABLET ORAL at 12:28

## 2025-02-04 RX ADMIN — WATER 60 MG: 1 INJECTION INTRAMUSCULAR; INTRAVENOUS; SUBCUTANEOUS at 16:35

## 2025-02-04 RX ADMIN — ASPIRIN 81 MG: 81 TABLET, COATED ORAL at 12:28

## 2025-02-04 RX ADMIN — METOPROLOL SUCCINATE 25 MG: 25 TABLET, EXTENDED RELEASE ORAL at 12:26

## 2025-02-04 RX ADMIN — ENOXAPARIN SODIUM 40 MG: 100 INJECTION SUBCUTANEOUS at 12:25

## 2025-02-04 RX ADMIN — REGADENOSON 0.4 MG: 0.08 INJECTION, SOLUTION INTRAVENOUS at 10:50

## 2025-02-04 RX ADMIN — MORPHINE SULFATE 30 MG: 15 TABLET, FILM COATED, EXTENDED RELEASE ORAL at 12:29

## 2025-02-04 RX ADMIN — Medication 10 ML: at 12:26

## 2025-02-04 RX ADMIN — OXYCODONE HYDROCHLORIDE 10 MG: 5 TABLET ORAL at 16:12

## 2025-02-04 RX ADMIN — FOLIC ACID 1 MG: 1 TABLET ORAL at 12:28

## 2025-02-04 RX ADMIN — ALPRAZOLAM 0.5 MG: 0.5 TABLET ORAL at 12:25

## 2025-02-04 RX ADMIN — Medication 10 MILLICURIE: at 10:54

## 2025-02-04 ASSESSMENT — PAIN DESCRIPTION - LOCATION
LOCATION: LEG
LOCATION: CHEST

## 2025-02-04 ASSESSMENT — PAIN - FUNCTIONAL ASSESSMENT: PAIN_FUNCTIONAL_ASSESSMENT: ACTIVITIES ARE NOT PREVENTED

## 2025-02-04 ASSESSMENT — PAIN DESCRIPTION - ORIENTATION: ORIENTATION: RIGHT

## 2025-02-04 ASSESSMENT — PAIN DESCRIPTION - DESCRIPTORS: DESCRIPTORS: SHARP

## 2025-02-04 ASSESSMENT — PAIN SCALES - GENERAL
PAINLEVEL_OUTOF10: 7
PAINLEVEL_OUTOF10: 7

## 2025-02-04 NOTE — PROGRESS NOTES
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     Weight:   Wt Readings from Last 1 Encounters:   02/04/25 71.7 kg (158 lb)       TSH:  No results found for: \"TSH\"    Hepatic Function Panel:                            Lab Results   Component Value Date/Time    ALKPHOS 52 02/03/2025 04:21 PM    ALT 6 02/03/2025 04:21 PM    AST 10 02/03/2025 04:21 PM    BILITOT 0.2 02/03/2025 04:21 PM       Current significant warfarin drug-drug interactions include: No significant interactions    Recent Labs     02/03/25  1621   HGB 13.3        Date Warfarin Dose INR Heparin or LMWH Comment   2/4 <7.5 mg> 1.2 Enoxaparin 40 mg daily                                  Assessment:  Patient is a 64 y.o. female on warfarin for  chronic PE/DVT , previously failed Eliquis. Per notes patient is noncompliant with PT/INR checks.  Patient's home warfarin dosing regimen is 7.5 mg daily.   Goal INR 2 - 3  INR 1.2 today    Plan:  Warfarin 7.5 mg tonight  Daily PT/INR until the INR is stable within the therapeutic range  Pharmacist will follow and monitor/adjust dosing as necessary    Thank you for this consult,    Olena Aranda, PharmD, BCPS 2/4/2025 10:45 AM   [phone]    [facility]: [phone]

## 2025-02-04 NOTE — DISCHARGE INSTRUCTIONS
Your information:  Name: Niru Chaves  : 1960    Your instructions:    You are being discharged home. Please follow up with your PCP and take your medications as prescribed.     IF YOU EXPERIENCE ANY OF THE FOLLOWING SYMPTOMS, CHEST PAIN, SHORTNESS OF BREATH, COUGHING UP BLOOD OR BLOODY SPUTUM, STOMACH PAIN OR CRAMPING, DARK, TARRY STOOLS, LOSS OF APPETITE, GENERAL NOT FEELING WELL, SIGNS AND SYMPTOMS OF INFECTION LIKE FEVER AND OR CHILLS, PLEASE CALL DR  OR RETURN TO THE EMERGENCY ROOM.     What to do after you leave the hospital:    Recommended diet: regular diet and diabetic diet    Recommended activity: activity as tolerated        The following personal items were collected during your admission and were returned to you:    Belongings  Dental Appliances: None  Vision - Corrective Lenses: None  Hearing Aid: None  Clothing: Shirt, Pants, Footwear, Socks  Jewelry: None  Body Piercings Removed: No  Electronic Devices: Cell Phone, Tablet,   Weapons (Notify Protective Services/Security): None  Other Valuables: Other (Comment)  Home Medications: None  Valuables Given To: Other (Comment)  Provide Name(s) of Who Valuable(s) Were Given To: patient    Information obtained by:  By signing below, I understand that if any problems occur once I leave the hospital I am to contact PCP.  I understand and acknowledge receipt of the instructions indicated above.

## 2025-02-04 NOTE — H&P
[facility] Hospitalist Group   History and Physical      CHIEF COMPLAINT:  chest pain    History of Present Illness:  64 y.o. female with a history of CAD, CHF, COPD, VTE, HTN, HLD, chronic pain on chronic opiates presents with chest pain.  Patient reports this was similar to when she had a PE in the past, she had associated SOB and right leg pain.  She was on Eliquis for VTE last September, had recurrence of right leg DVT and was transitioned to warfarin.  On arrival to ED BP was low at 79/50, responded to IVF and increased to 142/74.  Workup in ED significant for glucose 124, lactic 2.8, troponin 15 and 12.  Flu and COVID negative.  CTA chest no PE.  EKG sinus rhythm.  Patient was given ASA, fentanyl x 2, zofran x 2, and 2L IVF in ED.  Patient continues to complain of chest pain and asks for more pain medication.    Informant(s) for H&P: patient, chart    REVIEW OF SYSTEMS:  no fevers, chills, n/v, ha, vision/hearing changes, wt changes, hot/cold flashes, other open skin lesions, diarrhea, constipation, dysuria/hematuria unless noted in HPI. Complete ROS performed with the patient and is otherwise negative.      PMH:  Past Medical History:   Diagnosis Date    CAD (coronary artery disease)     CHF (congestive heart failure) ([facility])     Chronic pain     COPD (chronic obstructive pulmonary disease) ([facility])     DJD (degenerative joint disease)     DVT (deep venous thrombosis) ([facility])     History of degenerative disc disease     HLD (hyperlipidemia)     HTN (hypertension)     PE (pulmonary thromboembolism) ([facility])        Surgical History:  History reviewed. No pertinent surgical history.    Medications Prior to Admission:    Prior to Admission medications    Medication Sig Start Date End Date Taking? Authorizing Provider   warfarin (COUMADIN) 7.5 MG tablet Take 1 tablet by mouth at bedtime 11/27/24  Yes Tamara Medina,    albuterol sulfate HFA (PROVENTIL;VENTOLIN;PROAIR) 108 (90 Base) MCG/ACT

## 2025-02-04 NOTE — PROGRESS NOTES
CLINICAL PHARMACY NOTE: MEDS TO BEDS    Total # of Prescriptions Filled: 2   The following medications were delivered to the patient:  Pantoprazole 40 mg  Medrol dose pack 4 mg    Additional Documentation:

## 2025-02-04 NOTE — ACP (ADVANCE CARE PLANNING)
Advance Care Planning   Healthcare Decision Maker:    Primary Decision Maker: Abdiaziz Arcos - Brother/Sister - [phone]

## 2025-02-04 NOTE — DISCHARGE SUMMARY
[facility] Hospitalist       Hospitalist Physician Discharge Summary       Crystal Paulino MD  [address]  [address]  [place] [address]  [phone]    Schedule an appointment as soon as possible for a visit in 1 week(s)  Hospital follow up      No future appointments.    Activity level: As tolerated     Diet: ADULT DIET; Regular; 4 carb choices (60 gm/meal); No Caffeine    Labs:None     Condition at discharge: Stable     Dispo:Home      Patient ID:  Niru Chaves  [id number]  64 y.o.  1960    Admit date: 2/3/2025    Discharge date and time:  2/4/2025  3:50 PM    Admission Diagnoses: Principal Problem:    Chest pain  Active Problems:    Chronic obstructive pulmonary disease (HCC)    Hypertension    CHF (congestive heart failure) (HCC)    Hyperlipidemia    Chronic pain    Deep venous thrombosis (HCC)    Shortness of breath    Smoker  Resolved Problems:    * No resolved hospital problems. *      Discharge Diagnoses: Principal Problem:    Chest pain  Active Problems:    Chronic obstructive pulmonary disease (HCC)    Hypertension    CHF (congestive heart failure) (HCC)    Hyperlipidemia    Chronic pain    Deep venous thrombosis (HCC)    Shortness of breath    Smoker  Resolved Problems:    * No resolved hospital problems. *      Consults:  IP CONSULT TO CARDIOLOGY  IP CONSULT TO PHARMACY    Procedures: Lexiscan stress test 2/4    History of Present Illness:  64 y.o. female with a history of CAD, CHF, COPD, VTE, HTN, HLD, chronic pain on chronic opiates presents with chest pain.  Patient reports this was similar to when she had a PE in the past, she had associated SOB and right leg pain.  She was on Eliquis for VTE last September, had recurrence of right leg DVT and was transitioned to warfarin.  On arrival to ED BP was low at 79/50, responded to IVF and increased to 142/74.  Workup in ED significant for glucose 124, lactic 2.8, troponin 15 and 12.  Flu and COVID negative.  CTA chest no PE.

## 2025-02-04 NOTE — CONSULTS
disorder     Chronic pain syndrome - Chronic opioid therapy. CBD use           Further recommendations based on her symptoms and stress test.    Recommended to f/u with her cardiologist in [facility].      Will d/w Primary.     Electronically signed by Yulissa Lorenzo MD on 2/4/2025  [facility]

## 2025-02-04 NOTE — PLAN OF CARE
Problem: Chronic Conditions and Co-morbidities  Goal: Patient's chronic conditions and co-morbidity symptoms are monitored and maintained or improved  2/4/2025 1248 by Corinne Yeh, RN  Outcome: Progressing  2/4/2025 0117 by JUSTIN Roa, ZINA  Outcome: Progressing     Problem: Skin/Tissue Integrity  Goal: Skin integrity remains intact  Description: 1.  Monitor for areas of redness and/or skin breakdown  2.  Assess vascular access sites hourly  3.  Every 4-6 hours minimum:  Change oxygen saturation probe site  4.  Every 4-6 hours:  If on nasal continuous positive airway pressure, respiratory therapy assess nares and determine need for appliance change or resting period  Outcome: Progressing

## 2025-02-04 NOTE — PROGRESS NOTES
4 Eyes Skin Assessment     NAME:  Niru Chaves  YOB: 1960  MEDICAL RECORD NUMBER:  [id number]    The patient is being assessed for  Admission    I agree that at least one RN has performed a thorough Head to Toe Skin Assessment on the patient. ALL assessment sites listed below have been assessed.      Areas assessed by both nurses:    Head, Face, Ears, Shoulders, Back, Chest, Arms, Elbows, Hands, Sacrum. Buttock, Coccyx, Ischium, Legs. Feet and Heels, and Under Medical Devices         Does the Patient have a Wound? No noted wound(s)       Adi Prevention initiated by RN: No  Wound Care Orders initiated by RN: No    Pressure Injury (Stage 3,4, Unstageable, DTI, NWPT, and Complex wounds) if present, place Wound referral order by RN under : No    New Ostomies, if present place, Ostomy referral order under : No     Nurse 1 eSignature: Electronically signed by JUSTIN Roa RN on 2/4/25 at 1:03 AM EST    **SHARE this note so that the co-signing nurse can place an eSignature**    Nurse 2 eSignature: Electronically signed by Laila Yeh RN on 2/4/25 at 1:04 AM EST

## 2025-02-08 LAB
EKG ATRIAL RATE: 60 BPM
EKG P AXIS: 60 DEGREES
EKG P-R INTERVAL: 150 MS
EKG Q-T INTERVAL: 434 MS
EKG QRS DURATION: 84 MS
EKG QTC CALCULATION (BAZETT): 434 MS
EKG R AXIS: 4 DEGREES
EKG T AXIS: 35 DEGREES
EKG VENTRICULAR RATE: 60 BPM

## 2025-02-10 NOTE — CARE COORDINATION
February 10, 2025     Patient: Latonya Celaya   YOB: 1970   Date of Visit: 2/10/2025       To Whom it May Concern:    Latonya Celaya was seen in my clinic on 2/10/2025 at 3:30 pm.    Please excuse Latonya for her absence from work on the date listed above to be able to make her appointment. She remains unable to work in any capacity. She suffers from a neurocognitive disorder that impairs her ability to concentrate and perform simple vocational  tasks. She is scheduled to undergo a lumbar puncture on 02/13/25 to further work up her condition. 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 She has continued ataxic gait and is restricted to mostly sitting and ambulation with walker and assist only. These conditions together are the reasons I can state with a strong degree of medical certainty that Latonya is disabled from work in any capacity.     Sincerely,         Bryce Rocha, DO    Medical information is confidential and cannot be disclosed without the written consent of the patient or her representative.    
      Potential assistance Purchasing Medications:    Meds-to-Beds request:        [facility] - [place], [place] - [address] - P [phone] - F [phone]  [address] [place] [address]  Phone: [phone] Fax: [phone]      Notes:    Factors facilitating achievement of predicted outcomes: Pleasant    Barriers to discharge: no transportation    Additional Case Management Notes: 2/4/2025 1314 CM note: Met with patient for transition of care needs. Pt is independent and is currently residing with ex  and son at [address] [place] and plans to return at d/c.  Pt for echo and stress test today. She relays she will need transportation home as family does not have a vehicle. [email or web address] transportation can be called at [phone]. Kemal IZAGUIRRE     The Plan for Transition of Care is related to the following treatment goals of Chest pain [R07.9]  Chest pain, unspecified type [R07.9]        The Patient and/or Patient Representative Agree with the Discharge Plan?  yes    Keyonna Trujillo RN  Case Management Department

## 2025-04-23 ENCOUNTER — APPOINTMENT (OUTPATIENT)
Dept: CT IMAGING | Age: 65
End: 2025-04-23
Payer: COMMERCIAL

## 2025-04-23 ENCOUNTER — HOSPITAL ENCOUNTER (EMERGENCY)
Age: 65
Discharge: HOME | End: 2025-04-23
Payer: COMMERCIAL

## 2025-04-23 VITALS
RESPIRATION RATE: 16 BRPM | OXYGEN SATURATION: 96 % | HEART RATE: 90 BPM | DIASTOLIC BLOOD PRESSURE: 54 MMHG | TEMPERATURE: 97 F | SYSTOLIC BLOOD PRESSURE: 104 MMHG

## 2025-04-23 DIAGNOSIS — M54.50 ACUTE BILATERAL LOW BACK PAIN WITHOUT SCIATICA: Primary | ICD-10-CM

## 2025-04-23 LAB
ALBUMIN SERPL-MCNC: 4.4 G/DL (ref 3.5–5.2)
ALP SERPL-CCNC: 51 U/L (ref 35–104)
ALT SERPL-CCNC: 19 U/L (ref 0–32)
ANION GAP SERPL CALCULATED.3IONS-SCNC: 11 MMOL/L (ref 7–16)
AST SERPL-CCNC: 42 U/L (ref 0–31)
BASOPHILS # BLD: 0.02 K/UL (ref 0–0.2)
BASOPHILS NFR BLD: 0 % (ref 0–2)
BILIRUB SERPL-MCNC: 0.3 MG/DL (ref 0–1.2)
BUN SERPL-MCNC: 14 MG/DL (ref 6–23)
CALCIUM SERPL-MCNC: 10 MG/DL (ref 8.6–10.2)
CHLORIDE SERPL-SCNC: 101 MMOL/L (ref 98–107)
CO2 SERPL-SCNC: 27 MMOL/L (ref 22–29)
CREAT SERPL-MCNC: 1.3 MG/DL (ref 0.5–1)
EOSINOPHIL # BLD: 0.23 K/UL (ref 0.05–0.5)
EOSINOPHILS RELATIVE PERCENT: 3 % (ref 0–6)
ERYTHROCYTE [DISTWIDTH] IN BLOOD BY AUTOMATED COUNT: 13.4 % (ref 11.5–15)
GFR, ESTIMATED: 48 ML/MIN/1.73M2
GLUCOSE SERPL-MCNC: 87 MG/DL (ref 74–99)
HCT VFR BLD AUTO: 37.2 % (ref 34–48)
HGB BLD-MCNC: 12.7 G/DL (ref 11.5–15.5)
IMM GRANULOCYTES # BLD AUTO: <0.03 K/UL (ref 0–0.58)
IMM GRANULOCYTES NFR BLD: 0 % (ref 0–5)
INR PPP: 1.2
LYMPHOCYTES NFR BLD: 2.26 K/UL (ref 1.5–4)
LYMPHOCYTES RELATIVE PERCENT: 26 % (ref 20–42)
MCH RBC QN AUTO: 30 PG (ref 26–35)
MCHC RBC AUTO-ENTMCNC: 34.1 G/DL (ref 32–34.5)
MCV RBC AUTO: 87.9 FL (ref 80–99.9)
MONOCYTES NFR BLD: 0.87 K/UL (ref 0.1–0.95)
MONOCYTES NFR BLD: 10 % (ref 2–12)
NEUTROPHILS NFR BLD: 62 % (ref 43–80)
NEUTS SEG NFR BLD: 5.47 K/UL (ref 1.8–7.3)
PLATELET # BLD AUTO: 300 K/UL (ref 130–450)
PMV BLD AUTO: 9.5 FL (ref 7–12)
POTASSIUM SERPL-SCNC: 3.8 MMOL/L (ref 3.5–5)
PROT SERPL-MCNC: 6.9 G/DL (ref 6.4–8.3)
PROTHROMBIN TIME: 12.5 SEC (ref 9.3–12.4)
RBC # BLD AUTO: 4.23 M/UL (ref 3.5–5.5)
SODIUM SERPL-SCNC: 139 MMOL/L (ref 132–146)
TROPONIN I SERPL HS-MCNC: 13 NG/L (ref 0–14)
WBC OTHER # BLD: 8.9 K/UL (ref 4.5–11.5)

## 2025-04-23 PROCEDURE — 2500000003 HC RX 250 WO HCPCS: Performed by: PHYSICIAN ASSISTANT

## 2025-04-23 PROCEDURE — 85610 PROTHROMBIN TIME: CPT

## 2025-04-23 PROCEDURE — 85025 COMPLETE CBC W/AUTO DIFF WBC: CPT

## 2025-04-23 PROCEDURE — 80053 COMPREHEN METABOLIC PANEL: CPT

## 2025-04-23 PROCEDURE — 96372 THER/PROPH/DIAG INJ SC/IM: CPT

## 2025-04-23 PROCEDURE — 84484 ASSAY OF TROPONIN QUANT: CPT

## 2025-04-23 PROCEDURE — 74177 CT ABD & PELVIS W/CONTRAST: CPT

## 2025-04-23 PROCEDURE — 6370000000 HC RX 637 (ALT 250 FOR IP): Performed by: PHYSICIAN ASSISTANT

## 2025-04-23 PROCEDURE — 6360000004 HC RX CONTRAST MEDICATION: Performed by: RADIOLOGY

## 2025-04-23 PROCEDURE — 99285 EMERGENCY DEPT VISIT HI MDM: CPT

## 2025-04-23 PROCEDURE — 93005 ELECTROCARDIOGRAM TRACING: CPT | Performed by: PHYSICIAN ASSISTANT

## 2025-04-23 PROCEDURE — 71275 CT ANGIOGRAPHY CHEST: CPT

## 2025-04-23 RX ORDER — LIDOCAINE 50 MG/G
1 PATCH TOPICAL DAILY
Qty: 10 PATCH | Refills: 0 | Status: SHIPPED | OUTPATIENT
Start: 2025-04-23 | End: 2025-04-23

## 2025-04-23 RX ORDER — PREDNISONE 20 MG/1
60 TABLET ORAL DAILY
Qty: 15 TABLET | Refills: 0 | Status: SHIPPED | OUTPATIENT
Start: 2025-04-23 | End: 2025-04-28

## 2025-04-23 RX ORDER — ONDANSETRON 4 MG/1
4 TABLET, ORALLY DISINTEGRATING ORAL ONCE
Status: COMPLETED | OUTPATIENT
Start: 2025-04-23 | End: 2025-04-23

## 2025-04-23 RX ORDER — PREDNISONE 20 MG/1
60 TABLET ORAL DAILY
Qty: 15 TABLET | Refills: 0 | Status: SHIPPED | OUTPATIENT
Start: 2025-04-23 | End: 2025-04-23

## 2025-04-23 RX ORDER — LIDOCAINE 50 MG/G
1 PATCH TOPICAL DAILY
Qty: 10 PATCH | Refills: 0 | Status: SHIPPED | OUTPATIENT
Start: 2025-04-23 | End: 2025-05-03

## 2025-04-23 RX ORDER — MORPHINE SULFATE 2 MG/ML
2 INJECTION, SOLUTION INTRAMUSCULAR; INTRAVENOUS ONCE
Refills: 0 | Status: DISCONTINUED | OUTPATIENT
Start: 2025-04-23 | End: 2025-04-23

## 2025-04-23 RX ORDER — METHOCARBAMOL 500 MG/1
750 TABLET, FILM COATED ORAL ONCE
Status: COMPLETED | OUTPATIENT
Start: 2025-04-23 | End: 2025-04-23

## 2025-04-23 RX ORDER — HYDROCODONE BITARTRATE AND ACETAMINOPHEN 5; 325 MG/1; MG/1
1 TABLET ORAL ONCE
Status: COMPLETED | OUTPATIENT
Start: 2025-04-23 | End: 2025-04-23

## 2025-04-23 RX ORDER — IOPAMIDOL 755 MG/ML
75 INJECTION, SOLUTION INTRAVASCULAR
Status: COMPLETED | OUTPATIENT
Start: 2025-04-23 | End: 2025-04-23

## 2025-04-23 RX ORDER — MORPHINE SULFATE 2 MG/ML
2 INJECTION, SOLUTION INTRAMUSCULAR; INTRAVENOUS ONCE
Status: COMPLETED | OUTPATIENT
Start: 2025-04-23 | End: 2025-04-23

## 2025-04-23 RX ADMIN — ONDANSETRON 4 MG: 4 TABLET, ORALLY DISINTEGRATING ORAL at 19:32

## 2025-04-23 RX ADMIN — METHOCARBAMOL 750 MG: 500 TABLET ORAL at 19:31

## 2025-04-23 RX ADMIN — HYDROCODONE BITARTRATE AND ACETAMINOPHEN 1 TABLET: 5; 325 TABLET ORAL at 18:48

## 2025-04-23 RX ADMIN — IOPAMIDOL 75 ML: 755 INJECTION, SOLUTION INTRAVENOUS at 20:12

## 2025-04-23 RX ADMIN — MORPHINE SULFATE 2 MG: 2 INJECTION, SOLUTION INTRAMUSCULAR; INTRAVENOUS at 21:34

## 2025-04-23 ASSESSMENT — PAIN SCALES - GENERAL
PAINLEVEL_OUTOF10: 7
PAINLEVEL_OUTOF10: 8
PAINLEVEL_OUTOF10: 7

## 2025-04-23 ASSESSMENT — PAIN DESCRIPTION - DESCRIPTORS
DESCRIPTORS: ACHING;SHARP;SHOOTING
DESCRIPTORS: ACHING
DESCRIPTORS: ACHING;SHARP

## 2025-04-23 ASSESSMENT — PAIN DESCRIPTION - ORIENTATION
ORIENTATION: LEFT;UPPER;LOWER
ORIENTATION: MID;UPPER
ORIENTATION: LEFT;MID

## 2025-04-23 ASSESSMENT — PAIN DESCRIPTION - LOCATION
LOCATION: BACK

## 2025-04-23 ASSESSMENT — PAIN - FUNCTIONAL ASSESSMENT: PAIN_FUNCTIONAL_ASSESSMENT: PREVENTS OR INTERFERES WITH ALL ACTIVE AND SOME PASSIVE ACTIVITIES

## 2025-04-23 NOTE — ED PROVIDER NOTES
Independent [facility] Visit.        HPI:  4/23/25, Time: 5:40 PM EDT         Niru Chaves is a 64 y.o. female presenting to the ED for bilateral flank pain, beginning 2 days ago.  The complaint has been persistent, moderate in severity, and worsened by nothing.  History is provided by the patient the emergency department today.  States that the pain is located in the bilateral flank area and does not radiate.  Has had similar symptoms in the past when she was diagnosed with pulmonary emboli.  History of chronic DVTs.  He is on Coumadin.  Has not missed any doses.  Denies any chest pain.  Will occasionally get short of breath when she walks up and down the steps.  Denies any dysuria, urinary frequency, urinary urgency or hematuria.  No abdominal pain or lower back pain.  Afebrile without recent travel or sick contacts.  Patient denies all other symptoms at this time.        Review of Systems:   A complete review of systems was performed and pertinent positives and negatives are stated within HPI, all other systems reviewed and are negative.          --------------------------------------------- PAST HISTORY ---------------------------------------------  Past Medical History:  has a past medical history of CAD (coronary artery disease), CHF (congestive heart failure) ([facility]), Chronic pain, COPD (chronic obstructive pulmonary disease) ([facility]), DJD (degenerative joint disease), DVT (deep venous thrombosis) ([facility]), History of degenerative disc disease, HLD (hyperlipidemia), HTN (hypertension), and PE (pulmonary thromboembolism) ([facility]).    Past Surgical History:  has no past surgical history on file.    Social History:  reports that she has been smoking cigarettes. She has never used smokeless tobacco. She reports that she does not drink alcohol and does not use drugs.    Family History: family history is not on file.     The patient’s home medications have been reviewed.    Allergies: Metoclopramide, Tizanidine, Toradol [ketorolac

## 2025-04-24 LAB
EKG ATRIAL RATE: 79 BPM
EKG P AXIS: 73 DEGREES
EKG P-R INTERVAL: 144 MS
EKG Q-T INTERVAL: 380 MS
EKG QRS DURATION: 80 MS
EKG QTC CALCULATION (BAZETT): 435 MS
EKG R AXIS: 33 DEGREES
EKG T AXIS: 57 DEGREES
EKG VENTRICULAR RATE: 79 BPM

## 2025-04-24 PROCEDURE — 93010 ELECTROCARDIOGRAM REPORT: CPT | Performed by: INTERNAL MEDICINE

## 2025-06-04 ENCOUNTER — APPOINTMENT (OUTPATIENT)
Dept: GENERAL RADIOLOGY | Age: 65
DRG: 191 | End: 2025-06-04
Payer: COMMERCIAL

## 2025-06-04 ENCOUNTER — HOSPITAL ENCOUNTER (INPATIENT)
Age: 65
LOS: 1 days | Discharge: HOME | DRG: 191 | End: 2025-06-05
Attending: EMERGENCY MEDICINE | Admitting: FAMILY MEDICINE
Payer: COMMERCIAL

## 2025-06-04 DIAGNOSIS — J96.01 ACUTE RESPIRATORY FAILURE WITH HYPOXIA (HCC): Primary | ICD-10-CM

## 2025-06-04 PROBLEM — J44.1 COPD EXACERBATION (HCC): Status: ACTIVE | Noted: 2025-06-04

## 2025-06-04 LAB
ALBUMIN SERPL-MCNC: 3.8 G/DL (ref 3.5–5.2)
ALP SERPL-CCNC: 57 U/L (ref 35–104)
ALT SERPL-CCNC: 8 U/L (ref 0–32)
ANION GAP SERPL CALCULATED.3IONS-SCNC: 15 MMOL/L (ref 7–16)
AST SERPL-CCNC: 12 U/L (ref 0–31)
BASOPHILS # BLD: 0.01 K/UL (ref 0–0.2)
BASOPHILS NFR BLD: 0 % (ref 0–2)
BILIRUB SERPL-MCNC: 0.3 MG/DL (ref 0–1.2)
BNP SERPL-MCNC: 138 PG/ML (ref 0–450)
BUN SERPL-MCNC: 13 MG/DL (ref 6–23)
CALCIUM SERPL-MCNC: 9 MG/DL (ref 8.6–10.2)
CHLORIDE SERPL-SCNC: 105 MMOL/L (ref 98–107)
CO2 SERPL-SCNC: 22 MMOL/L (ref 22–29)
CREAT SERPL-MCNC: 1 MG/DL (ref 0.5–1)
EKG ATRIAL RATE: 79 BPM
EKG P AXIS: 63 DEGREES
EKG P-R INTERVAL: 140 MS
EKG Q-T INTERVAL: 382 MS
EKG QRS DURATION: 88 MS
EKG QTC CALCULATION (BAZETT): 438 MS
EKG R AXIS: 5 DEGREES
EKG T AXIS: 37 DEGREES
EKG VENTRICULAR RATE: 79 BPM
EOSINOPHIL # BLD: 0.18 K/UL (ref 0.05–0.5)
EOSINOPHILS RELATIVE PERCENT: 3 % (ref 0–6)
ERYTHROCYTE [DISTWIDTH] IN BLOOD BY AUTOMATED COUNT: 13.2 % (ref 11.5–15)
GFR, ESTIMATED: 64 ML/MIN/1.73M2
GLUCOSE SERPL-MCNC: 109 MG/DL (ref 74–99)
HCT VFR BLD AUTO: 33.8 % (ref 34–48)
HGB BLD-MCNC: 11.6 G/DL (ref 11.5–15.5)
IMM GRANULOCYTES # BLD AUTO: <0.03 K/UL (ref 0–0.58)
IMM GRANULOCYTES NFR BLD: 0 % (ref 0–5)
LYMPHOCYTES NFR BLD: 1.54 K/UL (ref 1.5–4)
LYMPHOCYTES RELATIVE PERCENT: 21 % (ref 20–42)
MCH RBC QN AUTO: 30.1 PG (ref 26–35)
MCHC RBC AUTO-ENTMCNC: 34.3 G/DL (ref 32–34.5)
MCV RBC AUTO: 87.8 FL (ref 80–99.9)
MONOCYTES NFR BLD: 0.7 K/UL (ref 0.1–0.95)
MONOCYTES NFR BLD: 10 % (ref 2–12)
NEUTROPHILS NFR BLD: 66 % (ref 43–80)
NEUTS SEG NFR BLD: 4.77 K/UL (ref 1.8–7.3)
PLATELET # BLD AUTO: 222 K/UL (ref 130–450)
PMV BLD AUTO: 9 FL (ref 7–12)
POTASSIUM SERPL-SCNC: 3.4 MMOL/L (ref 3.5–5)
PROT SERPL-MCNC: 6.6 G/DL (ref 6.4–8.3)
RBC # BLD AUTO: 3.85 M/UL (ref 3.5–5.5)
SODIUM SERPL-SCNC: 142 MMOL/L (ref 132–146)
TROPONIN I SERPL HS-MCNC: 12 NG/L (ref 0–14)
WBC OTHER # BLD: 7.2 K/UL (ref 4.5–11.5)

## 2025-06-04 PROCEDURE — 80053 COMPREHEN METABOLIC PANEL: CPT

## 2025-06-04 PROCEDURE — 93005 ELECTROCARDIOGRAM TRACING: CPT | Performed by: EMERGENCY MEDICINE

## 2025-06-04 PROCEDURE — 99223 1ST HOSP IP/OBS HIGH 75: CPT | Performed by: FAMILY MEDICINE

## 2025-06-04 PROCEDURE — 85025 COMPLETE CBC W/AUTO DIFF WBC: CPT

## 2025-06-04 PROCEDURE — 71046 X-RAY EXAM CHEST 2 VIEWS: CPT

## 2025-06-04 PROCEDURE — 99285 EMERGENCY DEPT VISIT HI MDM: CPT

## 2025-06-04 PROCEDURE — 93010 ELECTROCARDIOGRAM REPORT: CPT | Performed by: INTERNAL MEDICINE

## 2025-06-04 PROCEDURE — 1200000000 HC SEMI PRIVATE

## 2025-06-04 PROCEDURE — 5A0935A ASSISTANCE WITH RESPIRATORY VENTILATION, LESS THAN 24 CONSECUTIVE HOURS, HIGH NASAL FLOW/VELOCITY: ICD-10-PCS | Performed by: INTERNAL MEDICINE

## 2025-06-04 PROCEDURE — 84484 ASSAY OF TROPONIN QUANT: CPT

## 2025-06-04 PROCEDURE — 83880 ASSAY OF NATRIURETIC PEPTIDE: CPT

## 2025-06-04 RX ORDER — IBUPROFEN 400 MG/1
400 TABLET, FILM COATED ORAL EVERY 4 HOURS PRN
Status: DISCONTINUED | OUTPATIENT
Start: 2025-06-04 | End: 2025-06-05 | Stop reason: HOSPADM

## 2025-06-04 RX ORDER — NITROGLYCERIN 0.4 MG/1
0.4 TABLET SUBLINGUAL EVERY 5 MIN PRN
Status: DISCONTINUED | OUTPATIENT
Start: 2025-06-04 | End: 2025-06-05 | Stop reason: HOSPADM

## 2025-06-04 RX ORDER — SODIUM CHLORIDE 0.9 % (FLUSH) 0.9 %
5-40 SYRINGE (ML) INJECTION EVERY 12 HOURS SCHEDULED
Status: DISCONTINUED | OUTPATIENT
Start: 2025-06-04 | End: 2025-06-05 | Stop reason: HOSPADM

## 2025-06-04 RX ORDER — IPRATROPIUM BROMIDE AND ALBUTEROL SULFATE 2.5; .5 MG/3ML; MG/3ML
1 SOLUTION RESPIRATORY (INHALATION)
Status: DISCONTINUED | OUTPATIENT
Start: 2025-06-05 | End: 2025-06-05 | Stop reason: HOSPADM

## 2025-06-04 RX ORDER — ALPRAZOLAM 0.5 MG
0.5 TABLET ORAL 2 TIMES DAILY PRN
Status: DISCONTINUED | OUTPATIENT
Start: 2025-06-04 | End: 2025-06-05 | Stop reason: HOSPADM

## 2025-06-04 RX ORDER — LISINOPRIL 20 MG/1
40 TABLET ORAL DAILY
Status: DISCONTINUED | OUTPATIENT
Start: 2025-06-05 | End: 2025-06-05 | Stop reason: HOSPADM

## 2025-06-04 RX ORDER — GUAIFENESIN 600 MG/1
600 TABLET, EXTENDED RELEASE ORAL 2 TIMES DAILY
Status: DISCONTINUED | OUTPATIENT
Start: 2025-06-04 | End: 2025-06-05 | Stop reason: HOSPADM

## 2025-06-04 RX ORDER — ACETAMINOPHEN 650 MG/1
650 SUPPOSITORY RECTAL EVERY 6 HOURS PRN
Status: DISCONTINUED | OUTPATIENT
Start: 2025-06-04 | End: 2025-06-05 | Stop reason: HOSPADM

## 2025-06-04 RX ORDER — QUETIAPINE FUMARATE 100 MG/1
300 TABLET, FILM COATED ORAL DAILY
Status: DISCONTINUED | OUTPATIENT
Start: 2025-06-05 | End: 2025-06-05 | Stop reason: HOSPADM

## 2025-06-04 RX ORDER — ENOXAPARIN SODIUM 100 MG/ML
40 INJECTION SUBCUTANEOUS DAILY
Status: DISCONTINUED | OUTPATIENT
Start: 2025-06-04 | End: 2025-06-05

## 2025-06-04 RX ORDER — PREDNISONE 20 MG/1
40 TABLET ORAL DAILY
Status: DISCONTINUED | OUTPATIENT
Start: 2025-06-05 | End: 2025-06-05 | Stop reason: HOSPADM

## 2025-06-04 RX ORDER — SODIUM CHLORIDE 0.9 % (FLUSH) 0.9 %
5-40 SYRINGE (ML) INJECTION PRN
Status: DISCONTINUED | OUTPATIENT
Start: 2025-06-04 | End: 2025-06-05 | Stop reason: HOSPADM

## 2025-06-04 RX ORDER — AZITHROMYCIN 250 MG/1
500 TABLET, FILM COATED ORAL DAILY
Status: DISCONTINUED | OUTPATIENT
Start: 2025-06-04 | End: 2025-06-05 | Stop reason: HOSPADM

## 2025-06-04 RX ORDER — NICOTINE 21 MG/24HR
1 PATCH, TRANSDERMAL 24 HOURS TRANSDERMAL DAILY
Status: DISCONTINUED | OUTPATIENT
Start: 2025-06-05 | End: 2025-06-05 | Stop reason: HOSPADM

## 2025-06-04 RX ORDER — PANTOPRAZOLE SODIUM 40 MG/1
40 TABLET, DELAYED RELEASE ORAL
Status: DISCONTINUED | OUTPATIENT
Start: 2025-06-05 | End: 2025-06-05 | Stop reason: HOSPADM

## 2025-06-04 RX ORDER — ISOSORBIDE MONONITRATE 60 MG/1
60 TABLET, EXTENDED RELEASE ORAL DAILY
Status: DISCONTINUED | OUTPATIENT
Start: 2025-06-05 | End: 2025-06-05 | Stop reason: HOSPADM

## 2025-06-04 RX ORDER — ASPIRIN 81 MG/1
324 TABLET, CHEWABLE ORAL ONCE
Status: DISCONTINUED | OUTPATIENT
Start: 2025-06-04 | End: 2025-06-05 | Stop reason: HOSPADM

## 2025-06-04 RX ORDER — MORPHINE SULFATE 15 MG/1
30 TABLET, FILM COATED, EXTENDED RELEASE ORAL EVERY 12 HOURS
Refills: 0 | Status: DISCONTINUED | OUTPATIENT
Start: 2025-06-04 | End: 2025-06-05 | Stop reason: HOSPADM

## 2025-06-04 RX ORDER — MORPHINE SULFATE 15 MG/1
15 TABLET, FILM COATED, EXTENDED RELEASE ORAL EVERY 12 HOURS
Refills: 0 | Status: DISCONTINUED | OUTPATIENT
Start: 2025-06-04 | End: 2025-06-04

## 2025-06-04 RX ORDER — ACETAMINOPHEN 325 MG/1
650 TABLET ORAL EVERY 6 HOURS PRN
Status: DISCONTINUED | OUTPATIENT
Start: 2025-06-04 | End: 2025-06-05 | Stop reason: HOSPADM

## 2025-06-04 RX ORDER — FOLIC ACID 1 MG/1
1 TABLET ORAL DAILY
Status: DISCONTINUED | OUTPATIENT
Start: 2025-06-05 | End: 2025-06-05 | Stop reason: HOSPADM

## 2025-06-04 RX ORDER — ATORVASTATIN CALCIUM 40 MG/1
40 TABLET, FILM COATED ORAL NIGHTLY
Status: DISCONTINUED | OUTPATIENT
Start: 2025-06-05 | End: 2025-06-05 | Stop reason: HOSPADM

## 2025-06-04 RX ORDER — METOPROLOL SUCCINATE 25 MG/1
25 TABLET, EXTENDED RELEASE ORAL DAILY
Status: DISCONTINUED | OUTPATIENT
Start: 2025-06-05 | End: 2025-06-05 | Stop reason: HOSPADM

## 2025-06-04 ASSESSMENT — ENCOUNTER SYMPTOMS
CHEST TIGHTNESS: 1
ABDOMINAL PAIN: 0
SHORTNESS OF BREATH: 1

## 2025-06-04 NOTE — ED PROVIDER NOTES
and shortness of breath.  Does not normally use oxygen but is now requiring oxygen.  He does have a history of tobacco abuse    Considered STEMI, NSTEMI, ACS, pleural effusion, pneumonia.  Found to have hypoxic respiratory failure.  No evidence of STEMI or NSTEMI at this point.  Required multiple doses of DuoNebs, IV steroids ultimately admission to the hospital    Social Determinants affecting Dx or Tx: Stress, tobacco abuse    Chronic Conditions: CAD, CHF, chronic pain, COPD    Records Reviewed: History of DVT and admission to the hospital of 2324 by internal medicine team      ED Course as of 06/04/25 2233 Wed Jun 04, 2025 1954 Patient ambulating, down to 86% on room air.  Initially hypotensive but had some improvement with some fluids [SO]      ED Course User Index  [SO] Dash Copeland DO      .  ED Course as of 06/04/25 2233 Wed Jun 04, 2025 1954 Patient ambulating, down to 86% on room air.  Initially hypotensive but had some improvement with some fluids [SO]      ED Course User Index  [SO] Dash Copeland DO       --------------------------------------------- PAST HISTORY ---------------------------------------------  Past Medical History:  has a past medical history of CAD (coronary artery disease), CHF (congestive heart failure) ([facility]), Chronic pain, COPD (chronic obstructive pulmonary disease) ([facility]), DJD (degenerative joint disease), DVT (deep venous thrombosis) ([facility]), History of degenerative disc disease, HLD (hyperlipidemia), HTN (hypertension), and PE (pulmonary thromboembolism) ([facility]).    Past Surgical History:  has no past surgical history on file.    Social History:  reports that she has been smoking cigarettes. She has never used smokeless tobacco. She reports that she does not drink alcohol and does not use drugs.    Family History: family history is not on file.     The patient’s home medications have been reviewed.    Allergies: Metoclopramide, Tizanidine, Toradol [ketorolac tromethamine],

## 2025-06-05 VITALS
RESPIRATION RATE: 18 BRPM | SYSTOLIC BLOOD PRESSURE: 144 MMHG | BODY MASS INDEX: 22.35 KG/M2 | WEIGHT: 147.49 LBS | HEART RATE: 102 BPM | DIASTOLIC BLOOD PRESSURE: 76 MMHG | HEIGHT: 68 IN | TEMPERATURE: 98.4 F | OXYGEN SATURATION: 95 %

## 2025-06-05 LAB
B PARAP IS1001 DNA NPH QL NAA+NON-PROBE: NOT DETECTED
B PERT DNA SPEC QL NAA+PROBE: NOT DETECTED
C PNEUM DNA NPH QL NAA+NON-PROBE: NOT DETECTED
FLUAV RNA NPH QL NAA+NON-PROBE: NOT DETECTED
FLUBV RNA NPH QL NAA+NON-PROBE: NOT DETECTED
HADV DNA NPH QL NAA+NON-PROBE: NOT DETECTED
HCOV 229E RNA NPH QL NAA+NON-PROBE: NOT DETECTED
HCOV HKU1 RNA NPH QL NAA+NON-PROBE: NOT DETECTED
HCOV NL63 RNA NPH QL NAA+NON-PROBE: NOT DETECTED
HCOV OC43 RNA NPH QL NAA+NON-PROBE: NOT DETECTED
HMPV RNA NPH QL NAA+NON-PROBE: NOT DETECTED
HPIV1 RNA NPH QL NAA+NON-PROBE: NOT DETECTED
HPIV2 RNA NPH QL NAA+NON-PROBE: NOT DETECTED
HPIV3 RNA NPH QL NAA+NON-PROBE: NOT DETECTED
HPIV4 RNA NPH QL NAA+NON-PROBE: NOT DETECTED
INR PPP: 1.2
INR PPP: 1.2
M PNEUMO DNA NPH QL NAA+NON-PROBE: NOT DETECTED
PROTHROMBIN TIME: 13.1 SEC (ref 9.3–12.4)
PROTHROMBIN TIME: 13.1 SEC (ref 9.3–12.4)
RSV RNA NPH QL NAA+NON-PROBE: NOT DETECTED
RV+EV RNA NPH QL NAA+NON-PROBE: DETECTED
SARS-COV-2 RNA NPH QL NAA+NON-PROBE: NOT DETECTED
SPECIMEN DESCRIPTION: ABNORMAL

## 2025-06-05 PROCEDURE — 6370000000 HC RX 637 (ALT 250 FOR IP): Performed by: FAMILY MEDICINE

## 2025-06-05 PROCEDURE — 85610 PROTHROMBIN TIME: CPT

## 2025-06-05 PROCEDURE — 2500000003 HC RX 250 WO HCPCS: Performed by: FAMILY MEDICINE

## 2025-06-05 PROCEDURE — 6360000002 HC RX W HCPCS: Performed by: FAMILY MEDICINE

## 2025-06-05 PROCEDURE — 94640 AIRWAY INHALATION TREATMENT: CPT

## 2025-06-05 PROCEDURE — 36415 COLL VENOUS BLD VENIPUNCTURE: CPT

## 2025-06-05 PROCEDURE — 99239 HOSP IP/OBS DSCHRG MGMT >30: CPT | Performed by: INTERNAL MEDICINE

## 2025-06-05 PROCEDURE — 0202U NFCT DS 22 TRGT SARS-COV-2: CPT

## 2025-06-05 RX ORDER — WARFARIN SODIUM 7.5 MG/1
7.5 TABLET ORAL
Status: DISCONTINUED | OUTPATIENT
Start: 2025-06-05 | End: 2025-06-05 | Stop reason: HOSPADM

## 2025-06-05 RX ORDER — ENOXAPARIN SODIUM 100 MG/ML
1 INJECTION SUBCUTANEOUS 2 TIMES DAILY
Status: DISCONTINUED | OUTPATIENT
Start: 2025-06-05 | End: 2025-06-05 | Stop reason: HOSPADM

## 2025-06-05 RX ORDER — GUAIFENESIN 600 MG/1
600 TABLET, EXTENDED RELEASE ORAL 2 TIMES DAILY
Qty: 28 TABLET | Refills: 0 | Status: SHIPPED | OUTPATIENT
Start: 2025-06-05 | End: 2025-06-19

## 2025-06-05 RX ORDER — AZITHROMYCIN 500 MG/1
500 TABLET, FILM COATED ORAL DAILY
Qty: 1 TABLET | Refills: 0 | Status: SHIPPED | OUTPATIENT
Start: 2025-06-06 | End: 2025-06-07

## 2025-06-05 RX ORDER — PREDNISONE 10 MG/1
TABLET ORAL
Qty: 20 TABLET | Refills: 0 | Status: SHIPPED | OUTPATIENT
Start: 2025-06-05 | End: 2025-06-13

## 2025-06-05 RX ORDER — WARFARIN SODIUM 7.5 MG/1
7.5 TABLET ORAL ONCE
Status: COMPLETED | OUTPATIENT
Start: 2025-06-05 | End: 2025-06-05

## 2025-06-05 RX ADMIN — GUAIFENESIN 600 MG: 600 TABLET, EXTENDED RELEASE ORAL at 10:24

## 2025-06-05 RX ADMIN — IPRATROPIUM BROMIDE AND ALBUTEROL SULFATE 1 DOSE: .5; 2.5 SOLUTION RESPIRATORY (INHALATION) at 09:43

## 2025-06-05 RX ADMIN — ALPRAZOLAM 0.5 MG: 0.5 TABLET ORAL at 12:45

## 2025-06-05 RX ADMIN — METHYLPREDNISOLONE SODIUM SUCCINATE 40 MG: 40 INJECTION, POWDER, LYOPHILIZED, FOR SOLUTION INTRAMUSCULAR; INTRAVENOUS at 01:46

## 2025-06-05 RX ADMIN — SODIUM CHLORIDE, PRESERVATIVE FREE 10 ML: 5 INJECTION INTRAVENOUS at 10:25

## 2025-06-05 RX ADMIN — CEFTRIAXONE SODIUM 1000 MG: 1 INJECTION, POWDER, FOR SOLUTION INTRAMUSCULAR; INTRAVENOUS at 00:41

## 2025-06-05 RX ADMIN — IPRATROPIUM BROMIDE AND ALBUTEROL SULFATE 1 DOSE: .5; 2.5 SOLUTION RESPIRATORY (INHALATION) at 00:06

## 2025-06-05 RX ADMIN — AZITHROMYCIN DIHYDRATE 500 MG: 250 TABLET ORAL at 00:36

## 2025-06-05 RX ADMIN — AZITHROMYCIN DIHYDRATE 500 MG: 250 TABLET ORAL at 10:24

## 2025-06-05 RX ADMIN — PANTOPRAZOLE SODIUM 40 MG: 40 TABLET, DELAYED RELEASE ORAL at 05:09

## 2025-06-05 RX ADMIN — MORPHINE SULFATE 30 MG: 15 TABLET, FILM COATED, EXTENDED RELEASE ORAL at 00:36

## 2025-06-05 RX ADMIN — SODIUM CHLORIDE, PRESERVATIVE FREE 10 ML: 5 INJECTION INTRAVENOUS at 00:46

## 2025-06-05 RX ADMIN — ISOSORBIDE MONONITRATE 60 MG: 60 TABLET, EXTENDED RELEASE ORAL at 10:24

## 2025-06-05 RX ADMIN — WARFARIN SODIUM 7.5 MG: 7.5 TABLET ORAL at 02:15

## 2025-06-05 RX ADMIN — ENOXAPARIN SODIUM 40 MG: 100 INJECTION SUBCUTANEOUS at 00:39

## 2025-06-05 RX ADMIN — ALPRAZOLAM 0.5 MG: 0.5 TABLET ORAL at 01:39

## 2025-06-05 RX ADMIN — FOLIC ACID 1 MG: 1 TABLET ORAL at 10:26

## 2025-06-05 RX ADMIN — LISINOPRIL 40 MG: 20 TABLET ORAL at 10:24

## 2025-06-05 RX ADMIN — MORPHINE SULFATE 30 MG: 15 TABLET, FILM COATED, EXTENDED RELEASE ORAL at 10:24

## 2025-06-05 RX ADMIN — METOPROLOL SUCCINATE 25 MG: 25 TABLET, EXTENDED RELEASE ORAL at 10:25

## 2025-06-05 RX ADMIN — PREDNISONE 40 MG: 20 TABLET ORAL at 10:24

## 2025-06-05 RX ADMIN — GUAIFENESIN 600 MG: 600 TABLET, EXTENDED RELEASE ORAL at 00:36

## 2025-06-05 RX ADMIN — IPRATROPIUM BROMIDE AND ALBUTEROL SULFATE 1 DOSE: .5; 2.5 SOLUTION RESPIRATORY (INHALATION) at 15:00

## 2025-06-05 RX ADMIN — IPRATROPIUM BROMIDE AND ALBUTEROL SULFATE 1 DOSE: .5; 2.5 SOLUTION RESPIRATORY (INHALATION) at 05:57

## 2025-06-05 ASSESSMENT — PAIN DESCRIPTION - LOCATION
LOCATION: FLANK
LOCATION: BACK

## 2025-06-05 ASSESSMENT — PAIN SCALES - GENERAL
PAINLEVEL_OUTOF10: 8
PAINLEVEL_OUTOF10: 6

## 2025-06-05 ASSESSMENT — PAIN DESCRIPTION - DESCRIPTORS: DESCRIPTORS: STABBING

## 2025-06-05 ASSESSMENT — PAIN DESCRIPTION - ORIENTATION: ORIENTATION: RIGHT

## 2025-06-05 NOTE — CARE COORDINATION
Case Management Assessment  Initial Evaluation    Date/Time of Evaluation: 6/5/2025 11:23 AM  Assessment Completed by: CRYSTAL Murdock    If patient is discharged prior to next notation, then this note serves as note for discharge by case management.    Patient Name: Niru Chaves                   YOB: 1960  Diagnosis: COPD exacerbation (HCC) [J44.1]  Acute respiratory failure with hypoxia (HCC) [J96.01]                   Date / Time: 6/4/2025  4:03 PM    Patient Admission Status: Inpatient   Readmission Risk (Low < 19, Mod (19-27), High > 27): Readmission Risk Score: 12.5    Current PCP: Crystal Paulino MD  PCP verified by CM? Yes    Chart Reviewed: Yes      History Provided by: Patient  Patient Orientation: Alert and Oriented    Patient Cognition: Alert    Hospitalization in the last 30 days (Readmission):  No    If yes, Readmission Assessment in  Navigator will be completed.    Advance Directives:      Code Status: Full Code   Patient's Primary Decision Maker is: Legal Next of Kin    Primary Decision Maker: Abdiaziz Arcos - Brother/Sister - [phone]    Discharge Planning:    Patient lives with: Spouse/Significant Other, Children (ex spouse) Type of Home: House  Primary Care Giver: Self  Patient Support Systems include: Children   Current Financial resources: Medicare, Medicaid  Current community resources: None  Current services prior to admission: [employer] Wheels (refers to service as \"[employer]\" meals)            Current DME:              Type of Home Care services:  None    ADLS  Prior functional level: Independent in ADLs/IADLs  Current functional level: Independent in ADLs/IADLs    PT AM-PAC:   /24  OT AM-PAC:   /24    Family can provide assistance at DC: No  Would you like Case Management to discuss the discharge plan with any other family members/significant others, and if so, who? No  Plans to Return to Present Housing: Yes    Potential Assistance needed at discharge: N/A

## 2025-06-05 NOTE — CARE COORDINATION
Patient is discharged, needing ride home.  Called [facility] for transport via her insurance and a taxi home however because her home address is [place] and she is going to [place] address they will not schedule it.  Have provided a Taxi Voucher to RN for DC once patient receives meds from pharmacy.

## 2025-06-05 NOTE — FLOWSHEET NOTE
4 Eyes Skin Assessment     NAME:  Niru Chaves  YOB: 1960  MEDICAL RECORD NUMBER:  [id number]    The patient is being assessed for  Admission    I agree that at least one RN has performed a thorough Head to Toe Skin Assessment on the patient. ALL assessment sites listed below have been assessed.      Areas assessed by both nurses:    Head, Face, Ears, Shoulders, Back, Chest, Arms, Elbows, Hands, Sacrum. Buttock, Coccyx, Ischium, Legs. Feet and Heels, and Under Medical Devices         Does the Patient have a Wound? No noted wound(s)       Adi Prevention initiated by RN: No  Wound Care Orders initiated by RN: No    Pressure Injury (Stage 3,4, Unstageable, DTI, NWPT, and Complex wounds) if present, place Wound referral order by RN under : No    New Ostomies, if present place, Ostomy referral order under : No     Nurse 1 eSignature: Electronically signed by Geovanna Simpson RN on 6/5/25 at 2:20 AM EDT    **SHARE this note so that the co-signing nurse can place an eSignature**    Nurse 2 eSignature: {Esignature:[id number]}

## 2025-06-05 NOTE — PROGRESS NOTES
Low Risk Nutrition Note     Reason for Visit:   Length of Stay    Nutrition Assessment:  Pt admitted for COPD exacerbation. PMH of CHF, DVT, CAD w/ stent. Pt reports good appetite - reports she ate 100% of breakfast but <50% of lunch. States she only eats 2 meals/day at home. Reports no recent wt changes - mild wt loss noted per EMR wt hx (hx CHF). Continue diet as ordered. No nutritional interventions recommended at this time.     Current Nutrition Therapies:    ADULT DIET; Regular    Anthropometrics:   Current Height: 172.7 cm (5' 8\")  Current Weight - Scale: 66.9 kg (147 lb 7.8 oz)      Monitoring and Evaluation:  No nutrition diagnosis. Patient will be monitored per nutrition standards of care.     Consult Dietitian if nutrition intervention essential to patient care is needed.     Discharge Planning:  No needs    Valery Rhoades RDN, LDN

## 2025-06-05 NOTE — CARE COORDINATION
**ATTENTION BEDSIDE RN**    Please copy below template, fill in appropriate fields, and place in a progress note.    Testing MUST be completed within but no later than 48 hours of discharge.     Please ambulate patient for a MINIMUM of 6 minutes to ensure accuracy of test.      Pulse ox was _______ % on room air at rest.  Ambulated patient on room air.    Oxygen saturation was _______ % on room air while ambulating. (lowest saturation reached)  Oxygen applied.    Recovery pulse ox was _______% on ____ liters of oxygen while ambulating.

## 2025-06-05 NOTE — PROGRESS NOTES
CLINICAL PHARMACY NOTE: MEDS TO BEDS    Total # of Prescriptions Filled: 3   The following medications were delivered to the patient:  Azithromycin 500 mg  Guaifenesin er 600 mg  Prednisone 10 mg    Additional Documentation:

## 2025-06-05 NOTE — DISCHARGE INSTRUCTIONS
Your information:  Name: Niru Chaves  : 1960    Your instructions:    YOU ARE BEING DISCHARGED HOME. PLEASE MAKE AND KEEP YOUR FOLLOW UP APPOINTMENTS.    What to do after you leave the hospital:    Recommended diet: regular diet    Recommended activity: activity as tolerated    IF YOU EXPERIENCE ANY OF THE FOLLOWING SYMPTOMS, CHEST PAIN, SHORTNESS OF BREATH, COUGHING UP BLOOD OR BLOODY SPUTUM, STOMACH PAIN OR CRAMPING, DARK, TARRY STOOLS, LOSS OF APPETITE, GENERAL NOT FEELING WELL, SIGNS AND SYMPTOMS OF INFECTION LIKE FEVER AND OR CHILLS, PLEASE CALL Crystal Gutierrez MD OR RETURN TO THE EMERGENCY ROOM.    The following personal items were collected during your admission and were returned to you:    Belongings  Dental Appliances: At home, Uppers, Lowers  Vision - Corrective Lenses: Eyeglasses, At home  Hearing Aid: None  Clothing: Socks, Shirt, Pants, Undergarments, Footwear  Jewelry: None  Body Piercings Removed: N/A  Electronic Devices: Cell Phone,   Weapons (Notify Protective Services/Security): None  Home Medications: None  Valuables Given To: Patient  Provide Name(s) of Who Valuable(s) Were Given To: n/a    Information obtained by:  By signing below, I understand that if any problems occur once I leave the hospital I am to contact  Crystal aPulino MD or go to emergency room.  I understand and acknowledge receipt of the instructions indicated above.

## 2025-06-05 NOTE — H&P
[facility]   History and Physical      CHIEF COMPLAINT:  SOB, URI symptoms    History of Present Illness:  64 y.o. 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  She reports her ex- whom she cares for is ill as well and gave it to her.  She has felt ill the past 2 days and today felt like she couldn't breathe.  She also reports chest pain from coughing.  Workup in ED significant for K 3.4.  CXR no acute finding.  EKG sinus rhythm.  Solu-medrol, ASA ordered in ED.  SaO2 ambulating in ED was 86%.    Informant(s) for H&P: patient, chart    REVIEW OF SYSTEMS:  no fevers, chills, n/v, ha, vision/hearing changes, wt changes, hot/cold flashes, other open skin lesions, diarrhea, constipation, dysuria/hematuria unless noted in HPI. Complete ROS performed with the patient and is otherwise negative.      PMH:  Past Medical History:   Diagnosis Date    CAD (coronary artery disease)     CHF (congestive heart failure) ([facility])     Chronic pain     COPD (chronic obstructive pulmonary disease) ([facility])     DJD (degenerative joint disease)     DVT (deep venous thrombosis) ([facility])     History of degenerative disc disease     HLD (hyperlipidemia)     HTN (hypertension)     PE (pulmonary thromboembolism) ([facility])        Surgical History:  History reviewed. No pertinent surgical history.    Medications Prior to Admission:    Prior to Admission medications    Medication Sig Start Date End Date Taking? Authorizing Provider   pantoprazole (PROTONIX) 40 MG tablet Take 1 tablet by mouth every morning (before breakfast) 2/4/25   Brendon Stanton MD   warfarin (COUMADIN) 7.5 MG tablet Take 1 tablet by mouth at bedtime 11/27/24   Tamara Medina DO   albuterol sulfate HFA (PROVENTIL;VENTOLIN;PROAIR) 108 (90 Base) MCG/ACT inhaler Inhale 2 puffs into the lungs every 6 hours as needed 3/18/24   Provider,

## 2025-06-05 NOTE — PROGRESS NOTES
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     Weight:   Wt Readings from Last 1 Encounters:   06/05/25 66.7 kg (147 lb)       TSH:  No results found for: \"TSH\"    Hepatic Function Panel:                            Lab Results   Component Value Date/Time    ALKPHOS 57 06/04/2025 04:33 PM    ALT 8 06/04/2025 04:33 PM    AST 12 06/04/2025 04:33 PM    BILITOT 0.3 06/04/2025 04:33 PM       Current significant warfarin drug-drug interactions include: macrolides (incr, INR)    Recent Labs     06/04/25  1818   HGB 11.6        Date Warfarin Dose INR Heparin or LMWH Comment   6/5 7.5 mg (0215) 1.2 Lovenox 40 mg daily -- increased to 1 mg/kg BID                                  Assessment:  Patient is a 64 y.o. female on warfarin for History of  VTE/PE.  Patient's home warfarin dosing regimen is 7.5 mg daily.   Goal INR 2 - 3  INR 1.2 today    Plan:  Warfarin 7.5 mg tonight  Daily PT/INR until the INR is stable within the therapeutic range  Pharmacist will follow and monitor/adjust dosing as necessary    Thank you for this consult,    Agata Bone, PharmD, BCPS 6/5/2025 12:48 PM   Ext: [phone]    W: [phone]

## 2025-06-05 NOTE — CONSULTS
Met with patient and discussed that their physician has ordered a referral to our outpatient Phase II Pulmonary Rehabilitation program. Reviewed the benefits of Pulmonary rehabilitation based on their diagnosis and personal risk factors. Patient demonstrates no interest in Pulmonary Rehabilitation at this time.  Pulmonary Rehabilitation brochure provided to patient/family. The Pulmonary Rehabilitation Program has been provided the patient's referral information and pertinent patient details and history. The patient may call [facility] at [phone] for additional information or questions. Contact information for [facility] Rehabilitation and other choices close to the patient's residence have been provided in the discharge instructions so that the patient may call and schedule an appointment when cleared by their physician. Thank you for the referral.

## 2025-06-05 NOTE — DISCHARGE SUMMARY
[facility] Hospitalist       Hospitalist Physician Discharge Summary       Crystal Paulino MD  [address]  [address]  [place] [id number]  [phone]    Call today        No future appointments.    Activity level: As tolerated    Diet: ADULT DIET; Regular    Labs: Per primary care.  Recommend INR next week    Condition at discharge: Stable/good    Dispo: Home    Patient ID:  Niru Chaves  [id number]  64 y.o.  1960    Admit date: 6/4/2025    Discharge date and time:  6/5/2025  1:38 PM    Admission Diagnoses: Principal Problem:    COPD exacerbation (HCC)  Active Problems:    Chronic obstructive pulmonary disease (HCC)    Hypertension    CHF (congestive heart failure) (HCC)    Hyperlipidemia    Chronic pain    Anxiety    Shortness of breath    Smoker  Resolved Problems:    * No resolved hospital problems. *      Discharge Diagnoses: Principal Problem:    COPD exacerbation (HCC)  Active Problems:    Chronic obstructive pulmonary disease (HCC)    Hypertension    CHF (congestive heart failure) (HCC)    Hyperlipidemia    Chronic pain    Anxiety    Shortness of breath    Smoker  Resolved Problems:    * No resolved hospital problems. *      Consults:  PULMONARY REHAB EVALUATION  IP CONSULT TO PHARMACY    Procedures: None    Hospital Course: This is a 64-year-old female that was admitted to the hospital with acute exacerbation of COPD due to rhinovirus infection.  Treated with steroids, scheduled breathing treatments, azithromycin given increased sputum production.  Quickly improved and eager for discharge to home 6/5.  Oxygen saturation with ambulation maintained 93% or greater for 6 minutes.  Stable for discharge home with close outpatient follow-up with primary care.  Also to note, patient had subtherapeutic INR and has had compliance issues with warfarin in the past.  Encouraged her to follow-up with her primary care physician as soon as possible to adjust warfarin doses.    Discharge